# Patient Record
Sex: MALE | Race: WHITE | NOT HISPANIC OR LATINO | Employment: PART TIME | URBAN - METROPOLITAN AREA
[De-identification: names, ages, dates, MRNs, and addresses within clinical notes are randomized per-mention and may not be internally consistent; named-entity substitution may affect disease eponyms.]

---

## 2017-01-11 ENCOUNTER — ALLSCRIPTS OFFICE VISIT (OUTPATIENT)
Dept: OTHER | Facility: OTHER | Age: 67
End: 2017-01-11

## 2017-01-11 DIAGNOSIS — E11.9 TYPE 2 DIABETES MELLITUS WITHOUT COMPLICATIONS (HCC): ICD-10-CM

## 2017-01-11 DIAGNOSIS — R07.89 OTHER CHEST PAIN: ICD-10-CM

## 2017-01-16 ENCOUNTER — TRANSCRIBE ORDERS (OUTPATIENT)
Dept: ADMINISTRATIVE | Facility: HOSPITAL | Age: 67
End: 2017-01-16

## 2017-01-16 ENCOUNTER — HOSPITAL ENCOUNTER (OUTPATIENT)
Dept: NON INVASIVE DIAGNOSTICS | Facility: HOSPITAL | Age: 67
Discharge: HOME | End: 2017-01-16
Attending: FAMILY MEDICINE
Payer: COMMERCIAL

## 2017-01-16 ENCOUNTER — GENERIC CONVERSION - ENCOUNTER (OUTPATIENT)
Dept: OTHER | Facility: OTHER | Age: 67
End: 2017-01-16

## 2017-01-16 ENCOUNTER — HOSPITAL ENCOUNTER (OUTPATIENT)
Dept: RADIOLOGY | Facility: HOSPITAL | Age: 67
Discharge: HOME | End: 2017-01-16
Attending: FAMILY MEDICINE
Payer: COMMERCIAL

## 2017-01-16 DIAGNOSIS — E11.49 OTHER DIABETIC NEUROLOGICAL COMPLICATION ASSOCIATED WITH TYPE 2 DIABETES MELLITUS (HCC): Primary | ICD-10-CM

## 2017-01-16 DIAGNOSIS — E11.49 OTHER DIABETIC NEUROLOGICAL COMPLICATION ASSOCIATED WITH TYPE 2 DIABETES MELLITUS (HCC): ICD-10-CM

## 2017-01-16 DIAGNOSIS — R07.89 OTHER CHEST PAIN: ICD-10-CM

## 2017-01-16 PROCEDURE — 93005 ELECTROCARDIOGRAM TRACING: CPT

## 2017-01-16 PROCEDURE — 71020 HB CHEST X-RAY 2VW FRONTAL&LATL: CPT

## 2017-01-17 ENCOUNTER — GENERIC CONVERSION - ENCOUNTER (OUTPATIENT)
Dept: OTHER | Facility: OTHER | Age: 67
End: 2017-01-17

## 2017-01-17 LAB
ATRIAL RATE: 81 BPM
P AXIS: 67 DEGREES
PR INTERVAL: 144 MS
QRS AXIS: 22 DEGREES
QRSD INTERVAL: 90 MS
QT INTERVAL: 400 MS
QTC INTERVAL: 464 MS
T WAVE AXIS: 42 DEGREES
VENTRICULAR RATE: 81 BPM

## 2017-01-18 ENCOUNTER — HOSPITAL ENCOUNTER (OUTPATIENT)
Dept: NON INVASIVE DIAGNOSTICS | Facility: HOSPITAL | Age: 67
Discharge: HOME | End: 2017-01-18
Attending: FAMILY MEDICINE
Payer: COMMERCIAL

## 2017-01-18 DIAGNOSIS — R07.89 OTHER CHEST PAIN: ICD-10-CM

## 2017-01-18 PROCEDURE — 93350 STRESS TTE ONLY: CPT

## 2017-01-19 ENCOUNTER — GENERIC CONVERSION - ENCOUNTER (OUTPATIENT)
Dept: OTHER | Facility: OTHER | Age: 67
End: 2017-01-19

## 2017-02-06 ENCOUNTER — TRANSCRIBE ORDERS (OUTPATIENT)
Dept: LAB | Facility: MEDICAL CENTER | Age: 67
End: 2017-02-06

## 2017-02-06 ENCOUNTER — APPOINTMENT (OUTPATIENT)
Dept: LAB | Facility: MEDICAL CENTER | Age: 67
End: 2017-02-06
Payer: COMMERCIAL

## 2017-02-06 DIAGNOSIS — E78.9 UNSPECIFIED DISORDER OF LIPOID METABOLISM: ICD-10-CM

## 2017-02-06 DIAGNOSIS — E11.9 DIABETES MELLITUS WITHOUT COMPLICATION (HCC): Primary | ICD-10-CM

## 2017-02-06 DIAGNOSIS — E11.9 DIABETES MELLITUS WITHOUT COMPLICATION (HCC): ICD-10-CM

## 2017-02-06 LAB
ALBUMIN SERPL BCP-MCNC: 3.8 G/DL (ref 3.5–5)
ALP SERPL-CCNC: 53 U/L (ref 46–116)
ALT SERPL W P-5'-P-CCNC: 17 U/L (ref 12–78)
ANION GAP SERPL CALCULATED.3IONS-SCNC: 8 MMOL/L (ref 4–13)
AST SERPL W P-5'-P-CCNC: 6 U/L (ref 5–45)
BILIRUB SERPL-MCNC: 0.46 MG/DL (ref 0.2–1)
BUN SERPL-MCNC: 11 MG/DL (ref 5–25)
CALCIUM SERPL-MCNC: 8.7 MG/DL (ref 8.3–10.1)
CHLORIDE SERPL-SCNC: 104 MMOL/L (ref 100–108)
CHOLEST SERPL-MCNC: 173 MG/DL (ref 50–200)
CO2 SERPL-SCNC: 29 MMOL/L (ref 21–32)
CREAT SERPL-MCNC: 0.64 MG/DL (ref 0.6–1.3)
CREAT UR-MCNC: 101 MG/DL
EST. AVERAGE GLUCOSE BLD GHB EST-MCNC: 146 MG/DL
GFR SERPL CREATININE-BSD FRML MDRD: >60 ML/MIN/1.73SQ M
GLUCOSE SERPL-MCNC: 130 MG/DL (ref 65–140)
HBA1C MFR BLD: 6.7 % (ref 4.2–6.3)
HDLC SERPL-MCNC: 48 MG/DL (ref 40–60)
LDLC SERPL CALC-MCNC: 108 MG/DL (ref 0–100)
MICROALBUMIN UR-MCNC: 7.8 MG/L (ref 0–20)
MICROALBUMIN/CREAT 24H UR: 8 MG/G CREATININE (ref 0–30)
POTASSIUM SERPL-SCNC: 4 MMOL/L (ref 3.5–5.3)
PROT SERPL-MCNC: 7.3 G/DL (ref 6.4–8.2)
SODIUM SERPL-SCNC: 141 MMOL/L (ref 136–145)
TRIGL SERPL-MCNC: 87 MG/DL

## 2017-02-06 PROCEDURE — 80061 LIPID PANEL: CPT

## 2017-02-06 PROCEDURE — 36415 COLL VENOUS BLD VENIPUNCTURE: CPT

## 2017-02-06 PROCEDURE — 80053 COMPREHEN METABOLIC PANEL: CPT

## 2017-02-06 PROCEDURE — 82043 UR ALBUMIN QUANTITATIVE: CPT

## 2017-02-06 PROCEDURE — 83036 HEMOGLOBIN GLYCOSYLATED A1C: CPT

## 2017-02-06 PROCEDURE — 82570 ASSAY OF URINE CREATININE: CPT

## 2017-02-07 ENCOUNTER — GENERIC CONVERSION - ENCOUNTER (OUTPATIENT)
Dept: OTHER | Facility: OTHER | Age: 67
End: 2017-02-07

## 2017-02-13 ENCOUNTER — ALLSCRIPTS OFFICE VISIT (OUTPATIENT)
Dept: OTHER | Facility: OTHER | Age: 67
End: 2017-02-13

## 2017-02-13 DIAGNOSIS — S76.312A STRAIN OF MUSCLE, FASCIA AND TENDON OF THE POSTERIOR MUSCLE GROUP AT THIGH LEVEL, LEFT THIGH, INITIAL ENCOUNTER: ICD-10-CM

## 2017-02-15 ENCOUNTER — APPOINTMENT (OUTPATIENT)
Dept: PHYSICAL THERAPY | Facility: CLINIC | Age: 67
End: 2017-02-15
Payer: COMMERCIAL

## 2017-02-15 ENCOUNTER — GENERIC CONVERSION - ENCOUNTER (OUTPATIENT)
Dept: OTHER | Facility: OTHER | Age: 67
End: 2017-02-15

## 2017-02-15 DIAGNOSIS — S76.312A STRAIN OF MUSCLE, FASCIA AND TENDON OF THE POSTERIOR MUSCLE GROUP AT THIGH LEVEL, LEFT THIGH, INITIAL ENCOUNTER: ICD-10-CM

## 2017-02-15 PROCEDURE — 97010 HOT OR COLD PACKS THERAPY: CPT

## 2017-02-15 PROCEDURE — 97110 THERAPEUTIC EXERCISES: CPT

## 2017-02-15 PROCEDURE — 97140 MANUAL THERAPY 1/> REGIONS: CPT

## 2017-02-15 PROCEDURE — 97162 PT EVAL MOD COMPLEX 30 MIN: CPT

## 2017-02-15 PROCEDURE — 97535 SELF CARE MNGMENT TRAINING: CPT

## 2017-02-21 ENCOUNTER — APPOINTMENT (OUTPATIENT)
Dept: PHYSICAL THERAPY | Facility: CLINIC | Age: 67
End: 2017-02-21
Payer: COMMERCIAL

## 2017-02-23 ENCOUNTER — APPOINTMENT (OUTPATIENT)
Dept: PHYSICAL THERAPY | Facility: CLINIC | Age: 67
End: 2017-02-23
Payer: COMMERCIAL

## 2017-02-23 PROCEDURE — 97140 MANUAL THERAPY 1/> REGIONS: CPT

## 2017-02-23 PROCEDURE — 97110 THERAPEUTIC EXERCISES: CPT

## 2017-02-23 PROCEDURE — 97010 HOT OR COLD PACKS THERAPY: CPT

## 2017-02-23 PROCEDURE — 97112 NEUROMUSCULAR REEDUCATION: CPT

## 2017-02-28 ENCOUNTER — APPOINTMENT (OUTPATIENT)
Dept: PHYSICAL THERAPY | Facility: CLINIC | Age: 67
End: 2017-02-28
Payer: COMMERCIAL

## 2017-02-28 PROCEDURE — 97010 HOT OR COLD PACKS THERAPY: CPT

## 2017-02-28 PROCEDURE — 97110 THERAPEUTIC EXERCISES: CPT

## 2017-02-28 PROCEDURE — 97140 MANUAL THERAPY 1/> REGIONS: CPT

## 2017-02-28 PROCEDURE — 97112 NEUROMUSCULAR REEDUCATION: CPT

## 2017-03-03 ENCOUNTER — APPOINTMENT (OUTPATIENT)
Dept: PHYSICAL THERAPY | Facility: CLINIC | Age: 67
End: 2017-03-03
Payer: COMMERCIAL

## 2017-03-03 PROCEDURE — 97112 NEUROMUSCULAR REEDUCATION: CPT

## 2017-03-03 PROCEDURE — 97140 MANUAL THERAPY 1/> REGIONS: CPT

## 2017-03-03 PROCEDURE — 97010 HOT OR COLD PACKS THERAPY: CPT

## 2017-03-03 PROCEDURE — 97110 THERAPEUTIC EXERCISES: CPT

## 2017-03-08 ENCOUNTER — APPOINTMENT (OUTPATIENT)
Dept: PHYSICAL THERAPY | Facility: CLINIC | Age: 67
End: 2017-03-08
Payer: COMMERCIAL

## 2017-03-08 PROCEDURE — 97140 MANUAL THERAPY 1/> REGIONS: CPT

## 2017-03-08 PROCEDURE — 97110 THERAPEUTIC EXERCISES: CPT

## 2017-03-10 ENCOUNTER — APPOINTMENT (OUTPATIENT)
Dept: PHYSICAL THERAPY | Facility: CLINIC | Age: 67
End: 2017-03-10
Payer: COMMERCIAL

## 2017-03-16 ENCOUNTER — APPOINTMENT (OUTPATIENT)
Dept: PHYSICAL THERAPY | Facility: CLINIC | Age: 67
End: 2017-03-16
Payer: COMMERCIAL

## 2017-03-21 ENCOUNTER — GENERIC CONVERSION - ENCOUNTER (OUTPATIENT)
Dept: OTHER | Facility: OTHER | Age: 67
End: 2017-03-21

## 2017-03-21 ENCOUNTER — APPOINTMENT (OUTPATIENT)
Dept: PHYSICAL THERAPY | Facility: CLINIC | Age: 67
End: 2017-03-21
Payer: COMMERCIAL

## 2017-03-21 PROCEDURE — 97112 NEUROMUSCULAR REEDUCATION: CPT

## 2017-03-21 PROCEDURE — 97140 MANUAL THERAPY 1/> REGIONS: CPT

## 2017-03-21 PROCEDURE — 97110 THERAPEUTIC EXERCISES: CPT

## 2017-03-21 PROCEDURE — 97010 HOT OR COLD PACKS THERAPY: CPT

## 2017-03-24 ENCOUNTER — APPOINTMENT (OUTPATIENT)
Dept: PHYSICAL THERAPY | Facility: CLINIC | Age: 67
End: 2017-03-24
Payer: COMMERCIAL

## 2017-03-24 PROCEDURE — 97140 MANUAL THERAPY 1/> REGIONS: CPT

## 2017-03-24 PROCEDURE — 97110 THERAPEUTIC EXERCISES: CPT

## 2017-03-28 ENCOUNTER — APPOINTMENT (OUTPATIENT)
Dept: PHYSICAL THERAPY | Facility: CLINIC | Age: 67
End: 2017-03-28
Payer: COMMERCIAL

## 2017-03-28 ENCOUNTER — GENERIC CONVERSION - ENCOUNTER (OUTPATIENT)
Dept: OTHER | Facility: OTHER | Age: 67
End: 2017-03-28

## 2017-03-28 DIAGNOSIS — R10.9 ABDOMINAL PAIN: ICD-10-CM

## 2017-03-28 PROCEDURE — 97010 HOT OR COLD PACKS THERAPY: CPT

## 2017-03-28 PROCEDURE — 97110 THERAPEUTIC EXERCISES: CPT

## 2017-03-28 PROCEDURE — 97112 NEUROMUSCULAR REEDUCATION: CPT

## 2017-03-28 PROCEDURE — 97140 MANUAL THERAPY 1/> REGIONS: CPT

## 2017-03-31 ENCOUNTER — APPOINTMENT (OUTPATIENT)
Dept: PHYSICAL THERAPY | Facility: CLINIC | Age: 67
End: 2017-03-31
Payer: COMMERCIAL

## 2017-03-31 PROCEDURE — 97110 THERAPEUTIC EXERCISES: CPT

## 2017-03-31 PROCEDURE — 97112 NEUROMUSCULAR REEDUCATION: CPT

## 2017-03-31 PROCEDURE — 97010 HOT OR COLD PACKS THERAPY: CPT

## 2017-03-31 PROCEDURE — 97140 MANUAL THERAPY 1/> REGIONS: CPT

## 2017-04-04 ENCOUNTER — APPOINTMENT (OUTPATIENT)
Dept: PHYSICAL THERAPY | Facility: CLINIC | Age: 67
End: 2017-04-04
Payer: COMMERCIAL

## 2017-04-04 PROCEDURE — 97110 THERAPEUTIC EXERCISES: CPT

## 2017-04-04 PROCEDURE — 97112 NEUROMUSCULAR REEDUCATION: CPT

## 2017-04-04 PROCEDURE — 97010 HOT OR COLD PACKS THERAPY: CPT

## 2017-04-04 PROCEDURE — 97140 MANUAL THERAPY 1/> REGIONS: CPT

## 2017-05-23 ENCOUNTER — GENERIC CONVERSION - ENCOUNTER (OUTPATIENT)
Dept: OTHER | Facility: OTHER | Age: 67
End: 2017-05-23

## 2017-05-30 ENCOUNTER — GENERIC CONVERSION - ENCOUNTER (OUTPATIENT)
Dept: OTHER | Facility: OTHER | Age: 67
End: 2017-05-30

## 2017-08-25 ENCOUNTER — TRANSCRIBE ORDERS (OUTPATIENT)
Dept: LAB | Facility: MEDICAL CENTER | Age: 67
End: 2017-08-25

## 2017-08-25 ENCOUNTER — APPOINTMENT (OUTPATIENT)
Dept: RADIOLOGY | Facility: MEDICAL CENTER | Age: 67
End: 2017-08-25
Payer: COMMERCIAL

## 2017-08-25 ENCOUNTER — ALLSCRIPTS OFFICE VISIT (OUTPATIENT)
Dept: OTHER | Facility: OTHER | Age: 67
End: 2017-08-25

## 2017-08-25 DIAGNOSIS — M25.551 PAIN IN RIGHT HIP: ICD-10-CM

## 2017-08-25 DIAGNOSIS — M25.552 PAIN IN LEFT HIP: ICD-10-CM

## 2017-08-25 PROCEDURE — 73522 X-RAY EXAM HIPS BI 3-4 VIEWS: CPT

## 2017-08-28 ENCOUNTER — GENERIC CONVERSION - ENCOUNTER (OUTPATIENT)
Dept: OTHER | Facility: OTHER | Age: 67
End: 2017-08-28

## 2017-08-28 ENCOUNTER — APPOINTMENT (OUTPATIENT)
Dept: LAB | Facility: MEDICAL CENTER | Age: 67
End: 2017-08-28
Payer: COMMERCIAL

## 2017-08-28 ENCOUNTER — TRANSCRIBE ORDERS (OUTPATIENT)
Dept: LAB | Facility: MEDICAL CENTER | Age: 67
End: 2017-08-28

## 2017-08-28 DIAGNOSIS — E11.9 DIABETES MELLITUS WITHOUT COMPLICATION (HCC): Primary | ICD-10-CM

## 2017-08-28 DIAGNOSIS — E11.9 DIABETES MELLITUS WITHOUT COMPLICATION (HCC): ICD-10-CM

## 2017-08-28 LAB
ANION GAP SERPL CALCULATED.3IONS-SCNC: 6 MMOL/L (ref 4–13)
BUN SERPL-MCNC: 12 MG/DL (ref 5–25)
CALCIUM SERPL-MCNC: 9.3 MG/DL (ref 8.3–10.1)
CHLORIDE SERPL-SCNC: 104 MMOL/L (ref 100–108)
CO2 SERPL-SCNC: 27 MMOL/L (ref 21–32)
CREAT SERPL-MCNC: 0.73 MG/DL (ref 0.6–1.3)
CREAT UR-MCNC: 132 MG/DL
EST. AVERAGE GLUCOSE BLD GHB EST-MCNC: 154 MG/DL
GFR SERPL CREATININE-BSD FRML MDRD: 97 ML/MIN/1.73SQ M
GLUCOSE P FAST SERPL-MCNC: 189 MG/DL (ref 65–99)
HBA1C MFR BLD: 7 % (ref 4.2–6.3)
MICROALBUMIN UR-MCNC: 25 MG/L (ref 0–20)
MICROALBUMIN/CREAT 24H UR: 19 MG/G CREATININE (ref 0–30)
POTASSIUM SERPL-SCNC: 4.5 MMOL/L (ref 3.5–5.3)
SODIUM SERPL-SCNC: 137 MMOL/L (ref 136–145)

## 2017-08-28 PROCEDURE — 80048 BASIC METABOLIC PNL TOTAL CA: CPT

## 2017-08-28 PROCEDURE — 36415 COLL VENOUS BLD VENIPUNCTURE: CPT

## 2017-08-28 PROCEDURE — 82570 ASSAY OF URINE CREATININE: CPT | Performed by: FAMILY MEDICINE

## 2017-08-28 PROCEDURE — 82043 UR ALBUMIN QUANTITATIVE: CPT | Performed by: FAMILY MEDICINE

## 2017-08-28 PROCEDURE — 83036 HEMOGLOBIN GLYCOSYLATED A1C: CPT

## 2017-08-29 ENCOUNTER — GENERIC CONVERSION - ENCOUNTER (OUTPATIENT)
Dept: OTHER | Facility: OTHER | Age: 67
End: 2017-08-29

## 2017-09-26 ENCOUNTER — APPOINTMENT (OUTPATIENT)
Dept: RADIOLOGY | Facility: MEDICAL CENTER | Age: 67
End: 2017-09-26
Payer: COMMERCIAL

## 2017-09-26 ENCOUNTER — HOSPITAL ENCOUNTER (OUTPATIENT)
Dept: RADIOLOGY | Facility: HOSPITAL | Age: 67
Discharge: HOME | End: 2017-09-26
Payer: COMMERCIAL

## 2017-09-26 ENCOUNTER — ALLSCRIPTS OFFICE VISIT (OUTPATIENT)
Dept: OTHER | Facility: OTHER | Age: 67
End: 2017-09-26

## 2017-09-26 ENCOUNTER — HOSPITAL ENCOUNTER (OUTPATIENT)
Dept: MRI IMAGING | Facility: HOSPITAL | Age: 67
Discharge: HOME | End: 2017-09-26
Attending: ORTHOPAEDIC SURGERY
Payer: COMMERCIAL

## 2017-09-26 ENCOUNTER — TRANSCRIBE ORDERS (OUTPATIENT)
Dept: ADMINISTRATIVE | Facility: HOSPITAL | Age: 67
End: 2017-09-26

## 2017-09-26 DIAGNOSIS — M51.26 OTHER INTERVERTEBRAL DISC DISPLACEMENT, LUMBAR REGION: ICD-10-CM

## 2017-09-26 DIAGNOSIS — M54.17 RADICULOPATHY OF LUMBOSACRAL REGION: ICD-10-CM

## 2017-09-26 DIAGNOSIS — M51.26 RUPTURED LUMBAR DISC: ICD-10-CM

## 2017-09-26 DIAGNOSIS — M51.26 RUPTURED LUMBAR DISC: Primary | ICD-10-CM

## 2017-09-26 DIAGNOSIS — M54.16 RADICULOPATHY OF LUMBAR REGION: ICD-10-CM

## 2017-09-26 PROCEDURE — 72110 X-RAY EXAM L-2 SPINE 4/>VWS: CPT

## 2017-09-26 PROCEDURE — 72148 MRI LUMBAR SPINE W/O DYE: CPT

## 2017-10-06 ENCOUNTER — GENERIC CONVERSION - ENCOUNTER (OUTPATIENT)
Dept: OTHER | Facility: OTHER | Age: 67
End: 2017-10-06

## 2017-10-06 ENCOUNTER — ALLSCRIPTS OFFICE VISIT (OUTPATIENT)
Dept: OTHER | Facility: OTHER | Age: 67
End: 2017-10-06

## 2017-10-11 ENCOUNTER — GENERIC CONVERSION - ENCOUNTER (OUTPATIENT)
Dept: OTHER | Facility: OTHER | Age: 67
End: 2017-10-11

## 2017-10-16 RX ORDER — ASPIRIN AND DIPYRIDAMOLE 25; 200 MG/1; MG/1
1 CAPSULE, EXTENDED RELEASE ORAL EVERY 12 HOURS SCHEDULED
COMMUNITY
End: 2018-05-17 | Stop reason: SDUPTHER

## 2017-10-19 ENCOUNTER — HOSPITAL ENCOUNTER (OUTPATIENT)
Facility: HOSPITAL | Age: 67
Setting detail: OUTPATIENT SURGERY
Discharge: HOME | End: 2017-10-19
Attending: ANESTHESIOLOGY | Admitting: ANESTHESIOLOGY
Payer: COMMERCIAL

## 2017-10-19 ENCOUNTER — APPOINTMENT (OUTPATIENT)
Dept: RADIOLOGY | Facility: HOSPITAL | Age: 67
End: 2017-10-19
Payer: COMMERCIAL

## 2017-10-19 VITALS
TEMPERATURE: 98 F | SYSTOLIC BLOOD PRESSURE: 125 MMHG | HEART RATE: 83 BPM | OXYGEN SATURATION: 99 % | WEIGHT: 155 LBS | BODY MASS INDEX: 20.99 KG/M2 | RESPIRATION RATE: 18 BRPM | HEIGHT: 72 IN | DIASTOLIC BLOOD PRESSURE: 76 MMHG

## 2017-10-19 PROBLEM — M54.16 LUMBAR RADICULOPATHY: Status: ACTIVE | Noted: 2017-10-19

## 2017-10-19 PROCEDURE — 72020 X-RAY EXAM OF SPINE 1 VIEW: CPT

## 2017-10-19 RX ORDER — LIDOCAINE HYDROCHLORIDE 20 MG/ML
INJECTION, SOLUTION EPIDURAL; INFILTRATION; INTRACAUDAL; PERINEURAL AS NEEDED
Status: DISCONTINUED | OUTPATIENT
Start: 2017-10-19 | End: 2017-10-19 | Stop reason: HOSPADM

## 2017-10-19 RX ORDER — DEXAMETHASONE SODIUM PHOSPHATE 10 MG/ML
INJECTION, SOLUTION INTRAMUSCULAR; INTRAVENOUS AS NEEDED
Status: DISCONTINUED | OUTPATIENT
Start: 2017-10-19 | End: 2017-10-19 | Stop reason: HOSPADM

## 2017-10-19 RX ORDER — LIDOCAINE HYDROCHLORIDE 10 MG/ML
INJECTION, SOLUTION EPIDURAL; INFILTRATION; INTRACAUDAL; PERINEURAL AS NEEDED
Status: DISCONTINUED | OUTPATIENT
Start: 2017-10-19 | End: 2017-10-19 | Stop reason: HOSPADM

## 2017-10-19 RX ADMIN — IOHEXOL 48 ML: 300 INJECTION, SOLUTION INTRAVENOUS at 13:28

## 2017-10-19 NOTE — DISCHARGE INSTRUCTIONS
ACTIVITY  · Do not drive or operate machinery today  · No strenuous activity today - bending, lifting, etc   · You may resume normal activites starting tomorrow - start slowly and as tolerated  · You may shower today, but no tub baths or hot tubs  · You may have numbness for several hours from the local anesthetic  Please use caution and common sense, especially with weight-bearing activities  CARE OF THE INJECTION SITE  · If you have soreness or pain, apply ice to the area today (20 minutes on/20 minutes off)  · Starting tomorrow, you may use warm, moist heat or ice if needed  · You may have an increase or change in your discomfort for 36-48 hours after your treatment  · Apply ice and continue with any pain medication you have been prescribed  · Notify [facility] if you have any of the following: redness, drainage, swelling, headache, stiff neck or fever above 100°F     SPECIAL INSTRUCTIONS  · Our office will contact you in approximately 7 days for a progress report  MEDICATIONS  · Continue to take all routine medications  · Our office may have instructed you to hold some medications  If you have a problem specifically related to your procedure, please call our office at [phone]  Problems not related to your procedure should be directed to your primary care physician

## 2017-10-19 NOTE — OP NOTE
OPERATIVE REPORT  PATIENT NAME: Andre Lee    :  1950  MRN: [id number]  Pt Location: [facility] OR ROOM 02    SURGERY DATE: 10/19/2017    Surgeon(s) and Role:     * DO Dar Huerta Primary    Preop Diagnosis:  Radiculopathy of lumbar region [M54 16]    Post-Op Diagnosis Codes:     * Radiculopathy of lumbar region [M54 16]    Procedure(s) (LRB):  L5 TRANSFORAMINAL EPIDURAL STEROID INJECTION (Bilateral)    Specimen(s):  * No specimens in log *    Estimated Blood Loss:   Minimal    Drains:       Anesthesia Type:   Local    Operative Indications:  Radiculopathy of lumbar region [M54 16]      Operative Findings:  none    Complications:   None    Procedure and Technique:    Bilateral L5 Transforaminal Epidural Steroid Injection    Indication:  Low back and leg pain  Preoperative diagnosis:  Lumbar radiculitis  Postoperative diagnosis:  Lumbar radiculitis    Procedure: Fluoroscopically-guided bilateral L5 transforaminal epidural steroid injection under fluoroscopy      After discussing the risks, benefits, and alternatives to the procedure, the patient expressed understanding and wished to proceed  The patient was brought to the fluoroscopy suite and placed in the prone position  A procedural pause was conducted to verify:  correct patient identity, procedure to be performed and as applicable, correct side and site, correct patient position, and availability of implants, special equipment and special requirements  After identifying the bilateral L5 pedicles fluoroscopically with an oblique view, the skin was sterilely prepped and draped in the usual fashion using Chloraprep skin prep  The skin and subcutaneous tissue were anesthetized with 1% lidocaine  A 3-1/2 inch 22 gauge spinal needle was then advanced under fluoroscopic guidance to the posterior aspect of the bilateral L5 neural foramens    Appropriate foraminal depth was determined with a lateral fluoroscopic view, and AP visualization confirmed needle positioning at approximately the 6 oclock position relative to the pedicles  After negative aspiration, 2 mL of Omnipaque 300 contrast was injected using live fluoroscopy/digital subtraction angiography, confirming appropriate transforaminal spread without evidence of intravascular or intrathecal uptake  Next, a local anesthetic test dose consisting of 1 mL of 2% lidocaine was injected through the needle at each level  After an appropriate period of observation, a directed neurological exam was performed which revealed no new neurologic deficits  Next, a 1 5 ml solution consisting of 7 5 mg of dexamethasone in sterile saline was injected slowly and incrementally into the epidural space at each level  Following the injection the needles were withdrawn slightly and flushed with lidocaine as they were fully extracted  The patient tolerated the procedure well and there were no apparent complications  The patient did not develop any new neurologic deficits  After appropriate observation, the patient was dismissed from the clinic in good condition under their own power  COMMENTS   The patient received a total steroid dose of 15 mg of dexamethasone                      I was present for the entire procedure    Patient Disposition:  PACU     SIGNATURE: Baldomero Riggs DO  DATE: October 19, 2017  TIME: 3:16 PM

## 2017-10-24 ENCOUNTER — ALLSCRIPTS OFFICE VISIT (OUTPATIENT)
Dept: OTHER | Facility: OTHER | Age: 67
End: 2017-10-24

## 2017-10-25 NOTE — PROGRESS NOTES
Assessment  1  Ambulatory dysfunction (719 7) (R26 2)   2  Bilateral leg weakness (509 89) (R29 898)    Plan  Ambulatory dysfunction, Bilateral leg weakness    · *1 - [facility] Co-[facility]  *  Status: Hold For - Scheduling  Requested  for: 59YHT1063  Care Summary provided  : Yes  Exercise counseling    · Benefits of Exercise/Physical Activity; Status:Active; Requested IAR:[id number];     Discussion/Summary    Sridhar Rader would benefit from VNA coming in and assisting him with ADLs  Will place a referral  Sridhar Rader tells me he is scheduled to see a spinal surgeon on 11/1/17 for probably spinal surgery at [facility]  The patient was counseled regarding instructions for management,-- risk factor reductions,-- prognosis,-- patient and family education,-- risks and benefits of treatment options,-- importance of compliance with treatment  Possible side effects of new medications were reviewed with the patient/guardian today  The treatment plan was reviewed with the patient/guardian  The patient/guardian understands and agrees with the treatment plan      Chief Complaint  Sridhar Rader is here today for a face to face visit for a VNA referral  He needs help with ADLs  He has extreme weakness and muscle atrophy due to lumbar DDD and foraminal stenosis  If Sridhar Rader needs to walk a distance greater than 10-15 feet, he says his legs give out on him and he falls  He is unable to get in and out of his shower without assistance  He has been using a walker to help stabilize him but he admits to falling almost daily  He lives alone with his 2 dogs  Patient is here today for follow up of chronic conditions described in HPI  History of Present Illness  See chief complaint  Review of Systems    Constitutional: no fever-- and-- no chills  Cardiovascular: no chest pain-- and-- no palpitations  Respiratory: no shortness of breath  Gastrointestinal: no abdominal pain,-- no constipation-- and-- no diarrhea     Musculoskeletal: lumbar back pain  Endocrine: muscle weakness, but-- as noted in HPI  ROS reviewed  Active Problems  1  Diabetes mellitus (250 00) (E11 9)   2  Diabetic neuropathy (250 60,357 2) (E11 40)   3  Dyslipidemia (272 4) (E78 5)   4  Exercise counseling (V65 41) (Z71 82)   5  Herniated lumbar intervertebral disc (722 10) (M51 26)   6  Lumbar back pain with radiculopathy affecting right lower extremity (724 4) (M54 17)   7  Lumbar foraminal stenosis (724 02) (M99 83)   8  Lumbar radiculopathy (724 4) (M54 16)   9  Lumbar spondylosis (721 3) (M47 816)    Past Medical History  1  History of Abnormal CT of the abdomen (793 6) (R93 5)   2  History of Atypical chest pain (786 59) (R07 89)   3  History of Bilateral hip pain (719 45) (M25 551,M25 552)   4  History of Depression screening (V79 0) (Z13 89)   5  History of Hamstring muscle strain, left, initial encounter (843 8) (Q00 274R)   6  History of abdominal pain (V13 89) (Z87 898)   7  History of iron deficiency anemia (V12 3) (Z86 2)   8  History of pneumococcal vaccination (V49 89) (Z92 29)   9  History of transient cerebral ischemia (V12 54) (Z86 73)   10  History of Medicare annual wellness visit, subsequent (V70 0) (Z00 00)   11  History of Need for hepatitis C screening test (V73 89) (Z11 59)   12  History of Renal colic on right side (542 3) (N23)   13  History of Screening for diabetic peripheral neuropathy (V80 09) (Z13 89)   14  History of Screening for genitourinary condition (V81 6) (Z13 89)   15  History of Screening for neurological condition (V80 09) (Z13 89)   16  History of Special screening examination for neoplasm of prostate (V76 44) (Z12 5)   17  History of Trochanteric bursitis (726 5) (M70 60)   18  History of Visit for preventive health examination (V70 0) (Z00 00)    The active problems and past medical history were reviewed and updated today  Surgical History  1  History Of Prior Surgery    The surgical history was reviewed and updated today  Family History  Family History    1  No pertinent family history    The family history was reviewed and updated today  Social History   · Former smoker (V15 82) (Y43 135)   · No advance directives (V49 89) (Z78 9)   · History of No advance directives (V49 89) (Z78 9)   · No living will   · Social alcohol use (Z78 9)  The social history was reviewed and updated today  The social history was reviewed and is unchanged  Current Meds   1  Accu-Chek Karen Plus In Vitro Strip; test bid; Therapy: 26PFK1209 to (Last Rx:19May2015) Ordered   2  Accu-Chek Softclix Lancets Miscellaneous; test twice daily; Therapy: 10HWR6246 to (Last Rx:19May2015) Ordered   3  Aspirin-Dipyridamole ER  MG Oral Capsule Extended Release 12 Hour; Take 1   capsule twice daily; Therapy: 35Mtp0278 to (Fantasma Goetz)  Requested for: 78Xqc9051; Last   Rx:38Ewr8610 Ordered   4  Gabapentin 100 MG Oral Capsule; TAKE 1 CAPSULE BEDTIME MAY INCREASE TO 3   CAPSULES AT BEDTIME AS TOLERATED; Therapy: 64CQE9419 to (Griffin Burrows)  Requested for: 06RRM6735; Last   Rx:06Oct2017 Ordered   5  Glyxambi 25-5 MG Oral Tablet; one po qd; Therapy: 56MCK5271 to (Evaluate:10Tuq8989)  Requested for: 31MMX8106; Last   Rx:11Jan2017 Ordered   6  MetFORMIN HCl - 1000 MG Oral Tablet; Take 1 tablet twice daily; Therapy: 73ULQ5535 to (Evaluate:06Jan2018)  Requested for: 20GHD4130; Last   Rx:55Yhe5571 Ordered    The medication list was reviewed and updated today  Allergies  1  No Known Drug Allergies    Vitals  Vital Signs    Recorded: 73FCM9811 23:99LF   Systolic 440, LUE, Sitting   Diastolic 72, LUE, Sitting   Height 6 ft    Weight 154 lb 8 0 oz   BMI Calculated 20 95   BSA Calculated 1 91     Physical Exam    Constitutional   General appearance: No acute distress, well appearing and well nourished  Eyes   Conjunctiva and lids: No swelling, erythema, or discharge  Pupils and irises: Equal, round and reactive to light      Ears, Nose, Mouth, and Throat   External inspection of ears and nose: Normal     Otoscopic examination: Tympanic membrance translucent with normal light reflex  Canals patent without erythema  Oropharynx: Normal with no erythema, edema, exudate or lesions  Pulmonary   Respiratory effort: No increased work of breathing or signs of respiratory distress  Auscultation of lungs: Clear to auscultation, equal breath sounds bilaterally, no wheezes, no rales, no rhonci  Cardiovascular   Auscultation of heart: Normal rate and rhythm, normal S1 and S2, without murmurs  Lymphatic   Palpation of lymph nodes in neck: No lymphadenopathy  Musculoskeletal   Gait and station: Abnormal  -- unsteady gait, walks with walker  Skin   Skin and subcutaneous tissue: Normal without rashes or lesions  Psychiatric   Orientation to person, place and time: Normal     Mood and affect: Normal          Health Management  Health Maintenance   Medicare Annual Wellness Visit; every 1 year; Next Due: 07ORA1871; Overdue    Future Appointments    Date/Time Provider Specialty Site   11/01/2017 10:40 AM LEVI Oconnor   Orthopedic Surgery [facility]     Signatures   Electronically signed by : Francie Gosselin, [address]; Oct 24 2017  2:48PM EST                       (Author)    Electronically signed by : Monique Wilson DO; Oct 24 2017  4:09PM EST                       (Author)

## 2017-10-28 NOTE — CONSULTS
Assessment  Assessed    1  Lumbar radiculopathy (724 4) (M54 16)   2  Lumbar foraminal stenosis (724 02) (M99 83)   3  Lumbar spondylosis (721 3) (M47 816)    Plan   Lumbar back pain with radiculopathy affecting right lower extremity    · Procedure Flowsheet; Status:Complete - Retrospective Authorization;   Done: [id number]   Performed: In Office; [phone]; Last Updated By:Kathe Castro; 10/6/2017 8:13:00 AM;Ordered;back pain with radiculopathy affecting right lower extremity; Ordered By:Trev Fajardo;  Lumbar radiculopathy    · Gabapentin 100 MG Oral Capsule; TAKE 1 CAPSULE BEDTIME MAY INCREASE TO3 CAPSULES AT BEDTIME AS TOLERATED   Rx By: Marilyn Pearl; Dispense: 30 Days ; #:90 Capsule; Refill: 1;Lumbar radiculopathy; CARO = N; Verified Transmission to [facility]Y #[id number] Last Updated By: System, [employer]; 10/6/2017 8:53:50 AM   · *1 - [facility]  Consult: Evaluate and treatPlease evaluate if pt is candidate for Taco based therapy for lumbar radiculopathy Status: Active  Requested for: 49WRH0572   Ordered;Lumbar radiculopathy; Ordered By: Marilyn Pearl Performed:  Due: 85SWO2608  Care Summary provided  : Yes   · Nerve Block Transforaminal Epidural Steroid Injection Lumbar; Status:Active; Requestedfor:12Jcf0122;    Perform:[facility]; Due:23Zkx6994;Ordered;radiculopathy; Ordered By:Jovany Fajardo;  28-year-old male presented for initial consultation regarding an eight-month history of lumbosacral back pain with radiculopathy in the L5 distribution of bilateral lower extremities secondary to foraminal stenosis at L5-S1 bilaterally  The patient has not responded to physical therapy or Aleve  #1 I will schedule the patient for bilateral L5 TFESI's to reduce the inflammatory component of his pain  The procedure was discussed in detail using diagrams and models   Risks associated with the procedure were discussed with the patient including, but not limited to bleeding, infection, bruising, and nerve damage  #2 I will prescribe Taco based physical therapy for the patient's radicular symptoms #3 I will initiate gabapentin 100 mg daily at bedtime and titrate up to 300 mg daily at bedtime  The patient was given a titration schedule #4 the patient Should avoid NSAIDs secondary to Aggrenox #5 I will follow-up with the patient in 4-6 weeks after injection     Lumbar back pain with radiculopathy affecting right lower extremity (724 4) (M54 17)     Lumbar radiculopathy (724 4) (M54 16)   Chief Complaint  Chief Complaints    1  Back Pain  Low back and leg pain      History of Present Illness  35-year-old male presented for initial consultation regarding lumbosacral back pain that radiates into the posterolateral aspect of his bilateral lower extremities to his feet with associated weakness and numbness in his toes  The patient denies any paresthesias  He denies any bladder or bowel incontinence or saddle anesthesia  He has been dealing with this pain for the past 8 months and denies any trauma or inciting event  The patient does have a MRI of his lumbar spine revealing degenerative disc disease and spondylosis with severe left and moderate right foraminal stenosis at L5-S1  He has not gotten any relief from physical therapy and moderate relief from chiropractic manipulation  He is currently taking Aleve when necessary with minimal to mild relief  patient rates his pain a 6 out of 10 and the pain is intermittent  The pain is worse in the evening  The pain is described as numbness and sharp  The pain is increased with lying down and walking  The pain ishave personally reviewed and/or updated the patient's past medical history, past surgical history, family history, social history, allergies, and vital signs today  Other than as stated above, the patient denies any interval changes in medications, medical condition, mental condition, symptoms, or allergies since the last office visit  Referring physician is  [facility] physician is  Daisy Velazquez presents with complaints of intermittent episodes of moderate bilateral lower back pain, described as sharp, radiating to the bilateral buttock, bilateral thigh, bilateral lower leg and bilateral foot  On a scale of 1 to 10, the patient rates the pain as 7  Review of Systems   Constitutional: recent weight loss, but-- no fever-- and-- no recent weight gain  Eyes: no double vision-- and-- no blurry vision  Cardiovascular: chest pain, but-- no palpitations-- and-- no lower extremity edema  Respiratory: no complaints of shortness of breath-- and-- no wheezing  Musculoskeletal: muscle weakness-- and-- joint stiffness, but-- no difficulty walking,-- no joint swelling,-- no limb swelling,-- no pain in extremity-- and-- no decreased range of motion  Neurological: dizziness, but-- no difficulty swallowing,-- no memory loss,-- no loss of consciousness-- and-- no seizures  Gastrointestinal: no nausea,-- no vomiting,-- no constipation-- and-- no diarrhea  Genitourinary: no difficulty initiating urine stream,-- no genital pain-- and-- no frequent urination  Integumentary: no complaints of skin rash  Psychiatric: depression  Endocrine: no excessive thirst,-- no adrenal disease,-- no hypothyroidism-- and-- no hyperthyroidism  Hematologic/Lymphatic: no tendency for easy bruising-- and-- no tendency for easy bleeding  ROS reviewed  Active Problems   Problems    1  Atypical chest pain (786 59) (R07 89)   2  Bilateral hip pain (719 45) (M25 551,M25 552)   3  Diabetes mellitus (250 00) (E11 9)   4  Diabetic neuropathy (250 60,357 2) (E11 40)   5  Dyslipidemia (272 4) (E78 5)   6  Hamstring muscle strain, left, initial encounter (843 8) (S76 312A)   7  Herniated lumbar intervertebral disc (722 10) (M51 26)   8  Iron deficiency anemia (280 9) (D50 9)   9  Need for influenza vaccination (V04 81) (Z23)   10   Renal colic on right side (788 0) (N23)   11  TIA (transient ischemic attack) (435 9) (G45 9)   12  Trochanteric bursitis (726 5) (M70 60)  Lumbar back pain with radiculopathy affecting right lower extremity (724 4) (M54 17)       Past Medical History  Problems    1  History of Abnormal CT of the abdomen (793 6) (R93 5)   2  History of Depression screening (V79 0) (Z13 89)   3  History of Exercise counseling (V65 41) (Z71 82)   4  History of abdominal pain (V13 89) (Z87 898)   5  History of pneumococcal vaccination (V49 89) (Z92 29)   6  History of Medicare annual wellness visit, subsequent (V70 0) (Z00 00)   7  History of Need for hepatitis C screening test (V73 89) (Z11 59)   8  History of Screening for diabetic peripheral neuropathy (V80 09) (Z13 89)   9  History of Screening for genitourinary condition (V81 6) (Z13 89)   10  History of Screening for neurological condition (V80 09) (Z13 89)   11  History of Special screening examination for neoplasm of prostate (V76 44) (Z12 5)   12  History of Visit for preventive health examination (V70 0) (Z00 00)    Surgical History  Problems    1  History Of Prior Surgery    Family History  Family History    1  No pertinent family history    Social History  Problems    · Former smoker (V15 82) (N41 385)   · No advance directives (V49 89) (Z78 9)   · History of No advance directives (V49 89) (Z78 9)   · No living will   · Social alcohol use (Z78 9)    Current Meds   1  Accu-Chek Karen Plus In Vitro Strip; test bid; Therapy: 93ZDW5175 to (Last Rx:04Fxt0854) Ordered   2  Accu-Chek Softclix Lancets Miscellaneous; test twice daily; Therapy: 10RSC7066 to (Last Rx:90Kuv6739) Ordered   3  Aspirin-Dipyridamole ER  MG Oral Capsule Extended Release 12 Hour; Take 1 capsule twice daily; Therapy: 38Oxk8569 to (73 363 135)  Requested for: 97Pkk0737; Last Rx:66Cuh9820 Ordered   4  Glyxambi 25-5 MG Oral Tablet; one po qd;  Therapy: 07MMR1808 to (Evaluate:27Cev5440)  Requested for: 88VHW4740; Last Rx:11Jan2017 Ordered   5  MetFORMIN HCl - 1000 MG Oral Tablet; Take 1 tablet twice daily; Therapy: 58OKA7928 to (Evaluate:06Jan2018)  Requested for: 17DBZ0651; Last Rx:84Lzj2737 Ordered    Allergies  Medication    1  No Known Drug Allergies    Vitals  Vital Signs    Recorded: 55ENH9668 08:08AM   Temperature 25 7 F   Systolic 784   Diastolic 72   Height 6 ft    Weight 156 lb 8 oz   BMI Calculated 21 23   BSA Calculated 1 92   Pain Scale 7       Physical Exam   Constitutional  General appearance: Well developed, well nourished, alert, in no distress, non-toxic and no overt pain behavior  Eyes  Sclera: anicteric  HEENT  Hearing grossly intact  Neck  Neck: Supple, symmetric, trachea midline, no masses  Pulmonary  Respiratory effort: Even and unlabored  Abdomen  Abdomen: Soft, non-tender, non-distended  Skin  Skin and subcutaneous tissue: Normal without rashes or lesions, well hydrated  Psychiatric  Mood and affect: Mood and affect appropriate  Neurologic  the muscle tone was normal  Musculoskeletal  Gait and station: Abnormal  -- Antalgic gait  Lumbar/Sacral Spine examination demonstrates Lumbosacral Spine:  Appearance: Normal   Tenderness: right paraspinal-- and-- left paraspinal  Palpatory Findings include bilateral muscle spasms  Lumbosacral Spine Sensory: intact to light touch and pinprick in the lower extremities  ROM Lumbosacral Spine: Full except as noted: Flexion was restricted  Extension was restricted-- and-- was painful  ROM Hips: Full  Foot and ankle strength was normal on the left side  Right Foot Plantar Flexion: 5/5  Right Foot Dorsiflexion: 4/5  Right Ankle Inversion: 5/5  Right Ankle Eversion: 5/5  Right EHL: 4/5  Right FHL: 5/5  Knee strength was normal bilaterally  Hip strength was normal bilaterally  Evaluation of Muscle Stretch Reflexes on the right side demonstrates muscle stretch reflexes equal and symmetric right lower limbs-- and-- negative right ankle clonus  Evaluation of Muscle Stretch Reflexes on the left side demonstrates negative left ankle clonus, but-- muscle stretch reflexes equal and symmetric right lower limbs  Special Tests: positive Straight Leg Raise on right-- and-- positive Straight Leg Raise on left, but-- negative Iglesia's Maneuver on right-- and-- negative Iglesia's Maneuver on lef  Results/Data  Procedure Flowsheet [id number] 08:12AM Mecca Diaz     Test Name Result Flag Reference   Oswestry Score 44         Results    I personally reviewed the films/images in the office today  * MRI LUMBAR SPINE WO CONTRAST [id number] 02:45PM Dory Lundy     Test Name Result Flag Reference   MRI LUMBAR SPINE WO CONTRAST (Report)       MRI LUMBAR SPINE WITHOUT CONTRAST   INDICATION: Low back pain and bilateral hip pain  Leg weakness  COMPARISON: None  TECHNIQUE: Sagittal T1, sagittal T2, sagittal inversion recovery, axial T1 and axial T2, coronal T2     IMAGE QUALITY: Diagnostic   FINDINGS:   ALIGNMENT: Normal alignment of the lumbar spine  No compression fracture  No spondylolysis or spondylolisthesis  No scoliosis  MARROW SIGNAL: Endplate marrow degenerative change at L5-S1    DISTAL CORD AND CONUS: Normal size and signal within the distal cord and conus  The conus ends at the L1 level  PARASPINAL SOFT TISSUES: Paraspinal soft tissues are unremarkable  SACRUM: Normal signal within the sacrum  No evidence of insufficiency or stress fracture  LOWER THORACIC DISC SPACES: Normal disc height and signal  No disc herniation, canal stenosis or foraminal narrowing  LUMBAR DISC SPACES:      L1-L2: Normal    L2-L3: Normal disc height and signal without disc herniation or canal stenosis  No foraminal narrowing  L3-L4: Normal    L4-L5: Normal disc height  Minimal annular bulging and facet degenerative change with trace facet effusions  No canal stenosis  Minimal foraminal narrowing without nerve impingement  L5-S1: Loss of disc height   Mild annular bulging with endplate and facet degenerative change  Trace effusions  No canal stenosis  Severe left and moderate right foraminal narrowing  IMPRESSION:   Mild lumbar degenerative disc disease with annular bulging and minimal facet degenerative change  At L5-S1 there is severe left and moderate right foraminal narrowing  No cauda equina compression  Workstation performed: [id number]   Signed by:  Maria Teresa Stephens DO  9/26/17     * XR SPINE LUMBAR MINIMUM 4 VIEWS NON INJURY [id number] 02:00PM José Miguel Ramon    Order Number: [id number]     Test Name Result Flag Reference   XR SPINE LUMBAR MINIMUM 4 VIEWS (Report)       LUMBAR SPINE   INDICATION: Lower back pain  COMPARISON: None   VIEWS: AP, lateral, bilateral oblique and coned down projections   IMAGES: 4   FINDINGS:   Mild thoracolumbar levoscoliosis  There is no radiographic evidence of acute fracture or destructive osseous lesion  Vacuum disc phenomenon/degenerative disc disease at L5-S1  Occasional Schmorl's nodes, incidentally noted  Aorta and iliac vessels are partially calcified  IMPRESSION:   Degenerative disc disease, L5-S1  Workstation performed: [id number]   Signed by:  Darnell Ayers MD  9/26/17     Future Appointments    Date/Time Provider Specialty Site   11/01/2017 10:40 AM LEVI Mazariegos   Orthopedic Surgery [facility]       Signatures   Electronically signed by : Scott Melendez DO; Oct  6 2017  3:49PM EST                       (Author)

## 2017-11-01 ENCOUNTER — TRANSCRIBE ORDERS (OUTPATIENT)
Dept: ADMINISTRATIVE | Facility: HOSPITAL | Age: 67
End: 2017-11-01

## 2017-11-01 ENCOUNTER — ALLSCRIPTS OFFICE VISIT (OUTPATIENT)
Dept: OTHER | Facility: OTHER | Age: 67
End: 2017-11-01

## 2017-11-01 DIAGNOSIS — R26.2 CANNOT WALK: Primary | ICD-10-CM

## 2017-11-01 DIAGNOSIS — G62.89: ICD-10-CM

## 2017-11-01 DIAGNOSIS — E11.9 TYPE 2 DIABETES MELLITUS WITHOUT COMPLICATIONS (HCC): ICD-10-CM

## 2017-11-01 DIAGNOSIS — R91.1 SOLITARY PULMONARY NODULE: ICD-10-CM

## 2017-11-01 DIAGNOSIS — R26.2 DIFFICULTY IN WALKING, NOT ELSEWHERE CLASSIFIED: ICD-10-CM

## 2017-11-02 NOTE — PROGRESS NOTES
Chief Complaint  1  Back Pain  Lower back and bilateral leg pains and weakness      Assessment  1  Lumbar radiculopathy (724 4) (M54 16)   2  Lumbar back pain with radiculopathy affecting right lower extremity (724 4) (M54 17)   3  Bilateral leg weakness (729 89) (R29 898)   4  Ambulatory dysfunction (719 7) (R26 2)    Chronic lower back and bilateral leg pain  Bilateral leg weakness proximal to his stenosis at the L5-S1 level confirmed on MRI     Discussion/Summary  Significant profound weakness involving bilateral lower extremities not explained by the current lumbar spine MRI  Advanced imaging of proximal spinal axis is required        History of Present Illness  HPI: Patient presents for evaluation of worsening lower back and bilateral leg pain including weakness  He has had symptoms for 10 months  He reports weakness involving his proximal thighs and hips down into his feet  He has been diagnosed with lumbar DDD and foraminal stenosis bilaterally at L5-S1  Injections have not provided relief  He is undergoing physical therapy  He reports difficulty with walking and relies on a walker  He denies incontinence of bowel or bladder  Denies any recent fevers chills or constitutional symptoms  Review of Systems    Constitutional: No fever or chills, feels well, no tiredness, no recent weight loss or weight gain  Eyes: No complaints of red eyes, no eyesight problems  ENT: no complaints of loss of hearing, no nosebleeds, no sore throat  Cardiovascular: No complaints of chest pain, no palpitations, no leg claudication or lower extremity edema  Respiratory: No complaints of shortness of breath, no wheezing, no cough  Gastrointestinal: No complaints of abdominal pain, no constipation, no nausea or vomiting, no diarrhea or bloody stools  Genitourinary: No complaints of dysuria or incontinence, no hesitancy, no nocturia  Musculoskeletal: arthralgias,-- limb pain-- and-- myalgias, but-- as noted in HPI  Neurological: numbness-- and-- tingling, but-- as noted in HPI  Endocrine: muscle weakness-- and-- feelings of weakness, but-- as noted in HPI  Active Problems  1  Ambulatory dysfunction (719 7) (R26 2)   2  Bilateral leg weakness (729 89) (R29 898)   3  Diabetes mellitus (250 00) (E11 9)   4  Diabetic neuropathy (250 60,357 2) (E11 40)   5  Dyslipidemia (272 4) (E78 5)   6  Exercise counseling (V65 41) (Z71 82)   7  Herniated lumbar intervertebral disc (722 10) (M51 26)   8  Lumbar back pain with radiculopathy affecting right lower extremity (724 4) (M54 17)   9  Lumbar foraminal stenosis (724 02) (M99 83)   10  Lumbar radiculopathy (724 4) (M54 16)   11   Lumbar spondylosis (721 3) (M47 816)    Past Medical History   · History of Abnormal CT of the abdomen (793 6) (R93 5)   · History of Atypical chest pain (786 59) (R07 89)   · History of Bilateral hip pain (719 45) (M25 551,M25 552)   · History of Depression screening (V79 0) (Z13 89)   · History of Hamstring muscle strain, left, initial encounter (843 8) (R83 555R)   · History of abdominal pain (V13 89) (F47 009)   · History of iron deficiency anemia (V12 3) (Z86 2)   · History of pneumococcal vaccination (V49 89) (Z92 29)   · History of transient cerebral ischemia (V12 54) (Z86 73)   · History of Medicare annual wellness visit, subsequent (V70 0) (Z00 00)   · History of Need for hepatitis C screening test (V73 89) (Z11 59)   · History of Renal colic on right side (656 6) (N23)   · History of Screening for diabetic peripheral neuropathy (V80 09) (Z13 89)   · History of Screening for genitourinary condition (V81 6) (Z13 89)   · History of Screening for neurological condition (V80 09) (Z13 89)   · History of Special screening examination for neoplasm of prostate (V76 44) (Z12 5)   · History of Trochanteric bursitis (726 5) (M70 60)   · History of Visit for preventive health examination (V70 0) (Z00 00)    Surgical History   · History Of Prior Surgery    Family History  Family History    · No pertinent family history    Social History   · Former smoker (V15 82) (T76 332)   · No advance directives (V49 89) (Z78 9)   · History of No advance directives (V49 89) (Z78 9)   · No living will   · Social alcohol use (Z78 9)    Allergies  1  No Known Drug Allergies    Vitals   Recorded: 46PVD8731 10:35AM   Heart Rate 99   Systolic 048   Diastolic 78   Height 6 ft    Weight 154 lb 8 0 oz   BMI Calculated 20 95   BSA Calculated 1 91     Physical Exam  Awake alert and oriented x3  Unable to walk without assistance  In the seated position he has 3+ out of 5 strength with bilateral hip flexion  He has 3+ out of 5 strength with bilateral knee extension  He has a footdrop on the right  4/5 strength with foot dorsiflexion on the left  He demonstrates 4+ out of 5 strength with foot plantar flexion bilaterally  Reflexes are absent at L4 and S1 symmetrically  No clonus  Plan  Ambulatory dysfunction    · MethylPREDNISolone 4 MG Oral Tablet Therapy Pack (Medrol); Medrol dose smiley   · Follow-up After MRI Evaluation and Treatment  Follow-up  Status: Hold For - Scheduling   Requested for: 86MXO6515   · * MRI CERVICAL SPINE WO CONTRAST; Status:Need Information - Financial  Authorization; Requested for:01Nov2017;    · * MRI THORACIC SPINE WO CONTRAST; Status:Need Information - Financial  Authorization; Requested for:01Nov2017;    · EMG TWO EXTREMITIES WITH OR W/O RELATED PARASPINAL AREAS; Status:Hold  For - Scheduling; Requested for:01Nov2017;   TWO : RLE  ONE : LLE      He will need an MRI of the cervical and thoracic spine to help rule out any cord lesions or stenosis that could explain his proximal weakness  He will need an EMG NCV of the bilateral lower extremities  Medrol Dosepak  Patient is counseled to monitor his blood sugars while on this medication       Signatures   Electronically signed by : LEVI Beal ; Nov 1 2017 11:12AM EST (Author)

## 2017-11-07 ENCOUNTER — HOSPITAL ENCOUNTER (OUTPATIENT)
Dept: MRI IMAGING | Facility: HOSPITAL | Age: 67
Discharge: HOME | End: 2017-11-07
Payer: COMMERCIAL

## 2017-11-07 DIAGNOSIS — R26.2 CANNOT WALK: ICD-10-CM

## 2017-11-07 PROCEDURE — 72146 MRI CHEST SPINE W/O DYE: CPT

## 2017-11-07 PROCEDURE — 72141 MRI NECK SPINE W/O DYE: CPT

## 2017-11-08 ENCOUNTER — GENERIC CONVERSION - ENCOUNTER (OUTPATIENT)
Dept: OTHER | Facility: OTHER | Age: 67
End: 2017-11-08

## 2017-11-20 ENCOUNTER — HOSPITAL ENCOUNTER (OUTPATIENT)
Dept: NEUROLOGY | Facility: CLINIC | Age: 67
Discharge: HOME | End: 2017-11-20
Payer: COMMERCIAL

## 2017-11-20 DIAGNOSIS — R26.2 CANNOT WALK: ICD-10-CM

## 2017-11-20 DIAGNOSIS — M54.16 LUMBAR RADICULOPATHY: ICD-10-CM

## 2017-11-20 DIAGNOSIS — G62.9 NEUROPATHY: ICD-10-CM

## 2017-11-20 PROCEDURE — 95911 NRV CNDJ TEST 9-10 STUDIES: CPT

## 2017-11-20 PROCEDURE — 95886 MUSC TEST DONE W/N TEST COMP: CPT

## 2017-11-22 ENCOUNTER — GENERIC CONVERSION - ENCOUNTER (OUTPATIENT)
Dept: OTHER | Facility: OTHER | Age: 67
End: 2017-11-22

## 2017-11-28 ENCOUNTER — ALLSCRIPTS OFFICE VISIT (OUTPATIENT)
Dept: OTHER | Facility: OTHER | Age: 67
End: 2017-11-28

## 2017-11-29 ENCOUNTER — APPOINTMENT (OUTPATIENT)
Dept: LAB | Facility: MEDICAL CENTER | Age: 67
End: 2017-11-29
Payer: COMMERCIAL

## 2017-11-29 ENCOUNTER — TRANSCRIBE ORDERS (OUTPATIENT)
Dept: RADIOLOGY | Facility: MEDICAL CENTER | Age: 67
End: 2017-11-29

## 2017-11-29 DIAGNOSIS — E55.9 VITAMIN D DEFICIENCY DISEASE: ICD-10-CM

## 2017-11-29 DIAGNOSIS — E13.49 OTHER DIABETIC NEUROLOGICAL COMPLICATION ASSOCIATED WITH OTHER SPECIFIED DIABETES MELLITUS (HCC): ICD-10-CM

## 2017-11-29 DIAGNOSIS — G62.89 DISEASE RELATED PERIPHERAL NEUROPATHY: Primary | ICD-10-CM

## 2017-11-29 DIAGNOSIS — E53.8 BIOTIN-(PROPIONYL-COA-CARBOXYLASE) LIGASE DEFICIENCY: ICD-10-CM

## 2017-11-29 DIAGNOSIS — E11.9 TYPE 2 DIABETES MELLITUS WITHOUT COMPLICATIONS (HCC): ICD-10-CM

## 2017-11-29 LAB
25(OH)D3 SERPL-MCNC: 13.6 NG/ML (ref 30–100)
ALBUMIN SERPL BCP-MCNC: 3.8 G/DL (ref 3.5–5)
ALP SERPL-CCNC: 46 U/L (ref 46–116)
ALT SERPL W P-5'-P-CCNC: 16 U/L (ref 12–78)
ANION GAP SERPL CALCULATED.3IONS-SCNC: 7 MMOL/L (ref 4–13)
AST SERPL W P-5'-P-CCNC: 8 U/L (ref 5–45)
BASOPHILS # BLD AUTO: 0.01 THOUSANDS/ΜL (ref 0–0.1)
BASOPHILS NFR BLD AUTO: 0 % (ref 0–1)
BILIRUB SERPL-MCNC: 0.58 MG/DL (ref 0.2–1)
BUN SERPL-MCNC: 15 MG/DL (ref 5–25)
CALCIUM SERPL-MCNC: 9.1 MG/DL (ref 8.3–10.1)
CHLORIDE SERPL-SCNC: 104 MMOL/L (ref 100–108)
CO2 SERPL-SCNC: 28 MMOL/L (ref 21–32)
CREAT SERPL-MCNC: 0.71 MG/DL (ref 0.6–1.3)
EOSINOPHIL # BLD AUTO: 0.05 THOUSAND/ΜL (ref 0–0.61)
EOSINOPHIL NFR BLD AUTO: 1 % (ref 0–6)
ERYTHROCYTE [DISTWIDTH] IN BLOOD BY AUTOMATED COUNT: 13.2 % (ref 11.6–15.1)
ERYTHROCYTE [SEDIMENTATION RATE] IN BLOOD: 6 MM/HOUR (ref 0–10)
EST. AVERAGE GLUCOSE BLD GHB EST-MCNC: 192 MG/DL
FOLATE SERPL-MCNC: 15.4 NG/ML (ref 3.1–17.5)
GFR SERPL CREATININE-BSD FRML MDRD: 97 ML/MIN/1.73SQ M
GLUCOSE P FAST SERPL-MCNC: 164 MG/DL (ref 65–99)
HBA1C MFR BLD: 8.3 % (ref 4.2–6.3)
HCT VFR BLD AUTO: 43.2 % (ref 36.5–49.3)
HGB BLD-MCNC: 15 G/DL (ref 12–17)
LYMPHOCYTES # BLD AUTO: 2.3 THOUSANDS/ΜL (ref 0.6–4.47)
LYMPHOCYTES NFR BLD AUTO: 26 % (ref 14–44)
MCH RBC QN AUTO: 31.8 PG (ref 26.8–34.3)
MCHC RBC AUTO-ENTMCNC: 34.7 G/DL (ref 31.4–37.4)
MCV RBC AUTO: 92 FL (ref 82–98)
MONOCYTES # BLD AUTO: 0.68 THOUSAND/ΜL (ref 0.17–1.22)
MONOCYTES NFR BLD AUTO: 8 % (ref 4–12)
NEUTROPHILS # BLD AUTO: 5.91 THOUSANDS/ΜL (ref 1.85–7.62)
NEUTS SEG NFR BLD AUTO: 65 % (ref 43–75)
NRBC BLD AUTO-RTO: 0 /100 WBCS
PLATELET # BLD AUTO: 292 THOUSANDS/UL (ref 149–390)
PMV BLD AUTO: 10.2 FL (ref 8.9–12.7)
POTASSIUM SERPL-SCNC: 3.6 MMOL/L (ref 3.5–5.3)
PROT SERPL-MCNC: 7.1 G/DL (ref 6.4–8.2)
RBC # BLD AUTO: 4.71 MILLION/UL (ref 3.88–5.62)
SODIUM SERPL-SCNC: 139 MMOL/L (ref 136–145)
TSH SERPL DL<=0.05 MIU/L-ACNC: 1.67 UIU/ML (ref 0.36–3.74)
VIT B12 SERPL-MCNC: 221 PG/ML (ref 100–900)
WBC # BLD AUTO: 8.97 THOUSAND/UL (ref 4.31–10.16)

## 2017-11-29 PROCEDURE — 85652 RBC SED RATE AUTOMATED: CPT

## 2017-11-29 PROCEDURE — 82306 VITAMIN D 25 HYDROXY: CPT

## 2017-11-29 PROCEDURE — 80053 COMPREHEN METABOLIC PANEL: CPT

## 2017-11-29 PROCEDURE — 86618 LYME DISEASE ANTIBODY: CPT

## 2017-11-29 PROCEDURE — 84443 ASSAY THYROID STIM HORMONE: CPT

## 2017-11-29 PROCEDURE — 86038 ANTINUCLEAR ANTIBODIES: CPT

## 2017-11-29 PROCEDURE — 83036 HEMOGLOBIN GLYCOSYLATED A1C: CPT

## 2017-11-29 PROCEDURE — 36415 COLL VENOUS BLD VENIPUNCTURE: CPT

## 2017-11-29 PROCEDURE — 84165 PROTEIN E-PHORESIS SERUM: CPT

## 2017-11-29 PROCEDURE — 82607 VITAMIN B-12: CPT

## 2017-11-29 PROCEDURE — 85025 COMPLETE CBC W/AUTO DIFF WBC: CPT

## 2017-11-29 PROCEDURE — 84207 ASSAY OF VITAMIN B-6: CPT

## 2017-11-29 PROCEDURE — 86592 SYPHILIS TEST NON-TREP QUAL: CPT

## 2017-11-29 PROCEDURE — 84630 ASSAY OF ZINC: CPT

## 2017-11-29 PROCEDURE — 82746 ASSAY OF FOLIC ACID SERUM: CPT

## 2017-11-29 NOTE — PROGRESS NOTES
Assessment    1  No advance directives (V49 89) (Z78 9)   2  Former smoker (V15 82) (J28 178)   3  Cigar smoker (305 1) (F17 290)   · 5-6 mini's   4  Bilateral leg weakness (729 89) (R29 898)   5  Ambulatory dysfunction (719 7) (R26 2)   6  Diabetes mellitus (250 00) (E11 9)   7  Disease related peripheral neuropathy (356 8) (G62 89)   8  Pulmonary nodule seen on imaging study (793 11) (R91 1)    Plan  B12 neuropathy, Disease related peripheral neuropathy    · (1) VITAMIN B12; Status:Active; Requested for:28Nov2017;   B12 neuropathy, Neuropathy, peripheral, idiopathic    · (1) VITAMIN B6; Status:Active; Requested for:28Nov2017;   Diabetes mellitus    · OneTouch Ultra Mini w/Device Kit; USE AS DIRECTED  Dx 250 E11 9 NIDDIncluding, Battery, control solution, and lancing device   · (1) BASIC METABOLIC PROFILE; Status:Active - Retrospective By ProtocolAuthorization; Requested for:28Nov2017;    · (1) HEMOGLOBIN A1C; Status:Active - Retrospective By Protocol Authorization; Requested for:28Nov2017;    · (1) MICROALBUMIN CREATININE RATIO, RANDOM URINE; Status:Active -Retrospective By Protocol Authorization; Requested for:28Nov2017;   Diabetic neuropathy, Mild vitamin D deficiency    · (1) TSH WITH FT4 REFLEX; Status:Active; Requested for:28Nov2017;   Disease related peripheral neuropathy    · (1) DONAVON SCREEN W/REFLEX TO TITER/PATTERN; Status:Active; Requestedfor:28Nov2017;    · (1) CBC/PLT/DIFF; Status:Active; Requested for:28Nov2017;    · (1) COMPREHENSIVE METABOLIC PANEL; Status:Active; Requested for:28Nov2017;    · (1) FOLATE; Status:Active; Requested for:28Nov2017;    · (1) LYME ANTIBODY PROFILE W/REFLEX TO WESTERN BLOT; Status:Active; Requested for:28Nov2017;    · (1) PROTEIN ELECTRO, SERUM; Status:Active; Requested for:28Nov2017;    · (1) RPR; Status:Active; Requested for:28Nov2017;    · (1) SED RATE; Status:Active; Requested for:28Nov2017;    · (1) ZINC; Status:Active;  Requested for:28Nov2017;   Disease related peripheral neuropathy, Pulmonary nodule seen on imaging study    · * CT CHEST ABDOMEN PELVIS W CONTRAST; Status:Need Information - FinancialAuthorization; Requested for:28Nov2017;   Mild vitamin D deficiency, Neuropathy, peripheral, idiopathic    · (1) VITAMIN D 25-HYDROXY; Status:Active; Requested for:28Nov2017;     Discussion/Summary    Patliinocencia needs a workup for peripheral neuroopathy  Chief Complaint  Patient is here today with the complaints of a unexplained weight loss on muscular atrophy but muscular dysfunction ambulatory dysfunction neuropathy and then of course he has lumbar and cervical spinal stenosis however Dr Luis klein does not believe that his muscular weakness matches the lesions in his spine patient is set up for Neurology sometime in January and will probably need EMG studies and further diagnostic studies in the meantime he needs to be evaluated for possible Lyme disease or occult neoplasm as an etiology for his neuropathy      History of Present Illness  HPI: see history of chief regarding generalized ambulation patient also has unexplained weight loss      Review of Systems   Constitutional: recent weight loss  ENT: no complaints of earache, no loss of hearing, no nosebleeds or nasal discharge, no sore throat or hoarseness  Cardiovascular: no complaints of slow or fast heart rate, no chest pain, no palpitations, no leg claudication or lower extremity edema  Respiratory: no complaints of shortness of breath, no wheezing or cough, no dyspnea on exertion, no orthopnea or PND  Gastrointestinal: no complaints of abdominal pain, no constipation, no nausea or vomiting, no diarrhea or bloody stools  Genitourinary: no complaints of dysuria or incontinence, no hesitancy, no nocturia, no genital lesion, no inadequacy of penile erection  Musculoskeletal: inability to ambulate  Integumentary: no complaints of skin rash or lesion, no itching or dry skin, no skin wounds    Neurological: sensory motor neuropathy  ROS reviewed  Active Problems  1  Ambulatory dysfunction (719 7) (R26 2)   2  Bilateral leg weakness (729 89) (R29 898)   3  Diabetes mellitus (250 00) (E11 9)   4  Diabetic neuropathy (250 60,357 2) (E11 40)   5  Dyslipidemia (272 4) (E78 5)   6  Exercise counseling (V65 41) (Z71 82)   7  Herniated lumbar intervertebral disc (722 10) (M51 26)   8  Lumbar back pain with radiculopathy affecting right lower extremity (724 4) (M54 17)   9  Lumbar foraminal stenosis (724 02) (M99 83)   10  Lumbar radiculopathy (724 4) (M54 16)   11  Lumbar spondylosis (721 3) (M47 816)    Past Medical History  1  History of Abnormal CT of the abdomen (793 6) (R93 5)   2  History of Atypical chest pain (786 59) (R07 89)   3  History of Bilateral hip pain (719 45) (M25 551,M25 552)   4  History of Depression screening (V79 0) (Z13 89)   5  History of Hamstring muscle strain, left, initial encounter (843 8) (K85 015W)   6  History of abdominal pain (V13 89) (Z87 898)   7  History of iron deficiency anemia (V12 3) (Z86 2)   8  History of pneumococcal vaccination (V49 89) (Z92 29)   9  History of transient cerebral ischemia (V12 54) (Z86 73)   10  History of Medicare annual wellness visit, subsequent (V70 0) (Z00 00)   11  History of Need for hepatitis C screening test (V73 89) (Z11 59)   12  History of Renal colic on right side (594 8) (N23)   13  History of Screening for diabetic peripheral neuropathy (V80 09) (Z13 89)   14  History of Screening for genitourinary condition (V81 6) (Z13 89)   15  History of Screening for neurological condition (V80 09) (Z13 89)   16  History of Special screening examination for neoplasm of prostate (V76 44) (Z12 5)   17  History of Trochanteric bursitis (726 5) (M70 60)   18  History of Visit for preventive health examination (V70 0) (Z00 00)  Active Problems And Past Medical History Reviewed: The active problems and past medical history were reviewed and updated today  Family History  Family History    1  No pertinent family history  Family History Reviewed: The family history was reviewed and updated today  Social History   · Cigar smoker (305 1) (F17 290)   · 5-6 mini's   · Former smoker (V15 82) (M96 642)   · No advance directives (V49 89) (Z78 9)   · History of No advance directives (V49 89) (Z78 9)   · No living will   · Social alcohol use (Z78 9)  The social history was reviewed and updated today  The social history was reviewed and is unchanged  Surgical History    1  History Of Prior Surgery  Surgical History Reviewed: The surgical history was reviewed and updated today  Current Meds   1  Accu-Chek Karen Plus In Vitro Strip; test bid; Therapy: 19NMQ8619 to (Last Rx:19May2015) Ordered   2  Accu-Chek Softclix Lancets Miscellaneous; test twice daily; Therapy: 79ZRO0837 to (Last Rx:19May2015) Ordered   3  Aspirin-Dipyridamole ER  MG Oral Capsule Extended Release 12 Hour; Take 1 capsule twice daily; Therapy: 48Cjb6276 to (Evaluate:12May2018)  Requested for: 76ERF9569; Last Rx:13Nov2017 Ordered   4  Gabapentin 300 MG Oral Capsule; TAKE 1 CAPSULE TWICE DAILY; Therapy: 82IGR3982 to (Evaluate:87Msy1478)  Requested for: 36WFY4296; Last Rx:22Nov2017 Ordered   5  Glyxambi 25-5 MG Oral Tablet; one po qd; Therapy: 89BPA5945 to (Evaluate:10Jul2017)  Requested for: 13JUT5911; Last Rx:11Jan2017 Ordered   6  MetFORMIN HCl - 1000 MG Oral Tablet; Take 1 tablet twice daily; Therapy: 03QNP7936 to (Evaluate:06Jan2018)  Requested for: 50KBK5662; Last Rx:36Qtz8144 Ordered   7  Tylenol 325 MG Oral Capsule; TAKE 2 CAPSULE Twice daily; Therapy: (Recorded:28Nov2017) to Recorded    The medication list was reviewed and updated today  Allergies  1   No Known Drug Allergies    Vitals   Recorded: 88FDP6661 48:45KK   Systolic 473   Diastolic 64   Height 6 ft    Weight 152 lb 9 6 oz   BMI Calculated 20 7   BSA Calculated 1 9       Physical Exam   Constitutional  General appearance: No acute distress, well appearing and well nourished  Eyes  Conjunctiva and lids: No swelling, erythema, or discharge  Pupils and irises: Equal, round and reactive to light  Ears, Nose, Mouth, and Throat  External inspection of ears and nose: Normal    Otoscopic examination: Tympanic membrance translucent with normal light reflex  Canals patent without erythema  Nasal mucosa, septum, and turbinates: Normal without edema or erythema  Oropharynx: Normal with no erythema, edema, exudate or lesions  Pulmonary  Respiratory effort: No increased work of breathing or signs of respiratory distress  Auscultation of lungs: Clear to auscultation, equal breath sounds bilaterally, no wheezes, no rales, no rhonci  Cardiovascular  Palpation of heart: Normal PMI, no thrills  Auscultation of heart: Normal rate and rhythm, normal S1 and S2, without murmurs  Examination of extremities for edema and/or varicosities: Normal    Carotid pulses: Normal    Abdomen  Abdomen: Non-tender, no masses  Liver and spleen: No hepatomegaly or splenomegaly  Lymphatic  Palpation of lymph nodes in neck: No lymphadenopathy  Musculoskeletal  Gait and station: Abnormal  -- ambulates with a wheeled walker wilth difficulty  Digits and nails: Normal without clubbing or cyanosis  Inspection/palpation of joints, bones, and muscles: Abnormal  -- severe muscle atrophy both legs  Skin  Skin and subcutaneous tissue: Normal without rashes or lesions  Neurologic  Cranial nerves: Cranial nerves 2-12 intact  Reflexes: Abnormal  -- absent  Sensation: No sensory loss     Psychiatric  Orientation to person, place and time: Normal    Mood and affect: Normal          Future Appointments    Date/Time Provider Specialty Site   01/11/2018 10:30 AM Janae Hsu MD Neurology [address] [place] [address]       Signatures   Electronically signed by : Christine Rodriguez DO; Nov 28 2017  1:21PM EST                       (Author)

## 2017-11-30 ENCOUNTER — GENERIC CONVERSION - ENCOUNTER (OUTPATIENT)
Dept: OTHER | Facility: OTHER | Age: 67
End: 2017-11-30

## 2017-11-30 LAB
B BURGDOR IGG SER IA-ACNC: 0.21
B BURGDOR IGM SER IA-ACNC: 0.38
RPR SER QL: NORMAL

## 2017-12-01 ENCOUNTER — GENERIC CONVERSION - ENCOUNTER (OUTPATIENT)
Dept: OTHER | Facility: OTHER | Age: 67
End: 2017-12-01

## 2017-12-01 LAB
RYE IGE QN: NEGATIVE
ZINC SERPL-MCNC: 99 UG/DL (ref 56–134)

## 2017-12-02 LAB
ALBUMIN SERPL ELPH-MCNC: 4.48 G/DL (ref 3.5–5)
ALBUMIN SERPL ELPH-MCNC: 62.2 % (ref 52–65)
ALPHA1 GLOB SERPL ELPH-MCNC: 0.32 G/DL (ref 0.1–0.4)
ALPHA1 GLOB SERPL ELPH-MCNC: 4.4 % (ref 2.5–5)
ALPHA2 GLOB SERPL ELPH-MCNC: 0.85 G/DL (ref 0.4–1.2)
ALPHA2 GLOB SERPL ELPH-MCNC: 11.8 % (ref 7–13)
BETA GLOB ABNORMAL SERPL ELPH-MCNC: 0.45 G/DL (ref 0.4–0.8)
BETA1 GLOB SERPL ELPH-MCNC: 6.3 % (ref 5–13)
BETA2 GLOB SERPL ELPH-MCNC: 4.8 % (ref 2–8)
BETA2+GAMMA GLOB SERPL ELPH-MCNC: 0.35 G/DL (ref 0.2–0.5)
GAMMA GLOB ABNORMAL SERPL ELPH-MCNC: 0.76 G/DL (ref 0.5–1.6)
GAMMA GLOB SERPL ELPH-MCNC: 10.5 % (ref 12–22)
IGG/ALB SER: 1.65 {RATIO} (ref 1.1–1.8)
PROT PATTERN SERPL ELPH-IMP: ABNORMAL
PROT SERPL-MCNC: 7.2 G/DL (ref 6.4–8.2)

## 2017-12-04 ENCOUNTER — GENERIC CONVERSION - ENCOUNTER (OUTPATIENT)
Dept: OTHER | Facility: OTHER | Age: 67
End: 2017-12-04

## 2017-12-04 LAB — VIT B6 SERPL-MCNC: 8.2 UG/L (ref 5.3–46.7)

## 2017-12-05 ENCOUNTER — GENERIC CONVERSION - ENCOUNTER (OUTPATIENT)
Dept: OTHER | Facility: OTHER | Age: 67
End: 2017-12-05

## 2017-12-06 ENCOUNTER — HOSPITAL ENCOUNTER (OUTPATIENT)
Dept: CT IMAGING | Facility: HOSPITAL | Age: 67
Discharge: HOME | End: 2017-12-06
Attending: FAMILY MEDICINE
Payer: COMMERCIAL

## 2017-12-06 DIAGNOSIS — R91.1 SOLITARY PULMONARY NODULE: ICD-10-CM

## 2017-12-06 DIAGNOSIS — G62.89: ICD-10-CM

## 2017-12-06 PROCEDURE — 74177 CT ABD & PELVIS W/CONTRAST: CPT

## 2017-12-06 PROCEDURE — 71260 CT THORAX DX C+: CPT

## 2017-12-06 RX ADMIN — IOHEXOL 100 ML: 350 INJECTION, SOLUTION INTRAVENOUS at 11:15

## 2017-12-11 ENCOUNTER — GENERIC CONVERSION - ENCOUNTER (OUTPATIENT)
Dept: OTHER | Facility: OTHER | Age: 67
End: 2017-12-11

## 2017-12-13 ENCOUNTER — GENERIC CONVERSION - ENCOUNTER (OUTPATIENT)
Dept: OTHER | Facility: OTHER | Age: 67
End: 2017-12-13

## 2017-12-15 DIAGNOSIS — M54.17 RADICULOPATHY OF LUMBOSACRAL REGION: ICD-10-CM

## 2017-12-15 DIAGNOSIS — G45.9 TRANSIENT CEREBRAL ISCHEMIC ATTACK: ICD-10-CM

## 2017-12-15 DIAGNOSIS — M54.16 RADICULOPATHY OF LUMBAR REGION: ICD-10-CM

## 2017-12-15 DIAGNOSIS — R29.898 OTHER SYMPTOMS AND SIGNS INVOLVING THE MUSCULOSKELETAL SYSTEM: ICD-10-CM

## 2017-12-15 DIAGNOSIS — M47.816 SPONDYLOSIS OF LUMBAR REGION WITHOUT MYELOPATHY OR RADICULOPATHY: ICD-10-CM

## 2017-12-15 DIAGNOSIS — G60.9 HEREDITARY AND IDIOPATHIC NEUROPATHY: ICD-10-CM

## 2017-12-15 DIAGNOSIS — R26.2 DIFFICULTY IN WALKING, NOT ELSEWHERE CLASSIFIED: ICD-10-CM

## 2017-12-15 DIAGNOSIS — M51.26 OTHER INTERVERTEBRAL DISC DISPLACEMENT, LUMBAR REGION: ICD-10-CM

## 2018-01-09 NOTE — RESULT NOTES
Verified Results  * CT ABDOMEN PELVIS WO CONTRAST 32Rlw8510 09:09AM Naima Ely Order Number: [id number]    - Patient Instructions: To schedule this appointment, please contact Central Scheduling at [phone]   Order Number: [id number]    - Patient Instructions: To schedule this appointment, please contact Central Scheduling at [phone]  Test Name Result Flag Reference   CT ABDOMEN PELVIS WO CONTRAST (Report)     CT ABDOMEN AND PELVIS WITHOUT IV CONTRAST     INDICATION: Right flank pain     COMPARISON: None  TECHNIQUE: CT examination of the abdomen and pelvis was performed without intravenous contrast  This examination, like all CT scans performed in the [facility], was performed utilizing techniques to minimize radiation dose exposure,    including the use of iterative reconstruction and automated exposure control  Axial, sagittal, and coronal reformatted images were submitted for interpretation  Intravenous contrast was not administered as part of this examination  Enteric contrast    was not administered  FINDINGS:     ABDOMEN     LOWER CHEST: There is a 2 mm left lower lobe nodule on series 2, image 2  LIVER/BILIARY TREE: Unremarkable  GALLBLADDER: No calcified gallstones  No pericholecystic inflammatory change  SPLEEN: Unremarkable  PANCREAS: Unremarkable  ADRENAL GLANDS: Unremarkable  KIDNEYS/URETERS: Unremarkable  No hydronephrosis  STOMACH AND BOWEL: Unremarkable  APPENDIX: No findings to suggest appendicitis  ABDOMINOPELVIC CAVITY: No ascites or free intraperitoneal air  There is mild stranding in the small bowel mesentery without pathologic lymphadenopathy  Adjacent to the left adrenal gland is a soft tissue nodule measuring 1 3 x 1 4 cm which may    represent a mildly enlarged retroperitoneal lymph node  There is no other adenopathy within the abdomen or pelvis  VESSELS: Unremarkable for patient's age  PELVIS     REPRODUCTIVE ORGANS: Unremarkable for patient's age  URINARY BLADDER: Unremarkable  ABDOMINAL WALL/INGUINAL REGIONS: Unremarkable  OSSEOUS STRUCTURES: No acute fracture or destructive osseous lesion  IMPRESSION:     1  No urinary tract calculi  No hydronephrosis  2  Mild stranding in the small bowel mesentery without pathologic lymphadenopathy  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 though early lymphoma can have a similar appearance  Six-month follow-up CT is    recommended  3  Mildly enlarged retroperitoneal lymph node adjacent to the left adrenal gland  This can also be reevaluated on follow-up  4  2 mm left lower lobe lung nodule  According to guidelines by the Fleischner society (Radiology 2005; 289:085-286) in patients with low risk for lung cancer and nodules less than 5 mm in diameter no further imaging is required  In patients with a    higher risk, such as those who are smokers, follow-up is recommended in one year  Patients with a known malignancy and are at increased risk of metastasis should receive three month follow-up       ##sigslh##sigslh     ##fuslh6##fuslh6   ##fuslh12##fuslh12          Workstation performed: [id number]     Signed by:   Sasha Jack MD   9/13/16

## 2018-01-10 NOTE — RESULT NOTES
Verified Results  (1) HEMOGLOBIN A1C 22Apr2016 07:03AM Rula Servant   5 7-6 4% impaired fasting glucose  >=6 5% diagnosis of diabetes    Falsely low levels are seen in conditions linked to short RBC life span-  hemolytic anemia, and splenomegaly  Falsely elevated levels are seen in situations where there is an increased production of RBC- receipt of erythropoietin or blood transfusions  Adopted from ADA-Clinical Practice Recommendations     Test Name Result Flag Reference   HEMOGLOBIN A1C 7 9 % H 4 0-5 6   EST  AVG   GLUCOSE 180 mg/dl

## 2018-01-10 NOTE — MISCELLANEOUS
Message   Recorded as Task   Date: 10/11/2017 09:07 AM, Created By: Milan Voss   Task Name: Miscellaneous   Assigned To: John Ulrich Procedure,Team   Regarding Patient: Paolo Mtz, Status: Active   Comment:    Micheline Morales - 11 Oct 2017 9:07 AM     TASK CREATED  spoke with pt Soni Palmer per consent in pt chart , scheduled procedure L5 TFESI for 10/19  went over Aggrenox hold with , pt will start hold on 10/15  went over properative instructions with pt daughter  anay verbalized understanding to all above  Active Problems    1  Atypical chest pain (786 59) (R07 89)   2  Bilateral hip pain (719 45) (M25 551,M25 552)   3  Diabetes mellitus (250 00) (E11 9)   4  Diabetic neuropathy (250 60,357 2) (E11 40)   5  Dyslipidemia (272 4) (E78 5)   6  Hamstring muscle strain, left, initial encounter (843 8) (S76 312A)   7  Herniated lumbar intervertebral disc (722 10) (M51 26)   8  Iron deficiency anemia (280 9) (D50 9)   9  Lumbar back pain with radiculopathy affecting right lower extremity (724 4) (M54 17)   10  Lumbar foraminal stenosis (724 02) (M99 83)   11  Lumbar radiculopathy (724 4) (M54 16)   12  Lumbar spondylosis (721 3) (M47 816)   13  Need for influenza vaccination (V04 81) (Z23)   14  Renal colic on right side (241 3) (N23)   15  TIA (transient ischemic attack) (435 9) (G45 9)   16  Trochanteric bursitis (726 5) (M70 60)    Current Meds   1  Accu-Chek Karen Plus In Vitro Strip; test bid; Therapy: 81IGM6835 to (Last Rx:95Swi0798) Ordered   2  Accu-Chek Softclix Lancets Miscellaneous; test twice daily; Therapy: 09WOZ4645 to (Last Rx:04Wpf2388) Ordered   3  Aspirin-Dipyridamole ER  MG Oral Capsule Extended Release 12 Hour; Take 1   capsule twice daily; Therapy: 11Njt4594 to (03 17 74 30 53)  Requested for: 93Cfx9174; Last   Rx:41Yma9431 Ordered   4  Gabapentin 100 MG Oral Capsule; TAKE 1 CAPSULE BEDTIME MAY INCREASE TO 3   CAPSULES AT BEDTIME AS TOLERATED;    Therapy: 40KTI9870 to (Victor Hugo Guillory)  Requested for: 36SMT8477; Last   Rx:06Oct2017 Ordered   5  Glyxambi 25-5 MG Oral Tablet; one po qd; Therapy: 10UMX9152 to (Evaluate:99Vqi6257)  Requested for: 63VUZ6431; Last   Rx:11Jan2017 Ordered   6  MetFORMIN HCl - 1000 MG Oral Tablet; Take 1 tablet twice daily; Therapy: 36ZVU7174 to (Evaluate:06Jan2018)  Requested for: 65PYW9400; Last   Rx:08Ukj4575 Ordered    Allergies    1   No Known Drug Allergies    Signatures   Electronically signed by : Mee Turner, ; Oct 11 2017  9:07AM EST                       (Author)

## 2018-01-10 NOTE — RESULT NOTES
Verified Results  (1) BASIC METABOLIC PROFILE 74AEX5853 07:34AM Jes Foreman     Test Name Result Flag Reference   SODIUM 137 mmol/L  136-145   POTASSIUM 4 5 mmol/L  3 5-5 3   CHLORIDE 104 mmol/L  100-108   CARBON DIOXIDE 27 mmol/L  21-32   ANION GAP (CALC) 6 mmol/L  4-13   BLOOD UREA NITROGEN 12 mg/dL  5-25   CREATININE 0 73 mg/dL  0 60-1 30   Standardized to IDMS reference method   CALCIUM 9 3 mg/dL  8 3-10 1   eGFR 97 ml/min/1 73sq m     National Kidney Disease Education Program recommendations are as follows:  GFR calculation is accurate only with a steady state creatinine  Chronic Kidney disease less than 60 ml/min/1 73 sq  meters  Kidney failure less than 15 ml/min/1 73 sq  meters  GLUCOSE FASTING 189 mg/dL H 65-99   Specimen collection should occur prior to Sulfasalazine administration due to the potential for falsely depressed results  Specimen collection should occur prior to Sulfapyridine administration due to the potential for falsely elevated results  (1) MICROALBUMIN CREATININE RATIO, RANDOM URINE 23Ngy4528 07:34AM Jes Foreman     Test Name Result Flag Reference   MICROALBUMIN/ CREAT R 19 mg/g creatinine  0-30   MICROALBUMIN,URINE 25 0 mg/L H 0 0-20 0   CREATININE URINE 132 0 mg/dL       (1) HEMOGLOBIN A1C 62Ywo2296 07:34AM Jse Foreman     Test Name Result Flag Reference   HEMOGLOBIN A1C 7 0 % H 4 2-6 3   EST  AVG   GLUCOSE 154 mg/dl

## 2018-01-10 NOTE — MISCELLANEOUS
Message   Recorded as Task   Date: 2017 03:20 PM, Created By: [facility]   Task Name: Care Coordination   Assigned To: Leotha Carrel   Regarding Patient: Maricel Vivas, Status: Active   Comment:    Sandra Marie - 2017 3:20 PM     TASK CREATED  received answering service message:     Reason: Ramon Kenyon   Pt's Dr: Manuel Van       For: Justen Dominguez 47     2nd Call: NO        From: Skylar GUERRERO(DAUGHTER)     Phone: [phone]   Ext:     Pt Name: Emani Hung    Pt : 1950     Message: PT IS IN EXTREME PAIN/CAN DR CALL SOMETHING IN FOR IMMEDIATE            RELIEF OR SHOULD HE COME IN? Nedra Doe - 2017 3:27 PM     TASK REPLIED TO: Previously Assigned To Nedra Doe              please call Dr Naye Clark with this information   Sandra Marie - 2017 3:46 PM     TASK REASSIGNED: Previously Assigned To Sal Jimenez - 2017 4:02 PM     TASK REPLIED TO: Previously Assigned To Trev Fajardo  please schedule pt for followup  I cannot shawn in a steroid pack as it looks like he was just centeno one in earlier this month  If the pain is that severe he should go to ER for eval  NSAIDS are not an option at this time secondary to antiplatelet therapy  What dose of gabapentin is he on? I can increase this for him in the meantime   Sandra Marie - 2017 8:24 AM     TASK REASSIGNED: Previously Assigned To May Chavez - 2017 9:11 AM     TASK REASSIGNED: Previously Assigned To Suki Hurtado - 2017 10:29 AM     TASK EDITED  S/w states that he will hold off making a ov, because he has a scheduled appointment with DR babin  Pt aslo states that he told Dr Juan Pablo Oneil on  at his appt that the gabapentin was not working so he was told to stop it  Made that pt aware that if his pain gets to bad to go to the ER for a eval  Pt verbalized understanding     Dax Rahman - 2017 12:45 PM     TASK REPLIED TO: Previously Assigned To Trev Fajardo  aware        Active Problems    1  Ambulatory dysfunction (719 7) (R26 2)   2  Bilateral leg weakness (729 89) (R29 898)   3  Diabetes mellitus (250 00) (E11 9)   4  Diabetic neuropathy (250 60,357 2) (E11 40)   5  Dyslipidemia (272 4) (E78 5)   6  Exercise counseling (V65 41) (Z71 82)   7  Herniated lumbar intervertebral disc (722 10) (M51 26)   8  Lumbar back pain with radiculopathy affecting right lower extremity (724 4) (M54 17)   9  Lumbar foraminal stenosis (724 02) (M99 83)   10  Lumbar radiculopathy (724 4) (M54 16)   11  Lumbar spondylosis (721 3) (M47 816)    Current Meds   1  Accu-Chek Karen Plus In Vitro Strip; test bid; Therapy: 40AQI9997 to (Last Rx:19May2015) Ordered   2  Accu-Chek Softclix Lancets Miscellaneous; test twice daily; Therapy: 48YYC2617 to (Last Rx:19May2015) Ordered   3  Aspirin-Dipyridamole ER  MG Oral Capsule Extended Release 12 Hour; Take 1   capsule twice daily; Therapy: 02Max5705 to (Adry Pimentel)  Requested for: 85Swq9440; Last   Rx:07Aug2017 Ordered   4  Gabapentin 100 MG Oral Capsule; TAKE 1 CAPSULE BEDTIME MAY INCREASE TO 3   CAPSULES AT BEDTIME AS TOLERATED; Therapy: 76URT2319 to (Berto Middleton)  Requested for: 38LDU3696; Last   Rx:06Oct2017 Ordered   5  Glyxambi 25-5 MG Oral Tablet; one po qd; Therapy: 64FWX2089 to (Evaluate:10Jul2017)  Requested for: 92ICV7472; Last   Rx:11Jan2017 Ordered   6  MetFORMIN HCl - 1000 MG Oral Tablet; Take 1 tablet twice daily; Therapy: 44WHS4679 to (Evaluate:06Jan2018)  Requested for: 13RWY7862; Last   Rx:00Fuu9383 Ordered   7  MethylPREDNISolone 4 MG Oral Tablet Therapy Pack (Medrol); Medrol dose smiley; Therapy: 48WPR2301 to (Last Rx:01Nov2017)  Requested for: 49APS2799 Ordered    Allergies    1   No Known Drug Allergies    Signatures   Electronically signed by : Carroll Craig, ; Nov 8 2017  1:47PM EST                       (Author)

## 2018-01-10 NOTE — RESULT NOTES
Verified Results  (1) RPR 11ZIK0364 07:51AM [facility]     Test Name Result Flag Reference   RPR Non-Reactive  Non-Reactive     (1) LYME ANTIBODY PROFILE W/REFLEX TO WESTERN BLOT 69CPA9023 07:51AM [facility]     Test Name Result Flag Reference   LYME IGG 0 21  0 00-0 79   NEGATIVE(0 00-0 79)-Absence of detectable Borrelia IgG Antibodies  A negative result does not exclude the possibility of Borrelia infection  If early Lyme disease is suspected,a second sample should be collected & tested 4 weeks after initial testing  LYME IGM 0 38  0 00-0 79   NEGATIVE (0 00-0 79)-Absence of detectable Borrelia IgM antibodies  A negative result does not exclude the possibility of Borrelia infection  If early lyme disease is suspected, a second sample should be collected & tested 4 weeks after initial testing

## 2018-01-11 ENCOUNTER — TRANSCRIBE ORDERS (OUTPATIENT)
Dept: ADMINISTRATIVE | Facility: HOSPITAL | Age: 68
End: 2018-01-11

## 2018-01-11 ENCOUNTER — ALLSCRIPTS OFFICE VISIT (OUTPATIENT)
Dept: OTHER | Facility: OTHER | Age: 68
End: 2018-01-11

## 2018-01-11 DIAGNOSIS — G62.89 DISEASE RELATED PERIPHERAL NEUROPATHY: Primary | ICD-10-CM

## 2018-01-11 NOTE — RESULT NOTES
Verified Results  ECHO STRESS TEST W CONTRAST IF INDICATED [id number] 08:15AM Deny Castillo Order Number: [id number]    - Patient Instructions: To schedule this appointment, please contact Central Scheduling at [phone]  Test Name Result Flag Reference   ECHO STRESS TEST W CONTRAST IF INDICATED (Report)     [address] [facility] [address] [facility] [place] [address]   [phone]     Exercise Stress Echocardiography     Study date: 2017     Patient: Isidro Nyhan   MR number: [id number]   Account number: [de-identified]   : 1950   Age: 77 years   Gender: Male   Study date: 2017   Status: Outpatient   Location: Stress lab   Height: 72 in   Weight: 175 lb   BP: 122// 80 mmHg     Indications: Detection of coronary artery disease  Diagnosis: 786 50 - CHEST PAIN NOS     Sonographer: Mainor Sanchez [facility], CCT   Primary Physician: Ally Madrigal DO   Interpreting Physician: Montez Snyder DO   Referring Physician: Ally Madrigal DO   Group: [facility]   RN: Vipul Mims RN     IMPRESSIONS:   Normal study after maximal exercise  SUMMARY     STRESS RESULTS:   Duration of exercise was 8 min  Maximal work rate was 10 1 METs  Maximal heart rate during stress was 150 bpm ( 97 % of maximal predicted heart rate)  Target heart rate was achieved  There was no chest pain during stress  ECG CONCLUSIONS:   The stress ECG was negative for ischemia  BASELINE:   There were no regional wall motion abnormalities  Estimated left ventricular ejection fraction was 60 %   PEAK STRESS:   There were no regional wall motion abnormalities  ECHO CONCLUSIONS:   There was no echocardiographic evidence for stress-induced ischemia  HISTORY: The patient is a 77year old  male  Chest pain status: chest pain  Coronary artery disease risk factors: smoking and family history of coronary artery disease       REST ECG: Normal sinus rhythm  Nonspecific ST and T wave abnormalities were present  PROCEDURE: The study was performed in the stress lab  Treadmill exercise testing was performed, using the Nick protocol  Stress and rest echocardiographic evaluation with 2D imaging was performed from multiple acoustic windows for   evaluation of ventricular function  NICK PROTOCOL:   HR bpm SBP mmHg DBP mmHg Symptoms   Baseline 88 122 80 none   Stage 1 122 160 80 --   Stage 2 137 166 82 --   Recovery 2 105 150 84 --   Recovery 5 102 166 82 --     STRESS RESULTS: Duration of exercise was 8 min  The patient exercised to protocol stage 3  Maximal work rate was 10 1 METs  Functional capacity was normal  Maximal heart rate during stress was 150 bpm ( 97 % of maximal predicted heart   rate)  Target heart rate was achieved  The heart rate response to stress was normal  Maximal systolic blood pressure during stress was 166 mmHg  There was normal resting blood pressure with an appropriate response to stress  The   rate-pressure product for the peak heart rate and blood pressure was 12602  There was no chest pain during stress  The stress test was terminated due to moderate dyspnea  ECG CONCLUSIONS: The stress ECG was negative for ischemia  Arrhythmia during stress: isolated premature ventricular beats and pairs of premature ventricular beats  STRESS 2D ECHO RESULTS:     BASELINE: There were no regional wall motion abnormalities  Left ventricular size was normal  Overall left ventricular systolic function was normal  Estimated left ventricular ejection fraction was 60 %   PEAK STRESS: There were no regional wall motion abnormalities  There was an appropriate reduction in left ventricular size  There was an appropriate augmentation in LV function  ECHO CONCLUSIONS: There was no echocardiographic evidence for stress-induced ischemia       Prepared and electronically signed by     Conchis John DO   Signed 18-Jan-2017 17:37:55

## 2018-01-11 NOTE — RESULT NOTES
Verified Results  ECG 12-LEAD 16Jan2017 09:18AM Alex Distad     Test Name Result Flag Reference   ECG 12-LEAD      Normal sinus rhythm   Low voltage QRS   Borderline ECG   No previous ECGs available   Confirmed by Bud  ([id number]) on 1/17/2017 12:11:49 PM

## 2018-01-11 NOTE — MISCELLANEOUS
October 9, 2017        To whom it may concern,         Please excuse patient from jury duty due to he is unable to sit for extended periods of time due to his orthopedic  condition          Sincerely,         MARCEL Root

## 2018-01-12 VITALS
HEART RATE: 94 BPM | BODY MASS INDEX: 21.29 KG/M2 | DIASTOLIC BLOOD PRESSURE: 73 MMHG | SYSTOLIC BLOOD PRESSURE: 107 MMHG | WEIGHT: 157 LBS

## 2018-01-12 VITALS
WEIGHT: 156.5 LBS | HEIGHT: 72 IN | SYSTOLIC BLOOD PRESSURE: 122 MMHG | DIASTOLIC BLOOD PRESSURE: 72 MMHG | BODY MASS INDEX: 21.2 KG/M2 | TEMPERATURE: 97.7 F

## 2018-01-12 VITALS
DIASTOLIC BLOOD PRESSURE: 84 MMHG | SYSTOLIC BLOOD PRESSURE: 132 MMHG | HEIGHT: 72 IN | BODY MASS INDEX: 23.3 KG/M2 | WEIGHT: 172 LBS

## 2018-01-12 VITALS
SYSTOLIC BLOOD PRESSURE: 120 MMHG | DIASTOLIC BLOOD PRESSURE: 64 MMHG | WEIGHT: 152.6 LBS | HEIGHT: 72 IN | BODY MASS INDEX: 20.67 KG/M2

## 2018-01-12 NOTE — CONSULTS
Assessment   1  Ambulatory dysfunction (719 7) (R26 2)   2  Bilateral leg weakness (729 89) (R29 898)   3  Disease related peripheral neuropathy (356 8) (G62 89)   4  Neuropathy, peripheral, idiopathic (356 9) (G60 9)    Plan   Neuropathy, peripheral, idiopathic    · (1) ACETYLCHOLINE RECEPTOR ANTIBODY; Status:Active - Retrospective Authorization; Requested SILAS:[id number]; Perform:[facility]; VIX:[id number]; Last Updated By:Fady Narayanan; 1/11/2018 11:38:23 AM;Ordered;For:Neuropathy, peripheral, idiopathic; Ordered By:Kiya Bueno;   · (1) AMINO ACID SCREEN QUANTITATIVE, URINE; Status:Active - Retrospective    Authorization; Requested RXI:[id number]; Perform:[facility]; EPK:[id number]; Last Updated By:Fady Narayanan; 1/11/2018 11:38:23 AM;Ordered;For:Neuropathy, peripheral, idiopathic; Ordered By:Kiya Bueno;   · (1) C3 COMPLEMENT; Status:Active - Retrospective Authorization; Requested    DGE:[id number]; Perform:[facility]; PML:[id number]; Last Updated By:Fady Narayanan; 1/11/2018 11:38:23 AM;Ordered;For:Neuropathy, peripheral, idiopathic; Ordered By:Kiya Bueno;   · (1) SODIUM, URINE 24HR; Status:Active - Retrospective Authorization; Requested    QOL:[id number]; Perform:[facility]; TQJ:[id number]; Last Updated By:Fady Narayanan; 1/11/2018 11:38:23 AM;Ordered;For:Neuropathy, peripheral, idiopathic; Ordered By:Kiya Bueno;   · (Q) PROTEIN ELECTROPHORESIS AND KAPPA/LAMBDA LIGHT CHAINS, SERUM;    Status:Active - Retrospective Authorization; Requested GEORGE:[id number]; Perform:[facility]; QAJ:[id number]; Last Updated By:Fady Narayanan; 1/11/2018 11:38:23 AM;Ordered;For:Neuropathy, peripheral, idiopathic; Ordered By:Kiya Bueno;   · (Q) THYROID PEROXIDASE ANTIBODIES; Status:Active - Retrospective Authorization; Requested PGE:[id number];     Perform:[facility]; VMJ:[id number]; Last Updated By:Fady Narayanan; 1/11/2018 11:38:23 AM;Ordered;For:Neuropathy, peripheral, idiopathic; Ordered By:Kiya Bueno;   · Spinal puncture, lumbar, diagnostic; Status:Active; Requested NAN:[id number]; Perform:[facility]; SOBEIDA:[id number]; Last Updated By:Savage Henson; 1/11/2018 11:52:13 AM;Ordered;For:Neuropathy, peripheral, idiopathic; Ordered By:Kiya Bueno;   · *1 - [facility]  *with Dr Piotr Roque at next available visit  Status:    Active  Requested for: 61THC3759   Ordered; For: Neuropathy, peripheral, idiopathic; Ordered By: Roel Rangel Performed:  Due: 00IUN6127; Last Updated By: Deborah Calabrese; 1/11/2018 11:52:13 AM  Care Summary provided  : Yes   · *1 - [facility] [address], [place] [facility]  *  Status: Active     Requested for: 61KLR6357   Ordered; For: Neuropathy, peripheral, idiopathic; Ordered By: Roel Rangel Performed:  Due: 33MIN3610; Last Updated By: Deborah Calabrese; 1/11/2018 11:52:13 AM  Care Summary provided  : Yes    Discussion/Summary   Discussion Summary: This is a 79year old  male with history of TIA for Aggrenox, Diabetes who is here as a new patient for gait dysfunction  He has lost 50 lbs in the past 18 months  He is seeing urology and Pulmonology in the next two months beause he was found to have pulmonary nodule and he also has a nodule in the adrenal gland  He has been having difficulty in lower extremities, and is having falls quite frequently about once or twice every 2-3 weeks  MRI spine shows multi-level spondylosis and no evidence of cord compression  MRI L-spine shows L5-S1 radiculopathy  EMG showed generalized peripheral neuropathy especially axonal, and radiculopathy at L5-S1  Neurologic examination in the office today is no cranial nerve deficits noted, motor strength normal bilateral upper extremities, and weakness noted in bilateral lower extremities but left worse than the right, decreased sensation to soft touch, pinprick in his feet bilaterally and he had some color changes in his feet bilaterally   He does have findings of peripheral neuropathy on examination but he seems to be saying his knees give out, and that he falls out, which does not quite fit  I am concerned about paraneoplastic vs autoimmune process although EMG states that is axonal peripheral neuropathy  of TIA: Continue with Aggrenox last HbA1c 8 1, continue with metformin, and recommend euglycemic control, defer to PCP for management of Diabetes  will get Lumbar puncture - and send for paraneoplastic panel, and autoimmune panel  follow up with Dr Ainsley Saavedra at the next available visit  Chief Complaint   Chief Complaint Free Text Note Form: Patient present for consultation regarding ambulatory dysfunction      History of Present Illness   HPI: This is a 79year old  male who is here for leg weakness, and back pain previously  He says it all started right after labor day in 2017  He fell down the stairs one day and was not sure why  Then he saw a few specialists and has had workup  He had MRI L/S spine which showed no evidence of cord compression but showed L5-S1 radiculopathy and EMG showed axonal polyneuropathy  has right leg weakness since then he states, and right leg gives out at random times and foot crawls up and he could be walking with his walker and he could just fall suddenly  He cannot stand for long periods without falling down  He was fairly independent and was driving prior to all this but now he cannot do much he states  He does not have any issues with his hands he says  He does not have any tingling/numbness in his hands he states  He got steroid injections in his back but did not help with his back pain and does not have any back pain  He saw spine specialist and MRI Spine which shows cervical spondylosis however no cord compression, he wants him to be evaluated by neurology and he is here  He has been getting PT at home for a few months      fell down again in December 28th and he did not get physical therapy that day and has not gone since and wanted to be evaluated by neurologist first  He has blood work which consisted for B12, b6, Zinc, ESR and other blood work, which were all normal  He is on Gabapentin 300mg PO TID and he states it seems to help him with some of his tingling/numbness in his feet  His last A1c is 8 2 and is currently working on controlling his blood sugar  He uses a cane to get around  Review of Systems   Neurological ROS:      Constitutional: recent weight loss,-- fatigue-- and-- appetite changes  HEENT:  no sinus problems, not feeling congested, no blurred vision, no dryness of the eyes, no eye pain, no hearing loss, no tinnitus, no mouth sores, no sore throat, no hoarseness, no dysphagia, no masses, no bleeding  Cardiovascular: chest pain or pressure  Respiratory:  no unusual or persistant cough, no shortness of breath with or without exertion  Gastrointestinal:  no nausea, no vomiting, no diarrhea, no abdominal pain, no changes in bowel habits, no melena, no loss of bowel control  Genitourinary:  no incontinence, no feelings of urinary urgency, no increase in frequency, no urinary hesitancy, no dysuria, no hematuria  Musculoskeletal: arthralgias,-- myalgias,-- immobility or loss of function-- and-- pain while walking  Integumentary  no masses, no rash, no skin lesions, no livedo reticularis  Psychiatric: depression  Endocrine  no unusual weight loss or gain, no excessive urination, no excessive thirst, no hair loss or gain, no hot or cold intolerance, no menstrual period change or irregularity, no loss of sexual ability or drive, no erection difficulty, no nipple discharge  Hematologic/Lymphatic:  no unusual bleeding, no tendency for easy bruising, no clotting skin or lumps        Neurological General:  no headache, no nausea or vomiting, no lightheadedness, no convulsions, no blackouts, no syncope, no trauma, no photopsia, no increased sleepiness, no trouble falling asleep, no snoring, no awakening at night  Neurological Mental Status:  no confusion, no mood swings, no alteration or loss of consciousness, no difficulty expressing/understanding speech, no memory problems  Neurological Cranial Nerves:  no blurry or double vision, no loss of vision, no face drooping, no facial numbness or weakness, no taste or smell loss/changes, no hearing loss or ringing, no vertigo or dizziness, no dysphagia, no slurred speech  Neurological Motor findings include:  no tremor, no twitching, no cramping(pre/post exercise), no atrophy  Neurological Coordination: balance difficulties  Neurological Sensory: numbness  Neurological Gait: difficulty walking-- and-- has had falls  ROS Reviewed:    ROS reviewed  Active Problems   1  Ambulatory dysfunction (719 7) (R26 2)   2  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,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,G63)   3  Bilateral leg weakness (729 89) (R29 898)   4  Diabetes mellitus (250 00) (E11 9)   5  Diabetic neuropathy (250 60,357 2) (E11 40)   6  Disease related peripheral neuropathy (356 8) (G62 89)   7  Dyslipidemia (272 4) (E78 5)   8  Enlarged lymph node (785 6) (R59 9)   9  Exercise counseling (V65 41) (Z71 82)   10  Herniated lumbar intervertebral disc (722 10) (M51 26)   11  Lumbar back pain with radiculopathy affecting right lower extremity (724 4) (M54 17)   12  Lumbar foraminal stenosis (724 02) (M99 83)   13  Lumbar radiculopathy (724 4) (M54 16)   14  Lumbar spondylosis (721 3) (M47 816)   15  Mild vitamin D deficiency (268 9) (E55 9)   16  Neuropathy, peripheral, idiopathic (356 9) (G60 9)   17  Pulmonary nodule seen on imaging study (793 11) (R91 1)   18  Transient cerebral ischemia, unspecified type (435 9) (G45 9)    Past Medical History   1  History of Abnormal CT of the abdomen (793 6) (R93 5)   2  History of Atypical chest pain (786 59) (R07 89)   3  History of Bilateral hip pain (719 45) (M25 551,M25 552)   4   History of Depression screening (V79 0) (Z13 89) 5  History of Hamstring muscle strain, left, initial encounter (843 8) (Z01 135C)   6  History of abdominal pain (V13 89) (Z87 898)   7  History of iron deficiency anemia (V12 3) (Z86 2)   8  History of pneumococcal vaccination (V49 89) (Z92 29)   9  History of Medicare annual wellness visit, subsequent (V70 0) (Z00 00)   10  History of Need for hepatitis C screening test (V73 89) (Z11 59)   11  History of Renal colic on right side (402 2) (N23)   12  History of Screening for diabetic peripheral neuropathy (V80 09) (Z13 89)   13  History of Screening for genitourinary condition (V81 6) (Z13 89)   14  History of Screening for neurological condition (V80 09) (Z13 89)   15  History of Special screening examination for neoplasm of prostate (V76 44) (Z12 5)   16  History of Trochanteric bursitis (726 5) (M70 60)   17  History of Visit for preventive health examination (V70 0) (Z00 00)  Active Problems And Past Medical History Reviewed: The active problems and past medical history were reviewed and updated today  Surgical History   1  History Of Prior Surgery  Surgical History Reviewed: The surgical history was reviewed and updated today  Family History   Family History    1  No pertinent family history  Family History Reviewed: The family history was reviewed and updated today  Social History    · Cigar smoker (305 1) (F17 290)   · Former smoker (V15 82) (Y98 592)   · No advance directives (V49 89) (Z78 9)   · History of No advance directives (V49 89) (Z78 9)   · No living will   · Social alcohol use (Z78 9)  Social History Reviewed: The social history was reviewed and updated today  The social history was reviewed and is unchanged  Current Meds    1  Accu-Chek Karen Plus In Vitro Strip; test bid; Therapy: 98RPW1885 to (Last Rx:49Syk9679) Ordered   2  Accu-Chek Softclix Lancets Miscellaneous; test twice daily; Therapy: 92FJD3352 to (Last Rx:58Bfs4330) Ordered   3  Aspirin-Dipyridamole ER  MG Oral Capsule Extended Release 12 Hour; Take 1     capsule twice daily; Therapy: 51Rhu9169 to (Evaluate:85Eod8297)  Requested for: 19YUU1536; Last     Rx:13Nov2017 Ordered   4  Gabapentin 300 MG Oral Capsule; TAKE 1 CAPSULE 3 TIMES DAILY; Therapy: 33SVH3671 to (Evaluate:75Isr8907)  Requested for: 26Nyz4384; Last     Rx:04Mhs8327 Ordered   5  Glyxambi 25-5 MG Oral Tablet; one po qd; Therapy: 07DIU1110 to (Evaluate:53Cxv0813)  Requested for: 30DFN3047; Last     Rx:11Jan2017 Ordered   6  MetFORMIN HCl - 1000 MG Oral Tablet; Take 1 tablet twice daily; Therapy: 49ASY5635 to (Evaluate:06Jan2018)  Requested for: 36GWW7612; Last     Rx:43Wgd2891 Ordered   7  OneTouch Ultra Mini w/Device Kit; USE AS DIRECTED  Dx 250 E11 9 NIDD     Including, Battery, control solution, and lancing device; Therapy: 64WOS9359 to (Last Rx:28Nov2017)  Requested for: 28Nov2017 Ordered   8  Tylenol 325 MG Oral Capsule; TAKE 2 CAPSULE Twice daily; Therapy: (Recorded:28Nov2017) to Recorded   9  Vitamin D (Ergocalciferol) 84047 UNIT Oral Capsule; TAKE 1 CAPSULE WEEKLY for 12     weeks; Therapy: 09DDT2252 to (Last Rx:30Nov2017)  Requested for: 07CJU6718 Ordered  Medication List Reviewed: The medication list was reviewed and updated today  Allergies   1  No Known Drug Allergies    Vitals   Signs   Recorded: 27XNB2530 10:38AM   Heart Rate: 87  Respiration: 16  Systolic: 936  Diastolic: 76  Height: 6 ft   Weight: 152 lb   BMI Calculated: 20 61  BSA Calculated: 1 9    Physical Exam   General: Patient is not in any acute distress  HEENT: normocephalic, atraumatic  Neck: supple     Heart: regular heart rate and rhythm, no murmurs, rubs and or gallops  Chest: Clear to auscultation bilaterally     Abdomen: soft and non-tender  Skin: no lesions     Neurologic Examination:      Mental status: alert, awake, and following commands        Speech: Speech is fluent without any dysarthria, no aphasia noted     Cranial Nerves: Pupils equal round reactive to light and extraocular movements intact  Visual fields full to confrontation  Fundus exam - normal, no papilledema noted  Facial sensation intact to soft touch in V1, V2 and V3 distributions  Face symmetric  Tongue midline, able to move side to side  Palate elevation symmetric, uvula midline, no deviation noted  Shoulder shrug strong  Motor: Strength -  5/5 Bilateral UE, RLE 4-/5, weak left hip flexor 4-/5 an No drift  Normal rapid alternating movements  Normal tone, color changes noted on the toes  Sensory: sensation decreased to soft touch, pinprick just in his feet bilaterally, but vibration/proprioception and temperature intact throughout  Cerebellar: Finger-to-nose intact, normal heel to shin  Reflexes: 2+ in all 4 extremities, downgoing toes bilaterally     Gait: uses a cane to walk, has an entalgic gait  Future Appointments      Date/Time Provider Specialty Site   02/19/2018 02:40 PM LEVI Mathis  Pulmonary Medicine [facility] [place]   01/22/2018 09:00 AM LEVI Crane   Urology [facility] [place]     Signatures    Electronically signed by : Billy Bentley MD; Jan 11 2018 12:09PM EST                       (Author)

## 2018-01-13 VITALS
BODY MASS INDEX: 23.7 KG/M2 | WEIGHT: 175 LBS | HEIGHT: 72 IN | SYSTOLIC BLOOD PRESSURE: 120 MMHG | DIASTOLIC BLOOD PRESSURE: 70 MMHG

## 2018-01-13 NOTE — RESULT NOTES
Verified Results  * XR CHEST PA & LATERAL 64YTP0001 09:15AM Lex Tapia Order Number: [id number]     Test Name Result Flag Reference   XR CHEST PA & LATERAL (Report)     CHEST     INDICATION: Chest pain     COMPARISON: None     VIEWS: PA and lateral; 2 images     FINDINGS:     The cardiomediastinal silhouette is unremarkable  The lungs are clear  No pleural effusions  Old healed right rib fractures  IMPRESSION:     No active pulmonary disease          Workstation performed: [id number]     Signed by:   Dominique Goetz MD   1/16/17

## 2018-01-14 VITALS
BODY MASS INDEX: 22.08 KG/M2 | DIASTOLIC BLOOD PRESSURE: 65 MMHG | HEIGHT: 72 IN | SYSTOLIC BLOOD PRESSURE: 138 MMHG | WEIGHT: 163 LBS

## 2018-01-15 VITALS
BODY MASS INDEX: 20.93 KG/M2 | DIASTOLIC BLOOD PRESSURE: 78 MMHG | WEIGHT: 154.5 LBS | SYSTOLIC BLOOD PRESSURE: 120 MMHG | HEIGHT: 72 IN | HEART RATE: 99 BPM

## 2018-01-15 VITALS
WEIGHT: 154.5 LBS | DIASTOLIC BLOOD PRESSURE: 72 MMHG | BODY MASS INDEX: 20.93 KG/M2 | HEIGHT: 72 IN | SYSTOLIC BLOOD PRESSURE: 122 MMHG

## 2018-01-15 NOTE — RESULT NOTES
Verified Results  (1) COMPREHENSIVE METABOLIC PANEL 15GPH3388 25:93DO Arlyn Connors     Test Name Result Flag Reference   GLUCOSE,RANDM 130 mg/dL     If the patient is fasting, the ADA then defines impaired fasting glucose as > 100 mg/dL and diabetes as > or equal to 123 mg/dL  SODIUM 141 mmol/L  136-145   POTASSIUM 4 0 mmol/L  3 5-5 3   CHLORIDE 104 mmol/L  100-108   CARBON DIOXIDE 29 mmol/L  21-32   ANION GAP (CALC) 8 mmol/L  4-13   BLOOD UREA NITROGEN 11 mg/dL  5-25   CREATININE 0 64 mg/dL  0 60-1 30   Standardized to IDMS reference method   CALCIUM 8 7 mg/dL  8 3-10 1   BILI, TOTAL 0 46 mg/dL  0 20-1 00   ALK PHOSPHATAS 53 U/L     ALT (SGPT) 17 U/L  12-78   AST(SGOT) 6 U/L  5-45   ALBUMIN 3 8 g/dL  3 5-5 0   TOTAL PROTEIN 7 3 g/dL  6 4-8 2   eGFR Non-African American      >60 0 ml/min/1 73sq [facility] Disease Education Program recommendations are as follows:  GFR calculation is accurate only with a steady state creatinine  Chronic Kidney disease less than 60 ml/min/1 73 sq  meters  Kidney failure less than 15 ml/min/1 73 sq  meters

## 2018-01-15 NOTE — RESULT NOTES
Verified Results  (1) HEMOGLOBIN A1C [id number] 07:22AM [facility] Order Number: [id number]     Test Name Result Flag Reference   HEMOGLOBIN A1C 7 2 % H 4 2-6 3   EST  AVG  GLUCOSE 160 mg/dl       (1) COMPREHENSIVE METABOLIC PANEL 65MPR9180 03:06XZ [facility] Order Number: [id number]     Test Name Result Flag Reference   GLUCOSE,RANDM 139 mg/dL     If the patient is fasting, the ADA then defines impaired fasting glucose as > 100 mg/dL and diabetes as > or equal to 123 mg/dL  SODIUM 139 mmol/L  136-145   POTASSIUM 4 3 mmol/L  3 5-5 3   CHLORIDE 103 mmol/L  100-108   CARBON DIOXIDE 28 mmol/L  21-32   ANION GAP (CALC) 8 mmol/L  4-13   BLOOD UREA NITROGEN 13 mg/dL  5-25   CREATININE 0 84 mg/dL  0 60-1 30   Standardized to IDMS reference method   CALCIUM 8 9 mg/dL  8 3-10 1   BILI, TOTAL 0 79 mg/dL  0 20-1 00   ALK PHOSPHATAS 56 U/L     ALT (SGPT) 22 U/L  12-78   AST(SGOT) 7 U/L  5-45   ALBUMIN 4 1 g/dL  3 5-5 0   TOTAL PROTEIN 7 3 g/dL  6 4-8 2   eGFR Non-African American      >60 0 ml/min/1 73sq [facility] Disease Education Program recommendations are as follows:  GFR calculation is accurate only with a steady state creatinine  Chronic Kidney disease less than 60 ml/min/1 73 sq  meters  Kidney failure less than 15 ml/min/1 73 sq  meters       (1) CBC/PLT/DIFF [id number] 07:22AM [facility] Order Number: [id number]     Test Name Result Flag Reference   WBC COUNT 9 83 Thousand/uL  4 31-10 16   RBC COUNT 5 33 Million/uL  3 88-5 62   HEMOGLOBIN 16 7 g/dL  12 0-17 0   HEMATOCRIT 50 2 % H 36 5-49 3   MCV 94 fL  82-98   MCH 31 3 pg  26 8-34 3   MCHC 33 3 g/dL  31 4-37 4   RDW 13 7 %  11 6-15 1   MPV 10 6 fL  8 9-12 7   PLATELET COUNT 043 Thousands/uL  149-390   nRBC AUTOMATED 0 /100 WBCs     NEUTROPHILS RELATIVE PERCENT 73 %  43-75   LYMPHOCYTES RELATIVE PERCENT 19 %  14-44   MONOCYTES RELATIVE PERCENT 7 %  4-12   EOSINOPHILS RELATIVE PERCENT 1 %  0-6 BASOPHILS RELATIVE PERCENT 0 %  0-1   NEUTROPHILS ABSOLUTE COUNT 7 18 Thousands/?L  1 85-7 62   LYMPHOCYTES ABSOLUTE COUNT 1 84 Thousands/?L  0 60-4 47   MONOCYTES ABSOLUTE COUNT 0 71 Thousand/?L  0 17-1 22   EOSINOPHILS ABSOLUTE COUNT 0 06 Thousand/?L  0 00-0 61   BASOPHILS ABSOLUTE COUNT 0 01 Thousands/?L  0 00-0 10   - Patient Instructions: This bloodwork is non-fasting  Please drink two glasses of water morning of bloodwork  - Patient Instructions: This bloodwork is non-fasting  Please drink two glasses of water morning of bloodwork  (1) MICROALBUMIN CREATININE RATIO, RANDOM URINE [id number] 07:22AM [facility]: [id number]     Test Name Result Flag Reference   MICROALBUMIN/ CREAT R 7 mg/g creatinine  0-30   MICROALBUMIN,URINE 14 7 mg/L  0 0-20 0   CREATININE URINE 222 0 mg/dL       (1) PSA (SCREEN) (Dx V76 44 Screen for Prostate Cancer) 11Eaa1168 07:22AM Chad Calabrese Order Number: [id number]     Test Name Result Flag Reference   PROSTATE SPECIFIC ANTIGEN 0 7 ng/mL  0 0-4 0   - Patient Instructions: This test is non-fasting  Please drink two glasses of water morning of bloodwork  - Patient Instructions: This test is non-fasting  Please drink two glasses of water morning of bloodwork

## 2018-01-16 NOTE — MISCELLANEOUS
Message   Recorded as Task   Date: 03/16/2017 11:41 AM, Created By: Ayanna Arevalo   Task Name: Care Coordination   Assigned To: Jacinto Gaming   Regarding Patient: Kath Prince, Status: Active   Comment:    Natasha Mora - 16 Mar 2017 11:41 AM     TASK CREATED  Pt does not want to re schedule at this time  He will call if needed   Chani Richardson - 16 Mar 2017 12:41 PM     TASK REASSIGNED: Previously Assigned To Chani Richardson  Patient not interested in being seen anymore- does not see the need  Would you like to dictate a letter? Honor Kenneth - 27 Mar 2017 4:07 PM     TASK REPLIED TO: Previously Assigned To Honor [place]  no, it was a soft call anyway        Active Problems    1  Abdominal pain (789 00) (R10 9)   2  Atypical chest pain (786 59) (R07 89)   3  Depression screening (V79 0) (Z13 89)   4  Diabetes mellitus (250 00) (E11 9)   5  Diabetic neuropathy (250 60,357 2) (E11 40)   6  Dyslipidemia (272 4) (E78 5)   7  Exercise counseling (V65 41) (Z71 89)   8  Hamstring muscle strain, left, initial encounter (843 8) (S76 312A)   9  Iron deficiency anemia (280 9) (D50 9)   10  Medicare annual wellness visit, subsequent (V70 0) (Z00 00)   11  Need for hepatitis C screening test (V73 89) (Z11 59)   12  Need for influenza vaccination (V04 81) (Z23)   13  Need for pneumococcal vaccine (V03 82) (Z23)   14  Renal colic on right side (245 2) (N23)   15  Screening for diabetic peripheral neuropathy (V80 09) (Z13 89)   16  Screening for genitourinary condition (V81 6) (Z13 89)   17  Screening for neurological condition (V80 09) (Z13 89)   18  TIA (transient ischemic attack) (435 9) (G45 9)   19  Trochanteric bursitis (726 5) (M70 60)    Current Meds   1  Accu-Chek Karen Plus In Vitro Strip; test bid; Therapy: 19QKR2552 to (Last Rx:73Jnb2568) Ordered   2  Accu-Chek Softclix Lancets Miscellaneous; test twice daily; Therapy: 68ZTV5576 to (Last Rx:90Wgx0139) Ordered   3   Aggrenox  MG Oral Capsule Extended Release 12 Hour (Aspirin-Dipyridamole   ER); Take 1 capsule twice daily  Requested for: 46DED8487; Last Rx:11Jan2017   Ordered   4  Atorvastatin Calcium 10 MG Oral Tablet; TAKE 1 TABLET DAILY; Therapy: 96WYP0708 to (Evaluate:97Ouh1261)  Requested for: 02BRM9551; Last   Rx:54Ejk0420 Ordered   5  Glyxambi 25-5 MG Oral Tablet; one po qd; Therapy: 88NJE2394 to (Evaluate:38Eig1921)  Requested for: 14RJO5376; Last   Rx:11Jan2017 Ordered   6  Lisinopril 2 5 MG Oral Tablet; TAKE 1 TABLET DAILY; Therapy: 36YVQ7776 to (Complete:13Jun2017)  Requested for: 14AZX9245; Last   Rx:13Feb2017 Ordered   7  MetFORMIN HCl - 1000 MG Oral Tablet; Take 1 tablet twice daily  Requested for:   30EXI8994; Last Rx:11Jan2017 Ordered    Allergies    1   No Known Drug Allergies    Signatures   Electronically signed by : Dimitry John, ; Mar 28 2017  8:19AM EST                       (Author)

## 2018-01-17 NOTE — RESULT NOTES
Verified Results  XR HIPS BILATERAL 3-4 VS W PELVIS IF PERFORMED 28Lyx6815 09:49AM [facility] Order Number: [id number]     Test Name Result Flag Reference   XR HIPS BILATERAL WITH AP PELVIS 3-4 VW (Report)     BILATERAL HIPS AND PELVIS     INDICATION: Right hip pain  Difficult walking  COMPARISON: None     VIEWS: AP pelvis and coned down views of each hip     IMAGES: 5      FINDINGS:     No acute pelvic fracture or pathologic bone lesions  Visualized bony pelvis appears intact  LEFT HIP:   Moderate left hip joint space narrowing with osteophyte formation compatible with osteoarthritis  Bony alignment is maintained  Soft tissues are unremarkable  RIGHT HIP:   Mild right hip joint space narrowing with osteophyte formation suggestive of osteoarthritis  Bony alignment is maintained  Soft tissues are unremarkable  IMPRESSION:     Bilateral hip joint osteoarthritis left greater than right         Workstation performed: [id number]     Signed by:   Stephie Briones DO   8/28/17

## 2018-01-18 NOTE — RESULT NOTES
Verified Results  (1) VITAMIN D 25-HYDROXY 39YSD8402 07:51AM Gabino Lo     Test Name Result Flag Reference   VIT D 25-HYDROX 13 6 ng/mL L 30 0-100 0   This assay is a certified procedure of the CDC Vitamin D Standardization Certification Program (VDSCP)     Deficiency <20ng/ml   Insufficiency 20-30ng/ml   Sufficient  ng/ml     *Patients undergoing fluorescein dye angiography may retain small amounts of fluorescein in the body for 48-72 hours post procedure  Samples containing fluorescein can produce falsely elevated Vitamin D values  If the patient had this procedure, a specimen should be resubmitted post fluorescein clearance  (1) CBC/PLT/DIFF [id number] 07:51AM Gabino Lo     Test Name Result Flag Reference   WBC COUNT 8 97 Thousand/uL  4 31-10 16   RBC COUNT 4 71 Million/uL  3 88-5 62   HEMOGLOBIN 15 0 g/dL  12 0-17 0   HEMATOCRIT 43 2 %  36 5-49 3   MCV 92 fL  82-98   MCH 31 8 pg  26 8-34 3   MCHC 34 7 g/dL  31 4-37 4   RDW 13 2 %  11 6-15 1   MPV 10 2 fL  8 9-12 7   PLATELET COUNT 905 Thousands/uL  149-390   nRBC AUTOMATED 0 /100 WBCs     NEUTROPHILS RELATIVE PERCENT 65 %  43-75   LYMPHOCYTES RELATIVE PERCENT 26 %  14-44   MONOCYTES RELATIVE PERCENT 8 %  4-12   EOSINOPHILS RELATIVE PERCENT 1 %  0-6   BASOPHILS RELATIVE PERCENT 0 %  0-1   NEUTROPHILS ABSOLUTE COUNT 5 91 Thousands/? ??L  1 85-7 62   LYMPHOCYTES ABSOLUTE COUNT 2 30 Thousands/? ??L  0 60-4 47   MONOCYTES ABSOLUTE COUNT 0 68 Thousand/? ??L  0 17-1 22   EOSINOPHILS ABSOLUTE COUNT 0 05 Thousand/? ??L  0 00-0 61   BASOPHILS ABSOLUTE COUNT 0 01 Thousands/? ??L  0 00-0 10   This is a patient instruction: This test is non-fasting  Please drink two glasses of water morning of bloodwork       (1) SED RATE 98FRM4061 07:51AM Gabino Lo     Test Name Result Flag Reference   SED RATE 6 mm/hour  0-10     (1) COMPREHENSIVE METABOLIC PANEL 95ACV8613 13:50LJ Gabino Pardede     Test Name Result Flag Reference   SODIUM 139 mmol/L  136-145   POTASSIUM 3 6 mmol/L  3 5-5 3   CHLORIDE 104 mmol/L  100-108   CARBON DIOXIDE 28 mmol/L  21-32   ANION GAP (CALC) 7 mmol/L  4-13   BLOOD UREA NITROGEN 15 mg/dL  5-25   CREATININE 0 71 mg/dL  0 60-1 30   Standardized to IDMS reference method   CALCIUM 9 1 mg/dL  8 3-10 1   BILI, TOTAL 0 58 mg/dL  0 20-1 00   ALK PHOSPHATAS 46 U/L     ALT (SGPT) 16 U/L  12-78   Specimen collection should occur prior to Sulfasalazine and/or Sulfapyridine administration due to the potential for falsely depressed results  AST(SGOT) 8 U/L  5-45   Specimen collection should occur prior to Sulfasalazine administration due to the potential for falsely depressed results  ALBUMIN 3 8 g/dL  3 5-5 0   TOTAL PROTEIN 7 1 g/dL  6 4-8 2   eGFR 97 ml/min/1 73sq m     This is a patient instruction: Patient fasting for 8 hours or longer recommended  National Kidney Disease Education Program recommendations are as follows:  GFR calculation is accurate only with a steady state creatinine  Chronic Kidney disease less than 60 ml/min/1 73 sq  meters  Kidney failure less than 15 ml/min/1 73 sq  meters  GLUCOSE FASTING 164 mg/dL H 65-99   Specimen collection should occur prior to Sulfasalazine administration due to the potential for falsely depressed results  Specimen collection should occur prior to Sulfapyridine administration due to the potential for falsely elevated results  (1) VITAMIN B12 13PJK5811 07:51AM Vida Godoy     Test Name Result Flag Reference   VITAMIN B12 221 pg/mL  100-900     (1) FOLATE 15FTJ1090 07:51AM Vida Godoy     Test Name Result Flag Reference   FOLATE 15 4 ng/mL  3 1-17 5     (1) TSH WITH FT4 REFLEX [id number] 07:51AM Vida Godoy     Test Name Result Flag Reference   TSH 1 670 uIU/mL  0 358-3 740   Patients undergoing fluorescein dye angiography may retain small amounts of fluorescein in the body for 48-72 hours post procedure  Samples containing fluorescein can produce falsely depressed TSH values   If the patient had this procedure,a specimen should be resubmitted post fluorescein clearance

## 2018-01-22 ENCOUNTER — TRANSCRIBE ORDERS (OUTPATIENT)
Dept: LAB | Facility: MEDICAL CENTER | Age: 68
End: 2018-01-22

## 2018-01-22 ENCOUNTER — APPOINTMENT (OUTPATIENT)
Dept: LAB | Facility: MEDICAL CENTER | Age: 68
End: 2018-01-22
Payer: COMMERCIAL

## 2018-01-22 VITALS — SYSTOLIC BLOOD PRESSURE: 99 MMHG | HEART RATE: 97 BPM | DIASTOLIC BLOOD PRESSURE: 70 MMHG

## 2018-01-22 VITALS
WEIGHT: 154.5 LBS | DIASTOLIC BLOOD PRESSURE: 78 MMHG | HEART RATE: 86 BPM | BODY MASS INDEX: 20.95 KG/M2 | SYSTOLIC BLOOD PRESSURE: 128 MMHG

## 2018-01-22 VITALS — HEIGHT: 72 IN | WEIGHT: 157.5 LBS | BODY MASS INDEX: 21.33 KG/M2

## 2018-01-22 DIAGNOSIS — G60.9 IDIOPATHIC PERIPHERAL NEUROPATHY: ICD-10-CM

## 2018-01-22 DIAGNOSIS — G60.9 IDIOPATHIC PERIPHERAL NEUROPATHY: Primary | ICD-10-CM

## 2018-01-22 LAB — C3 SERPL-MCNC: 94.8 MG/DL (ref 90–180)

## 2018-01-22 PROCEDURE — 84238 ASSAY NONENDOCRINE RECEPTOR: CPT

## 2018-01-22 PROCEDURE — 36415 COLL VENOUS BLD VENIPUNCTURE: CPT

## 2018-01-22 PROCEDURE — 86160 COMPLEMENT ANTIGEN: CPT

## 2018-01-22 PROCEDURE — 82128 AMINO ACIDS MULT QUAL: CPT | Performed by: PSYCHIATRY & NEUROLOGY

## 2018-01-22 PROCEDURE — 83883 ASSAY NEPHELOMETRY NOT SPEC: CPT

## 2018-01-22 PROCEDURE — 84165 PROTEIN E-PHORESIS SERUM: CPT

## 2018-01-22 PROCEDURE — 86376 MICROSOMAL ANTIBODY EACH: CPT

## 2018-01-23 VITALS
BODY MASS INDEX: 20.59 KG/M2 | HEIGHT: 72 IN | DIASTOLIC BLOOD PRESSURE: 76 MMHG | HEART RATE: 87 BPM | SYSTOLIC BLOOD PRESSURE: 154 MMHG | RESPIRATION RATE: 16 BRPM | WEIGHT: 152 LBS

## 2018-01-23 LAB
KAPPA LC FREE SER-MCNC: 16.6 MG/L (ref 3.3–19.4)
KAPPA LC FREE/LAMBDA FREE SER: 1 {RATIO} (ref 0.26–1.65)
LAMBDA LC FREE SERPL-MCNC: 16.6 MG/L (ref 5.7–26.3)
THYROPEROXIDASE AB SERPL-ACNC: 14 IU/ML (ref 0–34)

## 2018-01-23 NOTE — RESULT NOTES
Verified Results  * CT CHEST ABDOMEN PELVIS W CONTRAST 27VXI3132 10:59AM Martin Short Order Number: [id number]   Performing Comments: please evalluate pulmonary nodule, retroperitoneal lymphadenopathy, and stranding of bowel seen on previous ct scan   - Patient Instructions: To schedule this appointment, please contact Central Scheduling at [phone]  Test Name Result Flag Reference   CT CHEST ABDOMEN PELVIS W CONTRAST (Report)     CT CHEST, ABDOMEN AND PELVIS WITH IV CONTRAST     INDICATION: 26-year-old male with polyneuropathy is  In addition, asked to evaluate pulmonary nodule, retroperitoneal lymphadenopathy and stranding of bowel seen in prior CT      COMPARISON: Priors dating to September 12, 2016     TECHNIQUE: CT examination of the chest, abdomen and pelvis was performed  In addition to portal venous phase postcontrast scanning through the abdomen and pelvis, delayed phase postcontrast scanning was performed through the upper abdominal viscera  Reformatted images were created in axial, sagittal, and coronal planes  Radiation dose length product (DLP) for this visit: 570 51 mGy-cm   This examination, like all CT scans performed in the [facility], was performed utilizing techniques to minimize radiation dose exposure, including the use of iterative   reconstruction and automated exposure control  IV Contrast: 100 mL of iohexol (OMNIPAQUE)         Enteric Contrast: Enteric contrast was administered  FINDINGS:     CHEST     LUNGS: There is identified 2 mm nodule is less apparent than on prior exam, but can be seen on series 3 image 42  Additional 2 mm nodule is identified along the right major fissure on series 3 image 26 2-mm nodule within the right lung apex identified    on series 3 image 10 posteriorly  PLEURA: Unremarkable  HEART/GREAT VESSELS: Unremarkable for patient's age  MEDIASTINUM AND MORGAN: Unremarkable       CHEST WALL AND LOWER NECK: Normal      ABDOMEN     LIVER/BILIARY TREE: Unchanged 1 cm hyperdense lesion within the left liver lobe on series 2 image 52, likely representing a cyst      GALLBLADDER: No calcified gallstones  No pericholecystic inflammatory change  SPLEEN: Unremarkable  PANCREAS: Unremarkable  ADRENAL GLANDS: Unremarkable  KIDNEYS/URETERS: Unremarkable  No hydronephrosis  STOMACH AND BOWEL: Unremarkable  Oral contrast extends into the descending colon  APPENDIX: A normal appendix was visualized  ABDOMINOPELVIC CAVITY: The previously identified central mesenteric infiltration/stranding is not as apparent and appears to resolved  Similarly, the nodule adjacent to the left adrenal gland has decreased in size, now measuring 11 x 11 mm whereas    previously measured 13 x 14 mm  VESSELS: Unremarkable for patient's age  Calcified atherosclerotic disease is noted  PELVIS     REPRODUCTIVE ORGANS: Unremarkable for patient's age  URINARY BLADDER: Unremarkable  ABDOMINAL WALL/INGUINAL REGIONS: Unremarkable  OSSEOUS STRUCTURES: Degenerative disc disease is present at L5-S1  No acute fracture or destructive lesion  Potentially degenerative related cyst of the left acetabulum and iliac bone is unchanged, best appreciated on coronal image 80  Additional    subcortical cysts are noted within the right femoral head and neck  IMPRESSION:     Multiple solid pulmonary nodules less than 5 mm in diameter identified within the lungs bilaterally  The previously identified left lower lobe nodule is again present but less apparent than on the prior examination  Based on current Fleischner    Society 2017 Guidelines on incidental pulmonary nodule, no routine follow-up is needed if the patient is considered low risk for lung cancer  If the patient is considered high risk for lung cancer, 12 month follow-up non-contrast chest CT is    recommended       The previously identified central mesenteric stranding is not as apparent on this examination and appears to resolved in the interval  This may be associated with a inflammatory or infectious process at the time of the prior examination in September 2016  The lymph node adjacent to the left adrenal gland measures 11 mm x 11 mm, decreased in size compared to the prior exam when it measured 13 x 14 mm                 Workstation performed: [id number]     Signed by:   Eliud Rosen MD   12/9/17

## 2018-01-23 NOTE — RESULT NOTES
Verified Results  (1) HEMOGLOBIN A1C [id number] 07:51AM Oralia Carbajale     Test Name Result Flag Reference   HEMOGLOBIN A1C 8 3 % H 4 2-6 3   EST  AVG  GLUCOSE 192 mg/dl       (1) ZINC [id number] 07:51AM Marlou Nicholtone     Test Name Result Flag Reference   ZINC 99 ug/dL  56 - 134   Detection Limit = 5  Performed at:  [address], [address]  [facility]  [id number]  : Dimitry Roca MD, Phone:  [phone]     (1) DONAVON SCREEN W/REFLEX TO TITER/PATTERN [id number] 07:51AM [facility]     Test Name Result Flag Reference   DONAVON SCREEN  Negative  Negative     (1) PROTEIN ELECTRO, SERUM [id number] 07:51AM [facility]     Test Name Result Flag Reference   A/G RATIO 1 65  1 10-1 80   Albumin 62 2 %  52 0-65 0   Albumin Conc  4 48 g/dl  3 50-5 00   Alpha 1 Conc  0 32 g/dL  0 10-0 40   ALPHA 1 4 4 %  2 5-5 0   Alpha 2 Conc  0 85 g/dL  0 40-1 20   ALPHA 2 11 8 %  7 0-13 0   Beta 1 Conc  0 45 g/dL  0 40-0 80   BETA-1 6 3 %  5 0-13 0   Beta 2 Conc 0 35 g/dL  0 20-0 50   BETA-2 4 8 %  2 0-8 0   Gamma Conc 0 76 g/dL  0 50-1 60   GAMMA GLOBULIN 10 5 % L 12 0-22 0   Interpretation      The serum total protein, albumin and electrophoresis are within normal limits  No monoclonal bands noted  Reviewed by: Zheng Trinidad MD ([id number]) **Electronic Signature**   TOTAL PROTEIN  7 2 g/dL  6 4-8 2

## 2018-01-23 NOTE — PROGRESS NOTES
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,G63)   3  Bilateral leg weakness (729 89) (R29 898)   4  Diabetes mellitus (250 00) (E11 9)   5  Diabetic neuropathy (250 60,357 2) (E11 40)   6  Disease related peripheral neuropathy (356 8) (G62 89)   7  Dyslipidemia (272 4) (E78 5)   8  Enlarged lymph node (785 6) (R59 9)   9  Exercise counseling (V65 41) (Z71 82)   10  Herniated lumbar intervertebral disc (722 10) (M51 26)   11  Lumbar back pain with radiculopathy affecting right lower extremity (724 4) (M54 17)   12  Lumbar foraminal stenosis (724 02) (M99 83)   13  Lumbar radiculopathy (724 4) (M54 16)   14  Lumbar spondylosis (721 3) (M47 816)   15  Mild vitamin D deficiency (268 9) (E55 9)   16  Neuropathy, peripheral, idiopathic (356 9) (G60 9)   17  Pulmonary nodule seen on imaging study (793 11) (R91 1)    Past Medical History    1  History of Abnormal CT of the abdomen (793 6) (R93 5)   2  History of Atypical chest pain (786 59) (R07 89)   3  History of Bilateral hip pain (719 45) (M25 551,M25 552)   4  History of Depression screening (V79 0) (Z13 89)   5  History of Hamstring muscle strain, left, initial encounter (843 8) (K54 902A)   6  History of abdominal pain (V13 89) (Z87 898)   7  History of iron deficiency anemia (V12 3) (Z86 2)   8  History of pneumococcal vaccination (V49 89) (Z92 29)   9  History of transient cerebral ischemia (V12 54) (Z86 73)   10  History of Medicare annual wellness visit, subsequent (V70 0) (Z00 00)   11  History of Need for hepatitis C screening test (V73 89) (Z11 59)   12  History of Renal colic on right side (011 9) (N23)   13  History of Screening for diabetic peripheral neuropathy (V80 09) (Z13 89)   14   History of Screening for genitourinary condition (V81 6) (Z13 89)   15  History of Screening for neurological condition (V80 09) (Z13 89)   16  History of Special screening examination for neoplasm of prostate (V76 44) (Z12 5)   17  History of Trochanteric bursitis (726 5) (M70 60)   18  History of Visit for preventive health examination (V70 0) (Z00 00)    Surgical History    1  History Of Prior Surgery    Family History  Family History    1  No pertinent family history    Social History    · Cigar smoker (305 1) (F17 290)   · Former smoker (V15 82) (T53 311)   · No advance directives (V49 89) (Z78 9)   · History of No advance directives (V49 89) (Z78 9)   · No living will   · Social alcohol use (Z78 9)    Current Meds    1  Gabapentin 300 MG Oral Capsule; TAKE 1 CAPSULE TWICE DAILY; Therapy: 58CHK7251 to (Evaluate:96Efh5107)  Requested for: 21GGT1682; Last   Rx:22Nov2017 Ordered    2  Aspirin-Dipyridamole ER  MG Oral Capsule Extended Release 12 Hour; Take 1   capsule twice daily; Therapy: 37Vqy6247 to (Evaluate:12May2018)  Requested for: 57EQU4347; Last   Rx:13Nov2017 Ordered    3  Accu-Chek Karen Plus In Vitro Strip; test bid; Therapy: 59BQX9769 to (Last Rx:19May2015) Ordered   4  Accu-Chek Softclix Lancets Miscellaneous; test twice daily; Therapy: 18NCJ5701 to (Last Rx:19May2015) Ordered   5  Glyxambi 25-5 MG Oral Tablet; one po qd; Therapy: 90LVF7257 to (Evaluate:64Uvm6486)  Requested for: 64CAF6927; Last   Rx:11Jan2017 Ordered   6  MetFORMIN HCl - 1000 MG Oral Tablet; Take 1 tablet twice daily; Therapy: 54ZPB9039 to (Evaluate:06Jan2018)  Requested for: 85HYP3139; Last   Rx:36Hpa7856 Ordered   7  OneTouch Ultra Mini w/Device Kit; USE AS DIRECTED  Dx 250 E11 9 NIDD   Including, Battery, control solution, and lancing device; Therapy: 85IGL1846 to (Last Rx:28Nov2017)  Requested for: 28Nov2017 Ordered    8  Vitamin D (Ergocalciferol) 96964 UNIT Oral Capsule; TAKE 1 CAPSULE WEEKLY for 12   weeks;    Therapy: 03ZGZ3769 to (Last Rx:30Nov2017)  Requested for: 79WYP9221 Ordered    9  Tylenol 325 MG Oral Capsule; TAKE 2 CAPSULE Twice daily; Therapy: (Recorded:28Nov2017) to Recorded    Allergies    1  No Known Drug Allergies    Health Management Medicare Annual Wellness Visit; every 1 year; Next Due: 88CET6808; Overdue    End of Encounter Meds    1  Gabapentin 300 MG Oral Capsule; TAKE 1 CAPSULE TWICE DAILY; Therapy: 20AZK2897 to (Evaluate:22Dec2017)  Requested for: 21XWT5260; Last   Rx:22Nov2017 Ordered    2  Aspirin-Dipyridamole ER  MG Oral Capsule Extended Release 12 Hour; Take 1   capsule twice daily; Therapy: 65Ejb1560 to (Evaluate:12May2018)  Requested for: 25DIP7749; Last   Rx:13Nov2017 Ordered    3  Accu-Chek Karen Plus In Vitro Strip; test bid; Therapy: 12MDB2504 to (Last Rx:19May2015) Ordered   4  Accu-Chek Softclix Lancets Miscellaneous; test twice daily; Therapy: 26MDH5905 to (Last Rx:19May2015) Ordered   5  Glyxambi 25-5 MG Oral Tablet; one po qd; Therapy: 06DGJ2902 to (Evaluate:10Jul2017)  Requested for: 24UQZ5510; Last   Rx:11Jan2017 Ordered   6  MetFORMIN HCl - 1000 MG Oral Tablet; Take 1 tablet twice daily; Therapy: 50GYP0321 to (Evaluate:06Jan2018)  Requested for: 12QCO5568; Last   Rx:10Jul2017 Ordered   7  OneTouch Ultra Mini w/Device Kit; USE AS DIRECTED  Dx 250 E11 9 NIDD   Including, Battery, control solution, and lancing device; Therapy: 01XMJ1495 to (Last Rx:28Nov2017)  Requested for: 28Nov2017 Ordered    8  Vitamin D (Ergocalciferol) 31088 UNIT Oral Capsule; TAKE 1 CAPSULE WEEKLY for 12   weeks; Therapy: 31RAE8614 to (Last Rx:30Nov2017)  Requested for: 08UAG0007 Ordered    9  Tylenol 325 MG Oral Capsule; TAKE 2 CAPSULE Twice daily; Therapy: (Recorded:28Nov2017) to Recorded    Future Appointments    Date/Time Provider Specialty Site   02/19/2018 02:40 PM LEVI Davenport   Pulmonary Medicine [facility]   01/11/2018 10:30 AM Eliseo Cheek MD Neurology  [facility]   01/22/2018 09:00 AM LEVI Marsh  Urology [facility]     Patient Care Team    Care Team Member Role Specialty Office Number   Mariah Teresa   [phone]   Yeni Mejia MD Specialist Surgical Oncology [phone]   Donna ROD  Orthopedic Surgery [phone]   Rochelle Rondon DO  Pain Management [phone]   Marcelina ROD    Orthopedic Surgery [phone]     Signatures   Electronically signed by : Allyson Walsh, ; Dec 13 2017  3:20PM EST                       (Author)

## 2018-01-23 NOTE — PROGRESS NOTES
History of Present Illness  Care Coordination Encounter Information:    Spoke to  11/28/17 Dr Carolina Chappell asked CM to arrange in home safety fall risk assessment  Cm called [facility] pt in home already and referral given to do risk fall assessment and send Dr Carolina Chappell progress note with findings  The reason for this call is to discuss outreach for follow-up /needed services  Patient called to address any questions or concerns to explain coordination of care services and role of CM  NALM with CM contact info  and to discuss Dr Carolina Chappell risk home assessment attemtp #1  Active Problems    1  Ambulatory dysfunction (719 7) (R26 2)   2  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,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,G63)   3  Bilateral leg weakness (729 89) (R29 898)   4  Diabetes mellitus (250 00) (E11 9)   5  Diabetic neuropathy (250 60,357 2) (E11 40)   6  Disease related peripheral neuropathy (356 8) (G62 89)   7  Dyslipidemia (272 4) (E78 5)   8  Exercise counseling (V65 41) (Z71 82)   9  Herniated lumbar intervertebral disc (722 10) (M51 26)   10  Lumbar back pain with radiculopathy affecting right lower extremity (724 4) (M54 17)   11  Lumbar foraminal stenosis (724 02) (M99 83)   12  Lumbar radiculopathy (724 4) (M54 16)   13  Lumbar spondylosis (721 3) (M47 816)   14  Mild vitamin D deficiency (268 9) (E55 9)   15  Neuropathy, peripheral, idiopathic (356 9) (G60 9)   16  Pulmonary nodule seen on imaging study (793 11) (R91 1)    Past Medical History    1  History of Abnormal CT of the abdomen (793 6) (R93 5)   2  History of Atypical chest pain (786 59) (R07 89)   3  History of Bilateral hip pain (719 45) (M25 551,M25 552)   4  History of Depression screening (V79 0) (Z13 89)   5  History of Hamstring muscle strain, left, initial encounter (843 8) (S81 783G)   6  History of abdominal pain (V13 89) (Z87 898)   7  History of iron deficiency anemia (V12 3) (Z86 2)   8  History of pneumococcal vaccination (V49 89) (Z92 29)   9   History of transient cerebral ischemia (V12 54) (Z86 73)   10  History of Medicare annual wellness visit, subsequent (V70 0) (Z00 00)   11  History of Need for hepatitis C screening test (V73 89) (Z11 59)   12  History of Renal colic on right side (016 0) (N23)   13  History of Screening for diabetic peripheral neuropathy (V80 09) (Z13 89)   14  History of Screening for genitourinary condition (V81 6) (Z13 89)   15  History of Screening for neurological condition (V80 09) (Z13 89)   16  History of Special screening examination for neoplasm of prostate (V76 44) (Z12 5)   17  History of Trochanteric bursitis (726 5) (M70 60)   18  History of Visit for preventive health examination (V70 0) (Z00 00)    Surgical History    1  History Of Prior Surgery    Family History  Family History    1  No pertinent family history    Social History    · Cigar smoker (305 1) (F17 290)   · Former smoker (V15 82) (I37 645)   · No advance directives (V49 89) (Z78 9)   · History of No advance directives (V49 89) (Z78 9)   · No living will   · Social alcohol use (Z78 9)    Current Meds    1  Gabapentin 300 MG Oral Capsule; TAKE 1 CAPSULE TWICE DAILY; Therapy: 99ZGG4602 to (Evaluate:84Rfk9544)  Requested for: 59FGR8084; Last   Rx:22Nov2017 Ordered    2  Aspirin-Dipyridamole ER  MG Oral Capsule Extended Release 12 Hour; Take 1   capsule twice daily; Therapy: 11Wdw9535 to (Evaluate:86Xan1040)  Requested for: 16XQN2927; Last   Rx:13Nov2017 Ordered    3  Accu-Chek Karen Plus In Vitro Strip; test bid; Therapy: 50NAH1109 to (Last Rx:19May2015) Ordered   4  Accu-Chek Softclix Lancets Miscellaneous; test twice daily; Therapy: 16WYT7827 to (Last Rx:19May2015) Ordered   5  Glyxambi 25-5 MG Oral Tablet; one po qd; Therapy: 90TXX9418 to (Evaluate:47Pzo6433)  Requested for: 02PNK5602; Last   Rx:11Jan2017 Ordered   6  MetFORMIN HCl - 1000 MG Oral Tablet; Take 1 tablet twice daily;    Therapy: 98LBR6854 to (Evaluate:06Jan2018)  Requested for: 67OLF8083; Last Rx: 15YZY0893 Ordered   7  OneTouch Ultra Mini w/Device Kit; USE AS DIRECTED  Dx 250 E11 9 NIDD   Including, Battery, control solution, and lancing device; Therapy: 25RPM9451 to (Last Rx:28Nov2017)  Requested for: 28Nov2017 Ordered    8  Vitamin D (Ergocalciferol) 84521 UNIT Oral Capsule; TAKE 1 CAPSULE WEEKLY for 12   weeks; Therapy: 41JNU5914 to (Last Rx:30Nov2017)  Requested for: 29DAY6787 Ordered    9  Tylenol 325 MG Oral Capsule; TAKE 2 CAPSULE Twice daily; Therapy: (Recorded:28Nov2017) to Recorded    Allergies    1  No Known Drug Allergies    Health Management Medicare Annual Wellness Visit; every 1 year; Next Due: 49IKI8465; Overdue    End of Encounter Meds    1  Gabapentin 300 MG Oral Capsule; TAKE 1 CAPSULE TWICE DAILY; Therapy: 19ELJ7192 to (Evaluate:44Qpu4139)  Requested for: 50HGG6647; Last   Rx:22Nov2017 Ordered    2  Aspirin-Dipyridamole ER  MG Oral Capsule Extended Release 12 Hour; Take 1   capsule twice daily; Therapy: 07Clj2719 to (Evaluate:12May2018)  Requested for: 62JJO6434; Last   Rx:13Nov2017 Ordered    3  Accu-Chek Karen Plus In Vitro Strip; test bid; Therapy: 69FCK0219 to (Last Rx:19May2015) Ordered   4  Accu-Chek Softclix Lancets Miscellaneous; test twice daily; Therapy: 63XRZ3102 to (Last Rx:19May2015) Ordered   5  Glyxambi 25-5 MG Oral Tablet; one po qd; Therapy: 66CGV5449 to (Evaluate:10Jul2017)  Requested for: 62ELK4432; Last   Rx:11Jan2017 Ordered   6  MetFORMIN HCl - 1000 MG Oral Tablet; Take 1 tablet twice daily; Therapy: 61XDY4698 to (Evaluate:06Jan2018)  Requested for: 80NSF9778; Last   Rx:69Fub7339 Ordered   7  OneTouch Ultra Mini w/Device Kit; USE AS DIRECTED  Dx 250 E11 9 NIDD   Including, Battery, control solution, and lancing device; Therapy: 09SCZ4487 to (Last Rx:28Nov2017)  Requested for: 28Nov2017 Ordered    8  Vitamin D (Ergocalciferol) 79243 UNIT Oral Capsule; TAKE 1 CAPSULE WEEKLY for 12   weeks;    Therapy: 83ZOK9354 to (Last Rx:30Nov2017) Requested for: 91TYU7440 Ordered    9  Tylenol 325 MG Oral Capsule; TAKE 2 CAPSULE Twice daily; Therapy: (Recorded:48Foh5945) to Recorded    Future Appointments    Date/Time Provider Specialty Site   01/11/2018 10:30 AM Eliseo Cheek MD Neurology [facility]     Patient Care Team    Care Team Member Role Specialty Office Number   Meena Sims   [phone]   Beatriz Pizano MD Specialist Surgical Oncology [phone]   Lorrie ROD  Orthopedic Surgery [phone]   Demetria Badillo DO  Pain Management [phone]   Trenton ROD    Orthopedic Surgery [phone]     Signatures   Electronically signed by : Janice Saavedra, ; Dec  5 2017 11:15AM EST                       (Author)

## 2018-01-24 ENCOUNTER — APPOINTMENT (OUTPATIENT)
Dept: LAB | Facility: MEDICAL CENTER | Age: 68
End: 2018-01-24
Payer: COMMERCIAL

## 2018-01-24 ENCOUNTER — TRANSCRIBE ORDERS (OUTPATIENT)
Dept: LAB | Facility: MEDICAL CENTER | Age: 68
End: 2018-01-24

## 2018-01-24 DIAGNOSIS — G60.9 IDIOPATHIC PERIPHERAL NEUROPATHY: Primary | ICD-10-CM

## 2018-01-24 DIAGNOSIS — G60.9 IDIOPATHIC PERIPHERAL NEUROPATHY: ICD-10-CM

## 2018-01-24 LAB
(HCYS)2/CREAT UR-RTO: 0.4 UMOL/G CR (ref 0–2)
A-AMINOBUTYR/CREAT UR-RTO: 8.2 UMOL/G CR (ref 0–34.6)
AAA/CREAT UR-RTO: 53.5 UMOL/G CR (ref 0–146.7)
ALANINE/CREAT UR-RTO: 596.5 UMOL/G CR (ref 0–1337)
ALLOISOLEUCINE/CREAT UR-RTO: 1 UMOL/G CR (ref 0–13.5)
AMINO ACID PAT SERPL-IMP: NORMAL
AMINO ACID, QN URINE: NORMAL
ARGININE/CREAT UR-RTO: 21.5 UMOL/G CR (ref 0–69.6)
ARGININOSUCCINATE/CREAT UR-RTO: 11.2 UMOL/G CR (ref 0–51.2)
ASPARAGINE/CREAT UR-RTO: 92.2 UMOL/G CR (ref 0–454.2)
ASPARTATE/CREAT UR-RTO: 4.5 UMOL/G CR (ref 0–86.7)
B-AIB/CREAT UR-RTO: 22.7 UMOL/G CR (ref 0–807.9)
B-ALANINE/CREAT UR-RTO: 4.9 UMOL/G CR (ref 0–869.8)
CITRULLINE/CREAT UR-RTO: 3.3 UMOL/G CR (ref 0–27.4)
CREAT 24H UR-MRATE: 0.7 G/24HR (ref 0.8–1.8)
CYSTATHIONIN/CREAT UR-RTO: 7.1 UMOL/G CR (ref 0–80.8)
CYSTINE/CREAT UR-RTO: 77.8 UMOL/G CR (ref 0–223.8)
GABA/CREAT UR-RTO: 1.5 UMOL/G CR (ref 0–13.1)
GLUTAMATE/CREAT UR-RTO: 9.8 UMOL/G CR (ref 0–92.4)
GLUTAMINE/CREAT UR-RTO: 441.5 UMOL/G CR (ref 0–1756.2)
GLYCINE/CREAT UR-RTO: 1021.7 UMOL/G CR (ref 0–7996.9)
HISTIDINE/CREAT UR-RTO: 497.6 UMOL/G CR (ref 0–2534.2)
HOMOCITRULLINE/CREAT UR-RTO: 10.4 UMOL/G CR (ref 0–80)
ISOLEUCINE/CREAT UR-RTO: 12.1 UMOL/G CR (ref 0–48.1)
LAB DIRECTOR NAME PROVIDER: NORMAL
LEUCINE/CREAT UR-RTO: 19.7 UMOL/G CR (ref 0–129.1)
LYSINE/CREAT UR-RTO: 94.5 UMOL/G CR (ref 0–1020.6)
METHIONINE/CREAT UR-RTO: 6.7 UMOL/G CR (ref 0–37.1)
OH-LYSINE/CREAT UR-RTO: 8.6 UMOL/G CR (ref 0–37.3)
OH-PROLINE/CREAT UR-RTO: 22.7 UMOL/G CR (ref 0–87.9)
ORNITHINE/CREAT UR-RTO: 19.1 UMOL/G CR (ref 0–76.3)
PERIOD: 24 HOURS
PHE/CREAT UR-RTO: 33.5 UMOL/G CR (ref 0–239)
PROLINE/CREAT UR-RTO: 27.8 UMOL/G CR (ref 0–168.6)
REF LAB TEST METHOD: NORMAL
SARCOSINE/CREAT UR-RTO: 2.4 UMOL/G CR (ref 0–27.3)
SERINE/CREAT UR-RTO: 382.6 UMOL/G CR (ref 0–1052.8)
SODIUM 24H UR-SRATE: 155 MMOL/24 HRS (ref 40–220)
SPECIMEN VOL UR: 1550 ML
SPECIMEN VOL UR: 1550 ML
TAURINE/CREAT UR-RTO: 283.2 UMOL/G CR (ref 0–5335.7)
THREONINE/CREAT UR-RTO: 162.9 UMOL/G CR (ref 0–714.9)
TRYPTOPHAN/CREAT UR-RTO: 48.2 UMOL/G CR (ref 0–207.5)
TYROSINE/CREAT UR-RTO: 39.2 UMOL/G CR (ref 0–388.9)
VALINE/CREAT UR-RTO: 25.6 UMOL/G CR (ref 0–147.4)

## 2018-01-24 PROCEDURE — 84300 ASSAY OF URINE SODIUM: CPT

## 2018-01-24 PROCEDURE — 82570 ASSAY OF URINE CREATININE: CPT

## 2018-01-25 ENCOUNTER — HOSPITAL ENCOUNTER (OUTPATIENT)
Dept: RADIOLOGY | Facility: HOSPITAL | Age: 68
Discharge: HOME | End: 2018-01-25
Attending: PSYCHIATRY & NEUROLOGY | Admitting: RADIOLOGY
Payer: COMMERCIAL

## 2018-01-25 VITALS
TEMPERATURE: 98.6 F | OXYGEN SATURATION: 98 % | HEIGHT: 72 IN | RESPIRATION RATE: 18 BRPM | SYSTOLIC BLOOD PRESSURE: 135 MMHG | DIASTOLIC BLOOD PRESSURE: 81 MMHG | HEART RATE: 78 BPM | BODY MASS INDEX: 20.32 KG/M2 | WEIGHT: 150 LBS

## 2018-01-25 DIAGNOSIS — G62.89 DISEASE RELATED PERIPHERAL NEUROPATHY: ICD-10-CM

## 2018-01-25 LAB
APPEARANCE CSF: CLEAR
APTT PPP: 26 SECONDS (ref 23–35)
GLUCOSE CSF-MCNC: 50 MG/DL (ref 50–80)
INR PPP: 0.92 (ref 0.86–1.16)
PLATELET # BLD AUTO: 385 THOUSANDS/UL (ref 149–390)
PMV BLD AUTO: 9.7 FL (ref 8.9–12.7)
PROT CSF-MCNC: 53 MG/DL (ref 15–45)
PROTHROMBIN TIME: 12.4 SECONDS (ref 12.1–14.4)
RBC # CSF MANUAL: 3 UL (ref 0–10)
TOTAL CELLS COUNTED BLD: NO
TUBE # CSF: 4
WBC # CSF AUTO: 2 /UL (ref 0–5)

## 2018-01-25 PROCEDURE — 82040 ASSAY OF SERUM ALBUMIN: CPT | Performed by: PSYCHIATRY & NEUROLOGY

## 2018-01-25 PROCEDURE — 84165 PROTEIN E-PHORESIS SERUM: CPT | Performed by: PSYCHIATRY & NEUROLOGY

## 2018-01-25 PROCEDURE — 82042 OTHER SOURCE ALBUMIN QUAN EA: CPT | Performed by: PSYCHIATRY & NEUROLOGY

## 2018-01-25 PROCEDURE — 83916 OLIGOCLONAL BANDS: CPT | Performed by: PSYCHIATRY & NEUROLOGY

## 2018-01-25 PROCEDURE — 82164 ANGIOTENSIN I ENZYME TEST: CPT | Performed by: PSYCHIATRY & NEUROLOGY

## 2018-01-25 PROCEDURE — 83873 ASSAY OF CSF PROTEIN: CPT | Performed by: PSYCHIATRY & NEUROLOGY

## 2018-01-25 PROCEDURE — 89051 BODY FLUID CELL COUNT: CPT | Performed by: PSYCHIATRY & NEUROLOGY

## 2018-01-25 PROCEDURE — 88108 CYTOPATH CONCENTRATE TECH: CPT | Performed by: PATHOLOGY

## 2018-01-25 PROCEDURE — 87476 LYME DIS DNA AMP PROBE: CPT | Performed by: PSYCHIATRY & NEUROLOGY

## 2018-01-25 PROCEDURE — 84165 PROTEIN E-PHORESIS SERUM: CPT | Performed by: PATHOLOGY

## 2018-01-25 PROCEDURE — 88108 CYTOPATH CONCENTRATE TECH: CPT | Performed by: PSYCHIATRY & NEUROLOGY

## 2018-01-25 PROCEDURE — 62270 DX LMBR SPI PNXR: CPT

## 2018-01-25 PROCEDURE — 85730 THROMBOPLASTIN TIME PARTIAL: CPT | Performed by: PHYSICIAN ASSISTANT

## 2018-01-25 PROCEDURE — 82945 GLUCOSE OTHER FLUID: CPT | Performed by: PSYCHIATRY & NEUROLOGY

## 2018-01-25 PROCEDURE — 89050 BODY FLUID CELL COUNT: CPT | Performed by: PSYCHIATRY & NEUROLOGY

## 2018-01-25 PROCEDURE — 87070 CULTURE OTHR SPECIMN AEROBIC: CPT | Performed by: PSYCHIATRY & NEUROLOGY

## 2018-01-25 PROCEDURE — 85610 PROTHROMBIN TIME: CPT | Performed by: PHYSICIAN ASSISTANT

## 2018-01-25 PROCEDURE — 84157 ASSAY OF PROTEIN OTHER: CPT | Performed by: PSYCHIATRY & NEUROLOGY

## 2018-01-25 PROCEDURE — 86592 SYPHILIS TEST NON-TREP QUAL: CPT | Performed by: PSYCHIATRY & NEUROLOGY

## 2018-01-25 PROCEDURE — 82784 ASSAY IGA/IGD/IGG/IGM EACH: CPT | Performed by: PSYCHIATRY & NEUROLOGY

## 2018-01-25 PROCEDURE — 87798 DETECT AGENT NOS DNA AMP: CPT | Performed by: PSYCHIATRY & NEUROLOGY

## 2018-01-25 PROCEDURE — 85049 AUTOMATED PLATELET COUNT: CPT | Performed by: PHYSICIAN ASSISTANT

## 2018-01-25 RX ORDER — LIDOCAINE HYDROCHLORIDE 10 MG/ML
20 INJECTION, SOLUTION INFILTRATION; PERINEURAL
Status: DISCONTINUED | OUTPATIENT
Start: 2018-01-25 | End: 2018-01-25

## 2018-01-25 RX ORDER — LIDOCAINE HYDROCHLORIDE 10 MG/ML
20 INJECTION, SOLUTION INFILTRATION; PERINEURAL
Status: COMPLETED | OUTPATIENT
Start: 2018-01-25 | End: 2018-01-25

## 2018-01-25 RX ADMIN — LIDOCAINE HYDROCHLORIDE 2 ML: 10 INJECTION, SOLUTION INFILTRATION; PERINEURAL at 14:00

## 2018-01-25 NOTE — DISCHARGE INSTRUCTIONS
Lumbar Puncture   WHAT YOU NEED TO KNOW:   Lumbar puncture (LP) is a procedure in which a needle is inserted in your back and into your spinal canal  This is usually done to collect cerebrospinal fluid (CSF) to check for an infection, inflammation, bleeding, or other conditions that affect the brain  CSF is a clear, protective fluid that flows around the brain and inside the spinal canal  LP may also be done to remove CSF to reduce pressure in the brain  DISCHARGE INSTRUCTIONS:   Medicines:   · Acetaminophen: This medicine decreases pain and lowers a fever  It is available without a doctor's order  Ask how much to take and how often to take it  Follow directions  Acetaminophen can cause liver damage  · NSAIDs:  These medicines decrease swelling, pain, and fever  NSAIDs are available without a doctor's order  Ask your healthcare provider which medicine is right for you and how much to take  Take as directed  NSAIDs can cause stomach bleeding or kidney problems if not taken correctly  · Pain medicine: You may be given a prescription medicine to decrease severe pain  Do not wait until the pain is severe before you take more pain medicine  · Take your medicine as directed  Contact your healthcare provider if you think your medicine is not helping or if you have side effects  Tell him or her if you are allergic to any medicine  Keep a list of the medicines, vitamins, and herbs you take  Include the amounts, and when and why you take them  Bring the list or the pill bottles to follow-up visits  Carry your medicine list with you in case of an emergency  Follow up with your healthcare provider as directed:  Write down your questions so you remember to ask them during your visits  Post-lumbar puncture headache: You may develop a headache during the first few hours after your LP that may last for several days  The headache may be mild to severe and may get worse when you sit or stand   The following may help ease a post-lumbar puncture headache:  · Drink plenty of liquids: You should drink more liquid than usual after your LP  Ask how much liquid is right for you  Caffeine may be used to treat a headache  Drinks, such as coffee, tea, or some sodas, have caffeine  Caffeine is also available over the counter in tablet form  Ask about using caffeine to treat your headache  Do not drink alcohol  · Lie down: If you have a headache after your lumbar puncture, it may be helpful to lie down and rest   Contact your healthcare provider if:   · You have questions or concerns about your condition or care  Seek care immediately or call 911 if:   · You have a severe headache that does not get better after you lie down  · You have a fever  · You have a stiff neck or have trouble thinking clearly  · Your legs, feet, or other parts below the waist feel numb, tingly, or weak  · You have bleeding or a discharge coming from the area where the needle was put into your back  · You have severe pain in your back or neck  © 2017 [facility] Information is for End User's use only and may not be sold, redistributed or otherwise used for commercial purposes  All illustrations and images included in CareNotes® are the copyrighted property of [employer]  or [employer]  The above information is an  only  It is not intended as medical advice for individual conditions or treatments  Talk to your doctor, nurse or pharmacist before following any medical regimen to see if it is safe and effective for you

## 2018-01-25 NOTE — PROGRESS NOTES
Assumed care of patient at this time  Patient is S/P LP  In supine position with HOB flat til 1620 hours  In NAD

## 2018-01-26 LAB
ACHR BIND AB SER-SCNC: <0.03 NMOL/L (ref 0–0.24)
ALBUMIN SERPL ELPH-MCNC: 4.25 G/DL (ref 3.5–5)
ALBUMIN SERPL ELPH-MCNC: 61.6 % (ref 52–65)
ALPHA1 GLOB SERPL ELPH-MCNC: 0.3 G/DL (ref 0.1–0.4)
ALPHA1 GLOB SERPL ELPH-MCNC: 4.3 % (ref 2.5–5)
ALPHA2 GLOB SERPL ELPH-MCNC: 0.79 G/DL (ref 0.4–1.2)
ALPHA2 GLOB SERPL ELPH-MCNC: 11.5 % (ref 7–13)
BETA GLOB ABNORMAL SERPL ELPH-MCNC: 0.42 G/DL (ref 0.4–0.8)
BETA1 GLOB SERPL ELPH-MCNC: 6.1 % (ref 5–13)
BETA2 GLOB SERPL ELPH-MCNC: 4.9 % (ref 2–8)
BETA2+GAMMA GLOB SERPL ELPH-MCNC: 0.34 G/DL (ref 0.2–0.5)
GAMMA GLOB ABNORMAL SERPL ELPH-MCNC: 0.8 G/DL (ref 0.5–1.6)
GAMMA GLOB SERPL ELPH-MCNC: 11.6 % (ref 12–22)
IGG/ALB SER: 1.6 {RATIO} (ref 1.1–1.8)
LYMPHOCYTES NFR CSF MANUAL: 80 %
MONOS+MACROS CSF MANUAL: 20 %
PATHOLOGIST INTERPRETATION: NORMAL
PATHOLOGY REVIEW: YES
PROT PATTERN SERPL ELPH-IMP: ABNORMAL
PROT SERPL-MCNC: 6.9 G/DL (ref 6.4–8.2)
TOTAL CELLS COUNTED SPEC: 35

## 2018-01-28 LAB
ALBUMIN SERPL ELPH-MCNC: 4.75 G/DL (ref 3.5–5)
ALBUMIN SERPL ELPH-MCNC: 61.7 % (ref 52–65)
ALPHA1 GLOB SERPL ELPH-MCNC: 0.32 G/DL (ref 0.1–0.4)
ALPHA1 GLOB SERPL ELPH-MCNC: 4.1 % (ref 2.5–5)
ALPHA2 GLOB SERPL ELPH-MCNC: 0.88 G/DL (ref 0.4–1.2)
ALPHA2 GLOB SERPL ELPH-MCNC: 11.4 % (ref 7–13)
BACTERIA CSF CULT: NO GROWTH
BETA GLOB ABNORMAL SERPL ELPH-MCNC: 0.49 G/DL (ref 0.4–0.8)
BETA1 GLOB SERPL ELPH-MCNC: 6.3 % (ref 5–13)
BETA2 GLOB SERPL ELPH-MCNC: 5 % (ref 2–8)
BETA2+GAMMA GLOB SERPL ELPH-MCNC: 0.39 G/DL (ref 0.2–0.5)
GAMMA GLOB ABNORMAL SERPL ELPH-MCNC: 0.89 G/DL (ref 0.5–1.6)
GAMMA GLOB SERPL ELPH-MCNC: 11.5 % (ref 12–22)
IGG/ALB SER: 1.61 {RATIO} (ref 1.1–1.8)
PROT PATTERN SERPL ELPH-IMP: ABNORMAL
PROT SERPL-MCNC: 7.7 G/DL (ref 6.4–8.2)

## 2018-01-29 LAB
IGG CSF-MCNC: 3 MG/DL (ref 0–8.6)
REAGIN AB CSF QL: NON REACTIVE
SCAN RESULT: NORMAL

## 2018-01-30 LAB
ACE CSF-CCNC: 1.3 U/L (ref 0–2.5)
ALB CSF/SERPL: 6 {RATIO} (ref 0–8)
ALBUMIN CSF-MCNC: 26 MG/DL (ref 11–48)
ALBUMIN SERPL-MCNC: 4.5 G/DL (ref 3.6–4.8)
B BURGDOR DNA SPEC QL NAA+PROBE: NEGATIVE
IGG CSF-MCNC: 2.9 MG/DL (ref 0–8.6)
IGG SERPL-MCNC: 804 MG/DL (ref 700–1600)
IGG SYNTH RATE SER+CSF CALC-MRATE: 1.1 MG/DAY
IGG/ALB CLEAR SER+CSF-RTO: 0.6 (ref 0–0.7)
IGG/ALB CSF: 0.11 {RATIO} (ref 0–0.25)
MBP CSF-MCNC: 2.5 NG/ML (ref 0–1.2)
OLIGOCLONAL BANDS.IT SER+CSF QL: ABNORMAL

## 2018-01-31 LAB — VZV DNA SPEC QL NAA+PROBE: NEGATIVE

## 2018-02-01 ENCOUNTER — PATIENT OUTREACH (OUTPATIENT)
Dept: FAMILY MEDICINE CLINIC | Facility: CLINIC | Age: 68
End: 2018-02-01

## 2018-02-01 NOTE — PROGRESS NOTES
Called pt for followup  He states doing the same  had a headache for days after spinal tap  His daughter will go with him to neuro appt to get results  continues with PCA 3x weekly for help with laundry and ADL  Pt feels MOW adequately meeting his needs as his daughter does grocery shopping weekly to supplement  Reviewed exacerbation symptoms to watch for condition worsening and need to notify care team  Agrees to follow-up call and has CM contact info   followup apt neuro 2/13/18

## 2018-02-05 ENCOUNTER — PATIENT OUTREACH (OUTPATIENT)
Dept: FAMILY MEDICINE CLINIC | Facility: CLINIC | Age: 68
End: 2018-02-05

## 2018-02-06 DIAGNOSIS — E11.9 TYPE 2 DIABETES MELLITUS WITHOUT COMPLICATION, WITHOUT LONG-TERM CURRENT USE OF INSULIN (HCC): Primary | ICD-10-CM

## 2018-02-06 DIAGNOSIS — G62.9 NEUROPATHY: ICD-10-CM

## 2018-02-06 RX ORDER — GABAPENTIN 300 MG/1
1 CAPSULE ORAL 3 TIMES DAILY
COMMUNITY
Start: 2017-11-22 | End: 2018-03-06 | Stop reason: SDUPTHER

## 2018-02-06 RX ORDER — ERGOCALCIFEROL 1.25 MG/1
1 CAPSULE ORAL WEEKLY
COMMUNITY
Start: 2017-11-30 | End: 2021-12-06 | Stop reason: ALTCHOICE

## 2018-02-06 RX ORDER — GABAPENTIN 300 MG/1
300 CAPSULE ORAL 3 TIMES DAILY
Qty: 90 CAPSULE | Refills: 1 | OUTPATIENT
Start: 2018-02-06

## 2018-02-06 RX ORDER — LANCETS
EACH MISCELLANEOUS 2 TIMES DAILY
COMMUNITY
Start: 2015-05-18

## 2018-02-06 NOTE — TELEPHONE ENCOUNTER
I did not prescribe this medicine for this patient word at this prescription come from what doctor is prescribing it

## 2018-02-12 NOTE — PROGRESS NOTES
Progress Note - Neurology   Hiren Sy 79 y o  male MRN: [id number]      Assessment/Plan   Assessment:  Peripheral polyneuropathy  Ambulatory dysfunction  Bilateral foot drop  Low vitamin B12  Lumbar radiculopathy  History of TIA  Plan:  Patient is a 70-year-old gentleman complains of ambulatory dysfunction and progressive weakness of the bilateral lower extremities for about a year associated with paresthesias and bilateral lower extremity pain, atrophy of the muscles, footdrop and weight loss  The patient had an abnormal EMG of bilateral lower extremities suggestive of neuropathy axonal and demyelinating in nature, with active denervation  Differential diagnosis include CIDP, diabetic amyotrophy, rule out diffuse denervation process like MND, lumbosacral plexopathy  With foot drops and loss of reflexes, sensory and motor symptoms, 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  However recommend further testing to rule out other differential diagnosis  Recommend obtaining EMG of bilateral upper extremities to evaluate for diffuse denervation process  The recommend MRI for lumbosacral plexus bilaterally  Recommend continuing with physical therapy  Will prescribe AFO  Recommend B12 shot today in the office and to continue with his PCP every feet for at least 4 weeks and then to repeat a B12 level in the 5th week  If l B12 level less than 400, shots may be continued and if greater than 400 oral B12 could be considered  Paraneoplastic panel was ordered by Dr Leyva, and will order it again as the results could not be found  Discussed option of considering IVIG empirically and the patient wants to consider home infusions for IVIG as it is difficult for him to range for transportation    Will order for home IVIG in submit for preauthorize with the clinical team   Patient is not sure if he will get the testing ordered and will consider it as he states he is tired of taking the test done  Fall precautions have been recommended  Patient continuing with Aggrenox for old TIA  Will have the patient follow up in 3-4 months    Reason for Follow up: Ambulatory dysfunction  HPI: Jonas Onofre is a 79 y o  male who presents with ambulatory dysfunction  The patient is a 54-year-old gentleman coming in for neuromuscular consultation in regards to ambulatory dysfunction  Patient was evaluated by Dr Haley Alvarez in our practice on 01/11/2018 and was referred for neuromuscular consultation  Patient states that he was ambulating without assistance until December 2016  In January 2017 he started noticing left lower extremity weakness and was limping  He went to PT and did not help  His PT noticed some muscle wasting  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  In August 2017 he started having weakness of the right lower extremity  He had MRI L-spine and was evaluated by pain management and LG and did not help  He also complaints of burning, numbness and pain in the thighs and groin to the feet bilaterally  He takes Gabapentin 300 mg four times a day  He denies any complaints of symptoms in hands  He denies any diplopia, dysphagia, SOB, urinary or bowel incontinence  He feels that he is improving in last few weeks with PT  Patient had an MRI of the lumbosacral spine that showed no evidence of cord compression however showed L5-S1 radiculopathy  He was evaluated by Dr Talib Alex and he did not feel disc was responsible for his symptoms  He had an EMG performed that showed evidence of axonal peripheral polyneuropathy and feels that his right lower extremity gives out randomly  Patient also complained that he cannot stand for prolonged periods  Additionally patient also reports that he lost about 50 lbs in the last 18 months  He is also following up with the pulmonologist as he is noted to have a pulmonary not do and he has seen Urology as there was a nodule in the adrenal gland    In regards to his old, TIA patient is on Aggrenox and is following with our stroke specialist, Dr Ralph Brewster      The EMG performed in our lab on 11/20/2017 showed a generalized, sensorimotor predominantly axonal with some demyelinating features peripheral polyneuropathy, diffuse denervation was noted and difficult to differentiate if you to neuropathy or radiculopathy and however some denervation was noted in right paraspinal muscles as well  Recommend EMG of bilateral upper extremities for further evaluation and Neurology consultation  An underlying chronic/active bilateral L4, L5, S1 radiculopathies with some denervation in the right lower lumbar paraspinal muscles  There was no evidence of myopathy  CSF showed WBCs 2, RBC 3, total protein 53, VDRL nonreactive, CSF glucose 50, CSF IgG index 0 6, IgG synthetic rate 1 1, MBP 2 5, oligo clonal bands 0  CSF cytology negative for malignancy  VZV PCR negative, Lyme PCR negative  CSF culture no growth  ACH receptor antibodies negative, C3 complement normal  The thyroid microsomal antibody normal   Immunoglobulin free light change in serum normal  Sharon normal, HbA1c 8 3, Lyme negative, B6 8 2, B12 221, vitamin-D 13  SPEP showed no monoclonal bands  MRI thoracic spine on 11/07/2017 was normal   MRI cervical spine on 11/17/2017 showed mild noncompressive cervical spondylitic degenerative change  MRI lumbar spine in September 2017 showed mild lumbar degenerative disc disease with annular bulging and minimal facet degenerative change  At L5-S1 there is severe left and moderate right foraminal narrowing  No cauda equina compression  CT chest abdomen pelvis in September 2016 showed 1  No urinary tract calculi  No hydronephrosis  Mild stranding in the small bowel mesentery without pathologic lymphadenopathy  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 though early lymphoma can have a similar appearance  Six-month follow-up CT is recommended  Mildly enlarged retroperitoneal lymph node adjacent to the left adrenal gland  This can also be reevaluated on follow-up  2 mm left lower lobe lung nodule  According to guidelines by the [employer]ischner society (Radiology 2005; 489:254-579) in patients with low risk for lung cancer and nodules less than 5 mm in diameter no further imaging is required  In patients with a higher risk, such as those who are smokers, follow-up is recommended in one year  Patients with a known malignancy and are at increased risk of metastasis should receive three month follow-up  Review of Systems   Constitutional: Negative  Negative for appetite change and fever  HENT: Negative  Negative for hearing loss, tinnitus, trouble swallowing and voice change  Eyes: Negative  Negative for photophobia and pain  Respiratory: Positive for chest tightness  Negative for shortness of breath  Cardiovascular: Negative  Negative for palpitations  Gastrointestinal: Negative  Negative for nausea and vomiting  Endocrine: Negative  Negative for cold intolerance and heat intolerance  Genitourinary: Negative  Negative for dysuria, frequency and urgency  Musculoskeletal: Positive for gait problem  Negative for myalgias and neck pain  Skin: Negative  Negative for rash  Neurological: Negative for dizziness, tremors, seizures, syncope, facial asymmetry, speech difficulty, weakness, light-headedness, numbness and headaches  Hematological: Negative  Does not bruise/bleed easily  Psychiatric/Behavioral: Negative  Negative for confusion, hallucinations and sleep disturbance           Historical Information   Past Medical History:   Diagnosis Date    Anemia     Diabetes mellitus ([address] )     TIA (transient ischemic attack)      Past Surgical History:   Procedure Laterality Date    EPIDURAL BLOCK INJECTION Bilateral 10/19/2017    Procedure: L5 TRANSFORAMINAL EPIDURAL STEROID INJECTION;  Surgeon: Luigi Fisher DO;  Location: [facility];  Service: Pain Management      Social History   History   Alcohol Use    Yes     Comment: social     History   Drug Use No     History   Smoking Status    Former Smoker   Smokeless Tobacco    Never Used     Family History: No family history on file  Meds/Allergies     Current Outpatient Prescriptions:     ACCU-CHEK SOFTCLIX LANCETS lancets, by Does not apply route 2 (two) times a day, Disp: , Rfl:     Acetaminophen (TYLENOL) 325 MG CAPS, Take 2 capsules by mouth 2 (two) times a day, Disp: , Rfl:     aspirin-dipyridamole (AGGRENOX)  mg per 12 hr capsule, Take 1 capsule by mouth every 12 (twelve) hours, Disp: , Rfl:     Empagliflozin-Linagliptin (GLYXAMBI) 25-5 MG TABS, Take by mouth, Disp: , Rfl:     ergocalciferol (VITAMIN D2) 50,000 units, Take 1 capsule by mouth once a week, Disp: , Rfl:     gabapentin (NEURONTIN) 300 mg capsule, Take 1 capsule by mouth 3 (three) times a day, Disp: , Rfl:     glucose blood (ACCU-CHEK CAMILLA PLUS) test strip, by In Vitro route 2 (two) times a day, Disp: , Rfl:     metFORMIN (GLUCOPHAGE) 1000 MG tablet, Take 1 tablet (1,000 mg total) by mouth 2 (two) times a day with meals, Disp: 180 tablet, Rfl: 1    No Known Allergies    Objective   Vitals:Blood pressure 121/71, pulse 76, resp  rate 14, height 6' (1 829 m), weight 69 4 kg (153 lb)  ,There is no height or weight on file to calculate BMI  General examination:  Patient is awake and alert  Eyes: Conjunctiva and sclera are clear  HEENT: External examination is normal  Neck: Supple  Lungs: Clear to auscultation bilaterally  CVS: S1, S2 heard  Abdomen: Soft, nontender  Extremities: No clubbing, edema, cyanosis  Skin: No rashes  Neurological examination:   Mental status: Patient is awake, alert, oriented to time place and person  Attention, concentration, fund of knowledge is intact  Language: No evidence of aphasia or dysarthria  Memory: Repetition 3/3 and recall 2/3 and with hinting 3/3    Cranial nerves: Pupils equal, reacting to light and accommodation  Extraocular movements intact  Visual fields are full  Fundi are difficult to visualize bilaterally  Facial sensation is intact  No facial weakness is noted  Finger rub test is intact bilaterally  Tongue and uvula are in midline  Gag is intact  Shoulder shrug is 5/5 bilaterally  Motor examination: Tone is normal  Some atrophy noted in the thighs  Strength is 5/5 in b/l Ues  In LEs bilateral HF, KE and KF are 5-/5  Right DF 3/5 and PF 3/5  In LLE  DF 0/5 and PF 2/5  Sensory examination: Light touch is intact  Pinprick, temperature are impaired till slightly above the ankles bilaterally  Vibration is decreased at the toes bilaterally  Proprioception is intact  Deep tendon reflexes: 0 at the ankles and knees bilaterally and 2 at biceps and triceps bilaterally  Plantars are downgoing bilaterally  Coordination: Finger-nose test and finger tapping intact bilaterally  Heel-to-shin test is intact bilaterally  Gait: Patient is in a wheel chair and walking could not be tested  Lab Results:   "  Recent Results (from the past 4368 hour(s))   Microalbumin / creatinine urine ratio    Collection Time: 08/28/17  7:34 AM   Result Value Ref Range    Creatinine, Ur 132 0 mg/dL    Microalbum  ,U,Random 25 0 (H) 0 0 - 20 0 mg/L    Microalb Creat Ratio 19 0 - 30 mg/g creatinine   Basic metabolic panel    Collection Time: 08/28/17  7:34 AM   Result Value Ref Range    Sodium 137 136 - 145 mmol/L    Potassium 4 5 3 5 - 5 3 mmol/L    Chloride 104 100 - 108 mmol/L    CO2 27 21 - 32 mmol/L    Anion Gap 6 4 - 13 mmol/L    BUN 12 5 - 25 mg/dL    Creatinine 0 73 0 60 - 1 30 mg/dL    Glucose, Fasting 189 (H) 65 - 99 mg/dL    Calcium 9 3 8 3 - 10 1 mg/dL    eGFR 97 ml/min/1 73sq m   Hemoglobin A1c    Collection Time: 08/28/17  7:34 AM   Result Value Ref Range    Hemoglobin A1C 7 0 (H) 4 2 - 6 3 %     mg/dl   Hemoglobin A1c    Collection Time: 11/29/17  7:51 AM   Result Value Ref Range    Hemoglobin A1C 8 3 (H) 4 2 - 6 3 %     mg/dl   Lyme Antibody Profile with reflex to WB    Collection Time: 11/29/17  7:51 AM   Result Value Ref Range    LYME AB IGG 0 21 0 00 - 0 79    LYME AB IGM 0 38 0 00 - 0 79   CBC and differential    Collection Time: 11/29/17  7:51 AM   Result Value Ref Range    WBC 8 97 4 31 - 10 16 Thousand/uL    RBC 4 71 3 88 - 5 62 Million/uL    Hemoglobin 15 0 12 0 - 17 0 g/dL    Hematocrit 43 2 36 5 - 49 3 %    MCV 92 82 - 98 fL    MCH 31 8 26 8 - 34 3 pg    MCHC 34 7 31 4 - 37 4 g/dL    RDW 13 2 11 6 - 15 1 %    MPV 10 2 8 9 - 12 7 fL    Platelets 620 830 - 043 Thousands/uL    nRBC 0 /100 WBCs    Neutrophils Relative 65 43 - 75 %    Lymphocytes Relative 26 14 - 44 %    Monocytes Relative 8 4 - 12 %    Eosinophils Relative 1 0 - 6 %    Basophils Relative 0 0 - 1 %    Neutrophils Absolute 5 91 1 85 - 7 62 Thousands/µL    Lymphocytes Absolute 2 30 0 60 - 4 47 Thousands/µL    Monocytes Absolute 0 68 0 17 - 1 22 Thousand/µL    Eosinophils Absolute 0 05 0 00 - 0 61 Thousand/µL    Basophils Absolute 0 01 0 00 - 0 10 Thousands/µL   Comprehensive metabolic panel    Collection Time: 11/29/17  7:51 AM   Result Value Ref Range    Sodium 139 136 - 145 mmol/L    Potassium 3 6 3 5 - 5 3 mmol/L    Chloride 104 100 - 108 mmol/L    CO2 28 21 - 32 mmol/L    Anion Gap 7 4 - 13 mmol/L    BUN 15 5 - 25 mg/dL    Creatinine 0 71 0 60 - 1 30 mg/dL    Glucose, Fasting 164 (H) 65 - 99 mg/dL    Calcium 9 1 8 3 - 10 1 mg/dL    AST 8 5 - 45 U/L    ALT 16 12 - 78 U/L    Alkaline Phosphatase 46 46 - 116 U/L    Total Protein 7 1 6 4 - 8 2 g/dL    Albumin 3 8 3 5 - 5 0 g/dL    Total Bilirubin 0 58 0 20 - 1 00 mg/dL    eGFR 97 ml/min/1 73sq m   Folate    Collection Time: 11/29/17  7:51 AM   Result Value Ref Range    Folate 15 4 3 1 - 17 5 ng/mL   Sedimentation rate, automated    Collection Time: 11/29/17  7:51 AM   Result Value Ref Range    Sed Rate 6 0 - 10 mm/hour   RPR    Collection Time: 11/29/17 7:51 AM   Result Value Ref Range    RPR Non-Reactive Non-Reactive   Protein electrophoresis, serum    Collection Time: 11/29/17  7:51 AM   Result Value Ref Range    A/G Ratio 1 65 1 10 - 1 80    Albumin Electrophoresis 62 2 52 0 - 65 0 %    Albumin CONC 4 48 3 50 - 5 00 g/dl    Alpha 1 4 4 2 5 - 5 0 %    ALPHA 1 CONC 0 32 0 10 - 0 40 g/dL    Alpha 2 11 8 7 0 - 13 0 %    ALPHA 2 CONC 0 85 0 40 - 1 20 g/dL    Beta-1 6 3 5 0 - 13 0 %    BETA 1 CONC 0 45 0 40 - 0 80 g/dL    Beta-2 4 8 2 0 - 8 0 %    BETA 2 CONC 0 35 0 20 - 0 50 g/dL    Gamma Globulin 10 5 (L) 12 0 - 22 0 %    GAMMA CONC 0 76 0 50 - 1 60 g/dL    SPEP Interpretation       The serum total protein, albumin and electrophoresis are within normal limits  No monoclonal bands noted  Reviewed by: Zheng Trinidad MD ([id number]) **Electronic Signature**    Total Protein 7 2 6 4 - 8 2 g/dL   Zinc    Collection Time: 11/29/17  7:51 AM   Result Value Ref Range    Zinc 99 56 - 134 ug/dL   Vitamin B12    Collection Time: 11/29/17  7:51 AM   Result Value Ref Range    Vitamin B-12 221 100 - 900 pg/mL   Vitamin B6    Collection Time: 11/29/17  7:51 AM   Result Value Ref Range    Vitamin B6 8 2 5 3 - 46 7 ug/L   Vitamin D 25 hydroxy    Collection Time: 11/29/17  7:51 AM   Result Value Ref Range    Vit D, 25-Hydroxy 13 6 (L) 30 0 - 100 0 ng/mL   TSH, 3rd generation with T4 reflex    Collection Time: 11/29/17  7:51 AM   Result Value Ref Range    TSH 3RD GENERATON 1 670 0 358 - 3 740 uIU/mL   DONAVON Screen w/ Reflex to Titer/Pattern    Collection Time: 11/29/17  7:51 AM   Result Value Ref Range    DONAVON Negative Negative   Amino acids, urine, quantitative    Collection Time: 01/22/18  7:54 AM   Result Value Ref Range    a-Aminobutyric Acid, Ur 8 2 0 0 - 34 6 umol/g Cr    a-Aminoadipic acid 53 5 0 0 - 146 7 umol/g Cr    Homocystine, Ur 0 4 0 0 - 2 0 umol/g Cr    Hydroxylysine, Ur 8 6 0 0 - 37 3 umol/g Cr    Phenylalanine,Qn,Ur 33 5 0 0 - 239 0 umol/g Cr    Argininosuccinic, Ur 11 2 0 0 - 51 2 umol/g Cr    Tryptophan,Qn,Ur 48 2 0 0 - 207 5 umol/g Cr    Asparagine,Qn,Ur 92 2 0 0 - 454 2 umol/g Cr    Glutamic acid,Qn,Ur 9 8 0 0 - 92 4 umol/g Cr    Glutamine,Qn,Ur 441 5 0 0 - 1756 2 umol/g Cr    Glycine,Qn,Ur 1021 7 0 0 - 7996 9 umol/g Cr    Alanine(a-Alanine),Qn,Ur 596 5 0 0 - 1337 0 umol/g Cr    Beta-Alanine, Ur 4 9 0 0 - 869 8 umol/g Cr    Beta-Aminoisobutyrate, Ur 22 7 0 0 - 807 9 umol/g Cr    Gamma-Aminobutyrate, Ur 1 5 0 0 - 13 1 umol/g Cr    Homocitrulline, Ur 10 4 0 0 - 80 0 umol/g Cr    Alloisoleucine, Ur 1 0 0 0 - 13 5 umol/g Cr    Leucine,Qn,Ur 19 7 0 0 - 129 1 umol/g Cr    Taurine,Qn,Ur 283 2 0 0 - 5335 7 umol/g Cr    Aspartic acid,Qn,Ur 4 5 0 0 - 86 7 umol/g Cr    Hydroxyproline,Qn,Ur 22 7 0 0 - 87 9 umol/g Cr    Threonine,Qn,Ur 162 9 0 0 - 714 9 umol/g Cr    Serine,Qn,Ur 382 6 0 0 - 1052 8 umol/g Cr    Proline,Qn,Ur 27 8 0 0 - 168 6 umol/g Cr    Citrulline,Qn,Ur 3 3 0 0 - 27 4 umol/g Cr    Valine,Qn,Ur 25 6 0 0 - 147 4 umol/g Cr    Cystine,Qn,Ur 77 8 0 0 - 223 8 umol/g Cr    Methionine,Qn,Ur 6 7 0 0 - 37 1 umol/g Cr    Cystathionine,Qn,Ur 7 1 0 0 - 80 8 umol/g Cr    Isoleucine,Qn,Ur 12 1 0 0 - 48 1 umol/g Cr    Tyrosine,Qn,Ur 39 2 0 0 - 388 9 umol/g Cr    Ornithine,Qn,Ur 19 1 0 0 - 76 3 umol/g Cr    Lysine,Qn,Ur 94 5 0 0 - 1020 6 umol/g Cr    Histidine,Qn,Ur 497 6 0 0 - 2534 2 umol/g Cr    Arginine,Qn,Ur 21 5 0 0 - 69 6 umol/g Cr    Sarcosine,Qn,Ur 2 4 0 0 - 27 3 umol/g Cr    Director Review Comment     AMINO ACID METHODOLOGY Comment     AMINO ACID INTERPRETATION Comment     AMINO ACID, QN URINE Comment    Acetylcholine receptor, binding    Collection Time: 01/22/18  7:54 AM   Result Value Ref Range    AChR Binding Ab, Serum <0 03 0 00 - 0 24 nmol/L   C3 complement    Collection Time: 01/22/18  7:54 AM   Result Value Ref Range    C3 Complement 94 8 90 0 - 180 0 mg/dL   Anti-microsomal antibody    Collection Time: 01/22/18  7:54 AM   Result Value Ref Range    THYROID MICROSOMAL ANTIBODY 14 0 - 34 IU/mL   Immunoglobulin free LT chains blood    Collection Time: 01/22/18  7:54 AM   Result Value Ref Range    Ig Kappa Free Light Chain 16 6 3 3 - 19 4 mg/L    Ig Lambda Free Light Chain 16 6 5 7 - 26 3 mg/L    Kappa/Lambda FluidC Ratio 1 00 0 26 - 1 65   Protein electrophoresis, serum    Collection Time: 01/22/18  7:54 AM   Result Value Ref Range    A/G Ratio 1 60 1 10 - 1 80    Albumin Electrophoresis 61 6 52 0 - 65 0 %    Albumin CONC 4 25 3 50 - 5 00 g/dl    Alpha 1 4 3 2 5 - 5 0 %    ALPHA 1 CONC 0 30 0 10 - 0 40 g/dL    Alpha 2 11 5 7 0 - 13 0 %    ALPHA 2 CONC 0 79 0 40 - 1 20 g/dL    Beta-1 6 1 5 0 - 13 0 %    BETA 1 CONC 0 42 0 40 - 0 80 g/dL    Beta-2 4 9 2 0 - 8 0 %    BETA 2 CONC 0 34 0 20 - 0 50 g/dL    Gamma Globulin 11 6 (L) 12 0 - 22 0 %    GAMMA CONC 0 80 0 50 - 1 60 g/dL    SPEP Interpretation       The serum total protein, albumin and electrophoresis are within normal limits  No monoclonal bands noted  Reviewed by: Shantal Rowland MD ([id number]) **Electronic Signature**    Total Protein 6 9 6 4 - 8 2 g/dL   Sodium, urine, 24 hour    Collection Time: 01/24/18  7:54 AM   Result Value Ref Range    Sodium, 24H Ur 155 40 - 220 mmol/24 hrs    24H Urine Volume 1,550 mL    PERIOD 24 Hours   Creatinine, urine, 24 hour    Collection Time: 01/24/18  7:54 AM   Result Value Ref Range    Creatinine, 24H Ur 0 7 (L) 0 8 - 1 8 g/24Hr    TOTAL URINE VOLUME 1,550 ml   APTT    Collection Time: 01/25/18 12:21 PM   Result Value Ref Range    PTT 26 23 - 35 seconds   Platelet count    Collection Time: 01/25/18 12:21 PM   Result Value Ref Range    Platelets 895 574 - 752 Thousands/uL    MPV 9 7 8 9 - 12 7 fL   Protime-INR    Collection Time: 01/25/18 12:21 PM   Result Value Ref Range    Protime 12 4 12 1 - 14 4 seconds    INR 0 92 0 86 - 1 16   Glucose, CSF    Collection Time: 01/25/18  3:28 PM   Result Value Ref Range    Glucose, CSF 50 50 - 80 mg/dL   RBC count,CSF    Collection Time: 01/25/18  3:28 PM   Result Value Ref Range    RBC, CSF 3 0 - 10 uL   CSF white cell count with differential    Collection Time: 01/25/18  3:28 PM   Result Value Ref Range    Appearance, CSF clear     Tube Number, CSF 4     WBC, CSF 2 0 - 5 /uL    Xanthochromia No No   Total Protein, CSF    Collection Time: 01/25/18  3:28 PM   Result Value Ref Range    Protein, CSF 53 (H) 15 - 45 mg/dL   CSF VDRL    Collection Time: 01/25/18  3:28 PM   Result Value Ref Range    VDRL, CSF Non Reactive Non [place]:<1:1   Angiotensin converting enzyme, CSF    Collection Time: 01/25/18  3:28 PM   Result Value Ref Range    Angio Convert Enzyme, CSF 1 3 0 0 - 2 5 U/L   MS Panel, CSF/Serum    Collection Time: 01/25/18  3:28 PM   Result Value Ref Range    IgG, CSF 2 9 0 0 - 8 6 mg/dL    CSF ALBUMIN 26 11 - 48 mg/dL    Serum Albumin 4 5 3 6 - 4 8 g/dL    CSF/SERUM ALBUMIN ALB  INDEX 6 0 - 8    IGG/ALBUMIN RATIO 0 11 0 00 - 0 25    IgG Index, CSF 0 6 0 0 - 0 7    IgG Synthetic Rate 1 1 -9 9 TO +3 3 mg/day    Myelin Basic Prot, CSF 2 5 (H) 0 0 - 1 2 ng/mL    Oligo Bands Comment     IgG 804 700 - 1600 mg/dL   IGG, CSF    Collection Time: 01/25/18  3:28 PM   Result Value Ref Range    IgG, CSF 3 0 0 0 - 8 6 mg/dL   Leukemia/Lymphoma flow cytometry    Collection Time: 01/25/18  3:28 PM   Result Value Ref Range    Scan Result SEE WRITTEN REPORT FROM [facility]    Non-gynecologic cytology    Collection Time: 01/25/18  3:28 PM   Result Value Ref Range    Case Report       Non-gynecologic Cytology                          Case: [id number]                                  Authorizing Provider:  Cira Luong MD       Collected:           01/25/2018 1528              Ordering Location:     [address]      Received:            01/25/2018 [address] Fluoroscopy                                                         Pathologist:           Dolores Braga MD                                                        Specimen:    Lumbar Puncture                                                                            Final Diagnosis       A  Cerebrospinal fluid (CSF):  Negative for malignancy  Few lymphocytes and an occasional monocyte  Few red blood cells  Satisfactory for evaluation  Note       Interpretation performed at [facility], [address] [facility] [address]      Gross Description       A  Lumbar Puncture, : 1cc, colorless, clear  Additional Information       Hologic's FDA approved ,  and ThinPrep Imaging System are utilized with strict adherence to the 's instruction manual to prepare gynecologic and non-gynecologic cytology specimens for the production of ThinPrep slides as well as for gynecologic ThinPrep imaging  These processes have been validated by our laboratory and/or by the   These tests were developed and their performance characteristics determined by [facility] or [facility]  They may not be cleared or approved by the [place]  Food and Drug Administration  The FDA has determined that such clearance or approval is not necessary  These tests are used for clinical purposes  They should not be regarded as investigational or for research  This laboratory has been approved by [facility], designated as a high-complexity laboratory and is qualified to perform these tests         CSF culture and Gram stain    Collection Time: 01/25/18  3:28 PM   Result Value Ref Range    CSF Culture No growth    Varicella Zoster Virus DNA,PCR    Collection Time: 01/25/18  3:28 PM   Result Value Ref Range    VZV Real Time PCR Negative Negative   Lyme disease, PCR    Collection Time: 01/25/18  3:28 PM   Result Value Ref Range    Lyme Disease(B burgdorferi)PCR Negative Negative   CSF Diff    Collection Time: 01/25/18  3:28 PM   Result Value Ref Range    Total Counted 35     Lymphs % CSF 80 %    Monocytes % (CSF) 20 %    Pathology Review Yes (AA) (none)   Path Slide Review    Collection Time: 01/25/18  3:28 PM   Result Value Ref Range    Path Review       Lymphocytes, monocytes and a rare ependymal cell  Dr Robb Veronica, 01/26/18   Protein electrophoresis, serum    Collection Time: 01/25/18  6:11 PM   Result Value Ref Range    A/G Ratio 1 61 1 10 - 1 80    Albumin Electrophoresis 61 7 52 0 - 65 0 %    Albumin CONC 4 75 3 50 - 5 00 g/dl    Alpha 1 4 1 2 5 - 5 0 %    ALPHA 1 CONC 0 32 0 10 - 0 40 g/dL    Alpha 2 11 4 7 0 - 13 0 %    ALPHA 2 CONC 0 88 0 40 - 1 20 g/dL    Beta-1 6 3 5 0 - 13 0 %    BETA 1 CONC 0 49 0 40 - 0 80 g/dL    Beta-2 5 0 2 0 - 8 0 %    BETA 2 CONC 0 39 0 20 - 0 50 g/dL    Gamma Globulin 11 5 (L) 12 0 - 22 0 %    GAMMA CONC 0 89 0 50 - 1 60 g/dL    SPEP Interpretation       The serum total protein, albumin and electrophoresis are within normal limits  No monoclonal bands noted  Reviewed by:  Rob Chang MD ([id number]) **Electronic Signature**    Total Protein 7 7 6 4 - 8 2 g/dL   '  Imaging Studies: I have personally reviewed pertinent reports          Counseling / Coordination of Care  I spent 80 minutes with the patient and greater than 50% of the time was spent in coordination of care and counselling

## 2018-02-13 ENCOUNTER — OFFICE VISIT (OUTPATIENT)
Dept: NEUROLOGY | Facility: CLINIC | Age: 68
End: 2018-02-13
Payer: COMMERCIAL

## 2018-02-13 VITALS
HEART RATE: 76 BPM | RESPIRATION RATE: 14 BRPM | SYSTOLIC BLOOD PRESSURE: 121 MMHG | BODY MASS INDEX: 20.72 KG/M2 | WEIGHT: 153 LBS | DIASTOLIC BLOOD PRESSURE: 71 MMHG | HEIGHT: 72 IN

## 2018-02-13 DIAGNOSIS — R26.2 AMBULATORY DYSFUNCTION: ICD-10-CM

## 2018-02-13 DIAGNOSIS — G62.9 NEUROPATHY: ICD-10-CM

## 2018-02-13 DIAGNOSIS — M21.372 FOOT DROP, BILATERAL: Primary | ICD-10-CM

## 2018-02-13 DIAGNOSIS — M21.371 FOOT DROP, BILATERAL: Primary | ICD-10-CM

## 2018-02-13 DIAGNOSIS — E53.8 VITAMIN B12 DEFICIENCY: ICD-10-CM

## 2018-02-13 DIAGNOSIS — M54.16 LUMBAR RADICULOPATHY: ICD-10-CM

## 2018-02-13 PROBLEM — R79.89 LOW VITAMIN B12 LEVEL: Status: ACTIVE | Noted: 2018-02-13

## 2018-02-13 PROCEDURE — 96372 THER/PROPH/DIAG INJ SC/IM: CPT | Performed by: PSYCHIATRY & NEUROLOGY

## 2018-02-13 PROCEDURE — 99215 OFFICE O/P EST HI 40 MIN: CPT | Performed by: PSYCHIATRY & NEUROLOGY

## 2018-02-13 PROCEDURE — 99354 PR PROLONGED SVC OUTPATIENT SETTING 1ST HOUR: CPT | Performed by: PSYCHIATRY & NEUROLOGY

## 2018-02-13 RX ORDER — CYANOCOBALAMIN 1000 UG/ML
1000 INJECTION INTRAMUSCULAR; SUBCUTANEOUS ONCE
Status: COMPLETED | OUTPATIENT
Start: 2018-02-13 | End: 2018-02-13

## 2018-02-13 RX ORDER — CYANOCOBALAMIN 1000 UG/ML
1000 INJECTION INTRAMUSCULAR; SUBCUTANEOUS
Status: DISCONTINUED | OUTPATIENT
Start: 2018-02-13 | End: 2018-02-13

## 2018-02-13 RX ADMIN — CYANOCOBALAMIN 1000 MCG: 1000 INJECTION INTRAMUSCULAR; SUBCUTANEOUS at 12:46

## 2018-02-14 ENCOUNTER — TELEPHONE (OUTPATIENT)
Dept: NEUROLOGY | Facility: CLINIC | Age: 68
End: 2018-02-14

## 2018-02-14 DIAGNOSIS — E53.8 VITAMIN B 12 DEFICIENCY: Primary | ICD-10-CM

## 2018-02-14 RX ORDER — CYANOCOBALAMIN 1000 UG/ML
1000 INJECTION INTRAMUSCULAR; SUBCUTANEOUS WEEKLY
Qty: 1 ML | Refills: 2 | Status: SHIPPED | OUTPATIENT
Start: 2018-02-14 | End: 2018-12-20 | Stop reason: ALTCHOICE

## 2018-02-14 NOTE — TELEPHONE ENCOUNTER
Pt's wife called stated that PCP will not order the B12, they are happy to do the injections at the office, but requesting that you send a script to the pharmacy for the b12  Pharmacy on file confirmed

## 2018-02-20 ENCOUNTER — CLINICAL SUPPORT (OUTPATIENT)
Dept: FAMILY MEDICINE CLINIC | Facility: CLINIC | Age: 68
End: 2018-02-20
Payer: COMMERCIAL

## 2018-02-20 DIAGNOSIS — E53.8 VITAMIN B12 DEFICIENCY: Primary | ICD-10-CM

## 2018-02-20 PROCEDURE — 96372 THER/PROPH/DIAG INJ SC/IM: CPT | Performed by: FAMILY MEDICINE

## 2018-02-20 RX ORDER — CYANOCOBALAMIN 1000 UG/ML
1000 INJECTION INTRAMUSCULAR; SUBCUTANEOUS
Status: DISCONTINUED | OUTPATIENT
Start: 2018-02-20 | End: 2021-10-11

## 2018-02-20 RX ADMIN — CYANOCOBALAMIN 1000 MCG: 1000 INJECTION INTRAMUSCULAR; SUBCUTANEOUS at 14:00

## 2018-02-22 ENCOUNTER — TELEPHONE (OUTPATIENT)
Dept: NEUROLOGY | Facility: CLINIC | Age: 68
End: 2018-02-22

## 2018-02-22 ENCOUNTER — EVALUATION (OUTPATIENT)
Dept: PHYSICAL THERAPY | Facility: CLINIC | Age: 68
End: 2018-02-22
Payer: COMMERCIAL

## 2018-02-22 DIAGNOSIS — M21.371 FOOT DROP, BILATERAL: ICD-10-CM

## 2018-02-22 DIAGNOSIS — R26.2 AMBULATORY DYSFUNCTION: Primary | ICD-10-CM

## 2018-02-22 DIAGNOSIS — G62.9 NEUROPATHY: ICD-10-CM

## 2018-02-22 DIAGNOSIS — M21.372 FOOT DROP, BILATERAL: ICD-10-CM

## 2018-02-22 PROCEDURE — G8978 MOBILITY CURRENT STATUS: HCPCS | Performed by: PHYSICAL THERAPIST

## 2018-02-22 PROCEDURE — G8979 MOBILITY GOAL STATUS: HCPCS | Performed by: PHYSICAL THERAPIST

## 2018-02-22 PROCEDURE — 97163 PT EVAL HIGH COMPLEX 45 MIN: CPT | Performed by: PHYSICAL THERAPIST

## 2018-02-22 NOTE — TELEPHONE ENCOUNTER
Received call from Betty Hyde with [facility]  Patient would have a 20% coinsurance for home infusion for IVGG  In the note, it looks like he did not want to go an infusion center  Please advise as this would be a substantial bill for the patient

## 2018-02-22 NOTE — PROGRESS NOTES
PT Evaluation     Today's date: 2018  Patient name: Anthony Hartley  :   MRN: [id number]  Referring provider: Avtar Florentino MD  Dx:   Encounter Diagnosis     ICD-10-CM    1  Ambulatory dysfunction R26 2 Ambulatory referral to Physical Therapy   2  Foot drop, bilateral M21 371 Ambulatory referral to Physical Therapy    M21 372    3  Neuropathy G62 9 Ambulatory referral to Physical Therapy                  Assessment    Assessment details:   CURRENT FUNCTIONAL STATUS    Standing/ADL tolerance 10 minutes with a SPC  Walking tolerance 75 feet with a SPC  Ascends stairs step by step/descends stairs step by step with a SPC  Difficulty arising from sitting: Moderate use of arms  Assistance of one for showering  SHORT TERM GOALS (2 WEEKS)    Increase LE AROM  5 degrees in all limited areas  Increase LE strength 3-5 lbs in all weak areas  TUG Score: 15 seconds, moderate fall risk  SLS R/L: 3/3 seconds  Standing/ADL tolerance 20 minutes with a SPC  Walking tolerance 150 feet with a SPC  Ascends stairs step by step/descends stairs step by step with a SPC  Difficulty arising from sitting: Mild use of arms  Assistance of one for showering  LONG TERM GOALS (DISCHARGE)    B Hip AROM: WFL  B Hip Strength: F=24 lbs, ABD=25 lbs  B Knee AROM: WFL  B Knee Strength: E=40 lbs, F=30 lbs  B Ankle AROM: DF=0 deg, PF/IV/EV WFL  B Ankle Strength: DF=15 lbs, PF=30 lbs, IV=20 lbs, EV=15 lbs  TUG Score: 12 seconds, low fall risk  SLS R/L: 5/5 seconds  Standing/ADL tolerance 30 minutes with a SPC  Walking tolerance 300 feet with a SPC  Ascends stairs reciprocally/descends stairs step by step with a SPC  Difficulty arising from sitting: Minimal/no use of arms  Independent showering  Prognosis: fair    Goals  See assessment details above        Plan  Planned therapy interventions: therapeutic exercise, therapeutic activities, neuromuscular re-education, balance and gait training  Frequency: 2x week  Duration in weeks: 4  Plan details: Manuela Jackson is a 79y o  year old male presenting to PT with pain, decreased range of motion, decreased strength, poor balance, and decreased tolerance to activity  This patient would benefit from skilled PT services to address these issues and to maximize function  Thank you for the referral           Subjective Evaluation    History of Present Illness  Mechanism of injury: CC: Pain and weakness in both legs, difficulty walking with multiple falls  HPI: The patient has had chronic bilateral leg pain and weakness secondary to neuropathy  Last year he developed severe weakness in both legs, causing him to fall repeatedly  He had to use a wheelchair for a period of time, and received home therapy  He now uses a walker or SPC, and has not had any falls in the past 2 years  He lives alone in a ranch home  He has an aide help him with showering and housework 3 days a week  His daughter helps with shopping  Pain  Current pain ratin  At best pain ratin  At worst pain ratin  Progression: no change    Patient Goals  Patient goals for therapy: decreased pain, improved balance, increased motion, increased strength and independence with ADLs/IADLs          Objective     Ambulation     Observational Gait     Additional Observational Gait Details  Ambulates with a SPC on the right side  He is able to heel strike on both legs  Comments   CURRENT OBJECTIVE MEASUREMENTS    B Hip AROM: WFL  Hip Strength R/L: F=22/18 lbs, ABD=15/23 lbs  B Knee AROM: WFL  Knee Strength R/L: E=11/34 lbs, F=30/24 lbs  Ankle AROM R/L: DF=-30/-20 deg, PF/IV/EV WFL  Ankle Strength R/L: DF=7/12 lbs, PF=25/28 lbs, IV=8/19 lbs, EV=6/10 lbs  TUG Score: 22 seconds, high fall risk  SLS R/L: 1/1 seconds               Precautions: None    Daily Treatment Diary     Manual                       Exercise Diary          Balance: EOWB         Balance: EONB         Balance: ECWB         Balance: ECNB         SLS         Hurdles Forward         Hurdles Sideways         Cone Reach         Ball Toss         Side Stepping         Carioca         Heel Toe Gait         STS         Steps Forward         Steps Lateral         Hip Patterns         Mini Squats         Ball Squeeze         T-Band ABD         T-Band Hamstrings         T-Band TKE         LAQ         NuStep: S , A                      Modalities                             Flowsheet Rows    Flowsheet Row Most Recent Value   PT/OT G-Codes   Current Score  -- [PSFS 8/30]   Projected Score  -- [PSFS 21/30]   FOTO information reviewed  N/A   Assessment Type  Evaluation   G code set  Mobility: Walking & Moving Around   Mobility: Walking and Moving Around Current Status ()  CL   Mobility: Walking and Moving Around Goal Status ()  CJ

## 2018-02-22 NOTE — TELEPHONE ENCOUNTER
Will it be covered if the patient goes to an infusion center? Please inform the patient about the 20% co-pay with home infusions and his options if he goes to the infusion center and have the patient decided if he wants to proceed or hold off   Tnx

## 2018-02-27 ENCOUNTER — CLINICAL SUPPORT (OUTPATIENT)
Dept: FAMILY MEDICINE CLINIC | Facility: CLINIC | Age: 68
End: 2018-02-27
Payer: COMMERCIAL

## 2018-02-27 ENCOUNTER — OFFICE VISIT (OUTPATIENT)
Dept: PHYSICAL THERAPY | Facility: CLINIC | Age: 68
End: 2018-02-27
Payer: COMMERCIAL

## 2018-02-27 DIAGNOSIS — R26.2 AMBULATORY DYSFUNCTION: Primary | ICD-10-CM

## 2018-02-27 DIAGNOSIS — M21.372 FOOT DROP, BILATERAL: ICD-10-CM

## 2018-02-27 DIAGNOSIS — G62.9 NEUROPATHY: ICD-10-CM

## 2018-02-27 DIAGNOSIS — D64.9 ANEMIA, UNSPECIFIED TYPE: Primary | ICD-10-CM

## 2018-02-27 DIAGNOSIS — M21.371 FOOT DROP, BILATERAL: ICD-10-CM

## 2018-02-27 PROCEDURE — 97110 THERAPEUTIC EXERCISES: CPT | Performed by: PHYSICAL THERAPIST

## 2018-02-27 PROCEDURE — 96372 THER/PROPH/DIAG INJ SC/IM: CPT

## 2018-02-27 RX ORDER — CYANOCOBALAMIN 1000 UG/ML
1000 INJECTION INTRAMUSCULAR; SUBCUTANEOUS
Status: DISCONTINUED | OUTPATIENT
Start: 2018-02-27 | End: 2021-10-11

## 2018-02-27 RX ADMIN — CYANOCOBALAMIN 1000 MCG: 1000 INJECTION INTRAMUSCULAR; SUBCUTANEOUS at 08:06

## 2018-02-27 NOTE — TELEPHONE ENCOUNTER
Called and spoke to patient and advised him of the below  Pt states he is not interested in IVGG at this time

## 2018-02-27 NOTE — PROGRESS NOTES
Daily Note     Today's date: 2018  Patient name: Doron Medley  : [birth year]  MRN: [id number]  Referring provider: Genevieve Shields MD  Dx: No diagnosis found  Subjective: Patient walked some today and his legs are burning  Objective: See treatment diary below      Assessment: Tolerated treatment well  Patient demonstrated fatigue post treatment      Plan: Progress treatment as tolerated        Precautions: None    Daily Treatment Diary     Manual          PROM         Hamstring Stretch         Calf Stretch                      Exercise Diary          QS         SLR         SAQ         Heel slides with Strap         T-Band Press         T-Band B Ankles all planes L 2 2/10        T-Band TKE         LAQ         Mini Squats         Steps Forward         Steps Lateral         PYR HAM: S10  P 6 , C P 5 3/10        PYR QUAD: S4   P 6 , C 1 P 1 3/10        Leg Press: S 11 P 2 3/10        XO TKE         Hack Squat         SLS         NuStep: S12 , A12 L 4 10'        Bike: S         Treadmill                      Modalities          CP/IFC

## 2018-03-01 ENCOUNTER — OFFICE VISIT (OUTPATIENT)
Dept: PHYSICAL THERAPY | Facility: CLINIC | Age: 68
End: 2018-03-01
Payer: COMMERCIAL

## 2018-03-01 ENCOUNTER — PATIENT OUTREACH (OUTPATIENT)
Dept: FAMILY MEDICINE CLINIC | Facility: CLINIC | Age: 68
End: 2018-03-01

## 2018-03-01 DIAGNOSIS — R26.2 AMBULATORY DYSFUNCTION: Primary | ICD-10-CM

## 2018-03-01 PROCEDURE — 97110 THERAPEUTIC EXERCISES: CPT

## 2018-03-01 NOTE — PROGRESS NOTES
The reason for this call is to discuss outreach for follow-up/needed services   Patient says he is having a good day  continues physical therapy  Pt would like pcp to know gabapentin helps and asks that he increase to maximum dose if already prescribed it  Render Gloss takes meds as prescribed  attends all appts as scheduled   discussed with pt  no further needs at this time   will close case  Pt has contact number and knows can call pcp to contact CM needs change

## 2018-03-01 NOTE — PROGRESS NOTES
Daily Note     Today's date: 3/1/2018  Patient name: Marisa Castellanos  :   MRN: [id number]  Referring provider: David Gomez MD  Dx:   Encounter Diagnosis     ICD-10-CM    1  Ambulatory dysfunction R26 2                   Subjective: Patient reports he experienced mild muscle soreness BLE post last session  Objective: See treatment diary below      Assessment: Tolerated treatment well  Patient exhibited good technique with therapeutic exercises      Plan: Progress treatment as tolerated        Precautions: None     Daily Treatment Diary      Manual  2/27  3/1           PROM               Hamstring Stretch               Calf Stretch                                     Exercise Diary                QS               SLR               SAQ               Heel slides with Strap               T-Band Press               T-Band B Ankles all planes L 2 2/10  L 2 2X10           T-Band TKE               LAQ               Mini Squats               Steps Forward               Steps Lateral               PYR HAM: S10  P 6 , C P 5 3/10  P 5 3X10           PYR QUAD: S4   P 6 , C 1 P 1 3/10  P 1 3X10           Leg Press: S 11 P 2 3/10  P 2 3X10           XO TKE    P 3 2X10           Hack Squat               SLS               NuStep: S12 , A12 L 4 10'  L 4 10'           Bike: S               Treadmill               Hip Patterns    2# 2X10                 Modalities                CP/IFC

## 2018-03-06 ENCOUNTER — APPOINTMENT (OUTPATIENT)
Dept: PHYSICAL THERAPY | Facility: CLINIC | Age: 68
End: 2018-03-06
Payer: COMMERCIAL

## 2018-03-06 ENCOUNTER — CLINICAL SUPPORT (OUTPATIENT)
Dept: FAMILY MEDICINE CLINIC | Facility: CLINIC | Age: 68
End: 2018-03-06
Payer: COMMERCIAL

## 2018-03-06 DIAGNOSIS — G60.9 IDIOPATHIC PERIPHERAL NEUROPATHY: Primary | ICD-10-CM

## 2018-03-06 DIAGNOSIS — D64.9 ANEMIA, UNSPECIFIED TYPE: Primary | ICD-10-CM

## 2018-03-06 PROCEDURE — 96372 THER/PROPH/DIAG INJ SC/IM: CPT | Performed by: FAMILY MEDICINE

## 2018-03-06 RX ORDER — CYANOCOBALAMIN 1000 UG/ML
1000 INJECTION INTRAMUSCULAR; SUBCUTANEOUS
Status: DISCONTINUED | OUTPATIENT
Start: 2018-03-06 | End: 2021-10-11

## 2018-03-06 RX ORDER — GABAPENTIN 300 MG/1
300 CAPSULE ORAL 3 TIMES DAILY
Qty: 90 CAPSULE | Refills: 0 | Status: SHIPPED | OUTPATIENT
Start: 2018-03-06 | End: 2018-04-07 | Stop reason: SDUPTHER

## 2018-03-06 RX ADMIN — CYANOCOBALAMIN 1000 MCG: 1000 INJECTION INTRAMUSCULAR; SUBCUTANEOUS at 08:00

## 2018-03-09 ENCOUNTER — APPOINTMENT (OUTPATIENT)
Dept: PHYSICAL THERAPY | Facility: CLINIC | Age: 68
End: 2018-03-09
Payer: COMMERCIAL

## 2018-03-14 ENCOUNTER — OFFICE VISIT (OUTPATIENT)
Dept: PHYSICAL THERAPY | Facility: CLINIC | Age: 68
End: 2018-03-14
Payer: COMMERCIAL

## 2018-03-14 DIAGNOSIS — M21.371 FOOT DROP, BILATERAL: ICD-10-CM

## 2018-03-14 DIAGNOSIS — R26.2 AMBULATORY DYSFUNCTION: Primary | ICD-10-CM

## 2018-03-14 DIAGNOSIS — M21.372 FOOT DROP, BILATERAL: ICD-10-CM

## 2018-03-14 DIAGNOSIS — G62.9 NEUROPATHY: ICD-10-CM

## 2018-03-14 PROCEDURE — 97110 THERAPEUTIC EXERCISES: CPT | Performed by: PHYSICAL THERAPIST

## 2018-03-14 NOTE — PROGRESS NOTES
Daily Note     Today's date: 3/14/2018  Patient name: Td Lamar  :   MRN: [id number]  Referring provider: Rosa Maria Avalos MD  Dx:   Encounter Diagnosis     ICD-10-CM    1  Ambulatory dysfunction R26 2    2  Foot drop, bilateral M21 371     M21 372    3  Neuropathy G62 9                   Subjective: Patient was able to go grocery shopping for the first time this past weekend  He is also cooking his own meals, but he still needs help showering  Objective: See treatment diary below      Assessment: Tolerated treatment well  Patient demonstrated fatigue post treatment      Plan: Progress treatment as tolerated        Precautions: None     Daily Treatment Diary      Manual  2/27  3/1  3/14                               Exercise Diary                QS               SLR               SAQ               T-Band B Ankles all planes L 2 2/10  L 2 2X10  L 2 2/10         T-Band Sideways walk parallel bars      L 2  10x         LAQ               Mini Squats      30x         Steps Forward               Steps Lateral      6" 30x         PYR HAM: S10  P 6 , C P 5 3/10  P 5 3X10  P 5 3/10         PYR QUAD: S4   P 6 , C 1 P 1 3/10  P 1 3X10  P 1 3/10         Leg Press: S 11 P 2 3/10  P 2 3X10  P 2 3/10         XO TKE    P 3 2X10  P 3 3/10         SLS               NuStep: S12 , A12 L 4 10'  L 4 10'  L 4 10'         Hip Patterns    2# 2X10  2# 3/10               Modalities

## 2018-03-16 ENCOUNTER — OFFICE VISIT (OUTPATIENT)
Dept: PHYSICAL THERAPY | Facility: CLINIC | Age: 68
End: 2018-03-16
Payer: COMMERCIAL

## 2018-03-16 DIAGNOSIS — R26.2 AMBULATORY DYSFUNCTION: Primary | ICD-10-CM

## 2018-03-16 DIAGNOSIS — M21.372 FOOT DROP, BILATERAL: ICD-10-CM

## 2018-03-16 DIAGNOSIS — M21.371 FOOT DROP, BILATERAL: ICD-10-CM

## 2018-03-16 DIAGNOSIS — G62.9 NEUROPATHY: ICD-10-CM

## 2018-03-16 PROCEDURE — 97110 THERAPEUTIC EXERCISES: CPT | Performed by: PHYSICAL THERAPIST

## 2018-03-16 NOTE — PROGRESS NOTES
Daily Note     Today's date: 3/16/2018  Patient name: Lamar Lopez  :   MRN: [id number]  Referring provider: Leandro Matias MD  Dx:   Encounter Diagnosis     ICD-10-CM    1  Ambulatory dysfunction R26 2    2  Foot drop, bilateral M21 371     M21 372    3  Neuropathy G62 9                   Subjective: Patient feels more fatigued today  Objective: See treatment diary below      Assessment: Tolerated treatment fair  Patient demonstrated fatigue post treatment      Plan: Progress treatment as tolerated        Precautions: None     Daily Treatment Diary      Manual  2/27  3/1  3/14  3/16                             Exercise Diary                QS               SLR               SAQ               T-Band B Ankles all planes L 2 2/10  L 2 2X10  L 2 2/10         T-Band Sideways walk parallel bars      L 2  10x         LAQ               Mini Squats      30x         Steps Forward               Steps Lateral      6" 30x  6" 30x       PYR HAM: S10  P 6 , C P 5 3/10  P 5 3X10  P 5 3/10  P 6 3/10       PYR QUAD: S4   P 6 , C 1 P 1 3/10  P 1 3X10  P 1 3/10  P 1 3/10       Leg Press: S 11 P 2 3/10  P 2 3X10  P 2 3/10  P 3 3/10       XO TKE    P 3 2X10  P 3 3/10  P 4 3/10       SLS               NuStep: S12 , A12 L 4 10'  L 4 10'  L 4 10'  L 4 10'       Hip Patterns    2# 2X10  2# 3/10               Modalities

## 2018-03-19 ENCOUNTER — APPOINTMENT (OUTPATIENT)
Dept: LAB | Facility: MEDICAL CENTER | Age: 68
End: 2018-03-19
Payer: COMMERCIAL

## 2018-03-19 ENCOUNTER — TRANSCRIBE ORDERS (OUTPATIENT)
Dept: LAB | Facility: MEDICAL CENTER | Age: 68
End: 2018-03-19

## 2018-03-19 DIAGNOSIS — M21.372 BILATERAL FOOT-DROP: ICD-10-CM

## 2018-03-19 DIAGNOSIS — M21.372 FOOT DROP, BILATERAL: ICD-10-CM

## 2018-03-19 DIAGNOSIS — M21.371 FOOT DROP, BILATERAL: ICD-10-CM

## 2018-03-19 DIAGNOSIS — R26.2 AMBULATORY DYSFUNCTION: ICD-10-CM

## 2018-03-19 DIAGNOSIS — M21.371 BILATERAL FOOT-DROP: ICD-10-CM

## 2018-03-19 DIAGNOSIS — E53.8 VITAMIN B12 DEFICIENCY: ICD-10-CM

## 2018-03-19 DIAGNOSIS — M21.379 FOOT-DROP, UNSPECIFIED LATERALITY: Primary | ICD-10-CM

## 2018-03-19 DIAGNOSIS — G62.9 NEUROPATHY: ICD-10-CM

## 2018-03-19 LAB
BUN SERPL-MCNC: 14 MG/DL (ref 5–25)
CREAT SERPL-MCNC: 0.67 MG/DL (ref 0.6–1.3)
GFR SERPL CREATININE-BSD FRML MDRD: 99 ML/MIN/1.73SQ M
IGA SERPL-MCNC: 225 MG/DL (ref 70–400)
VIT B12 SERPL-MCNC: 386 PG/ML (ref 100–900)

## 2018-03-19 PROCEDURE — 82565 ASSAY OF CREATININE: CPT

## 2018-03-19 PROCEDURE — 82784 ASSAY IGA/IGD/IGG/IGM EACH: CPT

## 2018-03-19 PROCEDURE — 82607 VITAMIN B-12: CPT

## 2018-03-19 PROCEDURE — 86255 FLUORESCENT ANTIBODY SCREEN: CPT

## 2018-03-19 PROCEDURE — 84520 ASSAY OF UREA NITROGEN: CPT

## 2018-03-19 PROCEDURE — 36415 COLL VENOUS BLD VENIPUNCTURE: CPT

## 2018-03-20 ENCOUNTER — TELEPHONE (OUTPATIENT)
Dept: NEUROLOGY | Facility: CLINIC | Age: 68
End: 2018-03-20

## 2018-03-20 NOTE — TELEPHONE ENCOUNTER
----- Message from Cheyenne Randle MD sent at 3/20/2018  8:39 AM EDT -----  Please inform the patient about B 12 level being 386  Recommend over the counter B 12 pills 500 mcg daily

## 2018-03-21 ENCOUNTER — APPOINTMENT (OUTPATIENT)
Dept: PHYSICAL THERAPY | Facility: CLINIC | Age: 68
End: 2018-03-21
Payer: COMMERCIAL

## 2018-03-22 LAB
HU1 AB TITR SER: NORMAL TITER
HU2 AB TITR SER IF: NORMAL TITER

## 2018-03-23 ENCOUNTER — OFFICE VISIT (OUTPATIENT)
Dept: PHYSICAL THERAPY | Facility: CLINIC | Age: 68
End: 2018-03-23
Payer: COMMERCIAL

## 2018-03-23 DIAGNOSIS — M21.371 FOOT DROP, BILATERAL: ICD-10-CM

## 2018-03-23 DIAGNOSIS — M21.372 FOOT DROP, BILATERAL: ICD-10-CM

## 2018-03-23 DIAGNOSIS — G62.9 NEUROPATHY: ICD-10-CM

## 2018-03-23 DIAGNOSIS — R26.2 AMBULATORY DYSFUNCTION: Primary | ICD-10-CM

## 2018-03-23 PROCEDURE — 97110 THERAPEUTIC EXERCISES: CPT | Performed by: PHYSICAL THERAPIST

## 2018-03-23 NOTE — PROGRESS NOTES
Daily Note     Today's date: 3/23/2018  Patient name: Sommer Gil  :   MRN: [id number]  Referring provider: Awilda Gan MD  Dx:   Encounter Diagnosis     ICD-10-CM    1  Ambulatory dysfunction R26 2    2  Foot drop, bilateral M21 371     M21 372    3  Neuropathy G62 9                   Subjective: Patient is feeling stronger in his legs  Objective: See treatment diary below      Assessment: Tolerated treatment well  Patient demonstrated fatigue post treatment      Plan: Progress note during next visit       Precautions: None     Daily Treatment Diary      Manual  2/27  3/1  3/14  3/16  3/23                           Exercise Diary                QS               SLR               SAQ               T-Band B Ankles all planes L 2 2/10  L 2 2X10  L 2 2/10    L 2 2/10     T-Band Sideways walk parallel bars      L 2  10x    L 2 10x     LAQ               Mini Squats      30x    30x     Steps Forward               Steps Lateral      6" 30x  6" 30x  6" 30x     PYR HAM: S10  P 6 , C P 5 3/10  P 5 3X10  P 5 3/10  P 6 3/10  P 6 3/10     PYR QUAD: S4   P 6 , C 1 P 1 3/10  P 1 3X10  P 1 3/10  P 1 3/10  P 1 3/10     Leg Press: S 11 P 2 3/10  P 2 3X10  P 2 3/10  P 3 3/10  P 3 3/10     XO TKE    P 3 2X10  P 3 3/10  P 4 3/10  P 4 3/10     SLS               NuStep: S12 , A12 L 4 10'  L 4 10'  L 4 10'  L 4 10'  L 4 10'     Hip Patterns    2# 2X10  2# 3/10    2# 2/10           Modalities

## 2018-03-26 ENCOUNTER — OFFICE VISIT (OUTPATIENT)
Dept: PHYSICAL THERAPY | Facility: CLINIC | Age: 68
End: 2018-03-26
Payer: COMMERCIAL

## 2018-03-26 DIAGNOSIS — M21.372 FOOT DROP, BILATERAL: ICD-10-CM

## 2018-03-26 DIAGNOSIS — G62.9 NEUROPATHY: ICD-10-CM

## 2018-03-26 DIAGNOSIS — M21.371 FOOT DROP, BILATERAL: ICD-10-CM

## 2018-03-26 DIAGNOSIS — R26.2 AMBULATORY DYSFUNCTION: Primary | ICD-10-CM

## 2018-03-26 PROCEDURE — G8979 MOBILITY GOAL STATUS: HCPCS | Performed by: PHYSICAL THERAPIST

## 2018-03-26 PROCEDURE — G8978 MOBILITY CURRENT STATUS: HCPCS | Performed by: PHYSICAL THERAPIST

## 2018-03-26 PROCEDURE — 97110 THERAPEUTIC EXERCISES: CPT | Performed by: PHYSICAL THERAPIST

## 2018-03-26 NOTE — PROGRESS NOTES
PT Re-Evaluation     Today's date: 3/26/2018  Patient name: Pelon Glaser  : [birth year]  MRN: [id number]  Referring provider: Hi Peters MD  Dx:   Encounter Diagnosis     ICD-10-CM    1  Ambulatory dysfunction R26 2 PT plan of care cert/re-cert   2  Foot drop, bilateral M21 371 PT plan of care cert/re-cert    G52 201    3  Neuropathy G62 9 PT plan of care cert/re-cert                  Assessment    Assessment details:   CURRENT FUNCTIONAL STATUS    Standing/ADL tolerance 45 minutes with a SPC  Walking tolerance 300 feet with a SPC  Ascends stairs reciprocally at times/descends stairs step by step with a SPC  Difficulty arising from sitting: Mild use of arms  Assistance of one for showering  SHORT TERM GOALS (2 WEEKS)    Increase Ankle DF AROM  5 degrees in all limited areas  Increase LE strength 3-5 lbs in all weak areas  TUG Score: 10 seconds, low fall risk  SLS R/L: 3/3 seconds  Standing/ADL tolerance 60 minutes with a SPC  Walking tolerance 500 feet with a SPC  Ascends/descends stairs reciprocally with a SPC  Difficulty arising from sitting: Minimal use of arms  Independent showering  LONG TERM GOALS (DISCHARGE)    B Hip AROM: WFL  B Hip Strength: F=24 lbs, ABD=25 lbs  B Knee AROM: WFL  B Knee Strength: E=40 lbs, F=30 lbs  B Ankle AROM: DF=0 deg, PF/IV/EV WFL  B Ankle Strength: DF=15 lbs, PF=30 lbs, IV=20 lbs, EV=15 lbs  TUG Score: 9 seconds, low fall risk  SLS R/L: 5/5 seconds  Standing/ADL tolerance 60-90 minutes with a SPC  Walking tolerance `000 feet with a SPC  Ascends/descends stairs reciprocally with a SPC  Difficulty arising from sitting: Minimal/no use of arms  Independent showering  Prognosis: good    Goals  See assessment details above  Plan  Planned therapy interventions: therapeutic exercise, therapeutic activities, neuromuscular re-education, balance and gait training  Frequency: 2x week  Duration in weeks: 4  Plan details:  The patient has shown improvement in PT demonstrating increased strength and increased tolerance to activity  The patient continues to present with pain, decreased ROM, decreased strength, and decreased tolerance to activity  The patient would benefit from continued skilled PT services to address these issues and to maximize function  Subjective Evaluation    History of Present Illness  Mechanism of injury: Subjective: The patient is making good progress with his strength, and with his tolernace for standing and walking  The pain from his lower extremity neuropathy is unchanged  Pain  Current pain ratin  At best pain ratin  At worst pain ratin    Patient Goals  Patient goals for therapy: decreased pain, improved balance, increased motion, increased strength and independence with ADLs/IADLs          Objective     Ambulation     Observational Gait     Additional Observational Gait Details  Ambulates with a SPC on the right side  He is able to heel strike on both legs  Comments   CURRENT OBJECTIVE MEASUREMENTS    B Hip AROM: WFL  Hip Strength R/L: F=25/20 lbs, ABD=30/32 lbs  B Knee AROM: WFL  Knee Strength R/L: E=15/35 lbs, F=41/30 lbs  Ankle AROM R/L: DF=-30/-20 deg, PF/IV/EV WFL  Ankle Strength R/L: DF=8/13 lbs, Pf=38/40 lbs, IV=9/20 lbs, EV=9/11 lbs  TUG Score: 13 seconds, mild fall risk  SLS R/L: 1/1 seconds           Precautions: None     Daily Treatment Diary      Manual  2/27  3/1  3/14  3/16  3/23  3/26                         Exercise Diary                QS               SLR               SAQ               T-Band B Ankles all planes L 2 2/10  L 2 2X10  L 2 2/10    L 2 2/10  L 2 2/10   T-Band Sideways walk parallel bars      L 2  10x    L 2 10x  L 2 10x   LAQ               Mini Squats      30x    30x  30x   Steps Forward               Steps Lateral      6" 30x  6" 30x  6" 30x  6" 30x   PYR HAM: S10  P 6 , C P 5 3/10  P 5 3X10  P 5 3/10  P 6 3/10  P 6 3/10  P 6 3/10   PYR QUAD: S4   P 6 , C 1 P 1 3/10  P 1 3X10  P 1 3/10  P 1 3/10  P 1 3/10  P 1 3/10   Leg Press: S 11 P 2 3/10  P 2 3X10  P 2 3/10  P 3 3/10  P 3 3/10  P 3 3/10   XO TKE    P 3 2X10  P 3 3/10  P 4 3/10  P 4 3/10  P 4 3/10   SLS               NuStep: S12 , A12 L 4 10'  L 4 10'  L 4 10'  L 4 10'  L 4 10' L 4 10'   Hip Patterns    2# 2X10  2# 3/10    2# 2/10  2# 2/10         Modalities                  Flowsheet Rows    Flowsheet Row Most Recent Value   PT/OT G-Codes   Current Score  -- [PSFS 20/30]   Projected Score  -- [PSFS 21/30]   FOTO information reviewed  N/A   Assessment Type  Re-evaluation   G code set  Mobility: Walking & Moving Around   Mobility: Walking and Moving Around Current Status ()  CJ   Mobility: Walking and Moving Around Goal Status ()  CI [New goal based on grester improvement being achievd than originally anticipated]

## 2018-03-30 ENCOUNTER — OFFICE VISIT (OUTPATIENT)
Dept: PHYSICAL THERAPY | Facility: CLINIC | Age: 68
End: 2018-03-30
Payer: COMMERCIAL

## 2018-03-30 DIAGNOSIS — R26.2 AMBULATORY DYSFUNCTION: Primary | ICD-10-CM

## 2018-03-30 DIAGNOSIS — G62.9 NEUROPATHY: ICD-10-CM

## 2018-03-30 DIAGNOSIS — M21.372 FOOT DROP, BILATERAL: ICD-10-CM

## 2018-03-30 DIAGNOSIS — M21.371 FOOT DROP, BILATERAL: ICD-10-CM

## 2018-03-30 PROCEDURE — 97110 THERAPEUTIC EXERCISES: CPT | Performed by: PHYSICAL THERAPIST

## 2018-03-30 NOTE — PROGRESS NOTES
Daily Note     Today's date: 3/30/2018  Patient name: Glenys Mcgregor  :   MRN: [id number]  Referring provider: Harry Chowdhury MD  Dx:   Encounter Diagnosis     ICD-10-CM    1  Ambulatory dysfunction R26 2    2  Foot drop, bilateral M21 371     M21 372    3  Neuropathy G62 9                   Subjective: Patient states that he can now get out of the shower by himself, and goes up 3 steps normally  Objective: See treatment diary below    Assessment: Tolerated treatment well  Patient demonstrated fatigue post treatment      Plan: Progress treatment as tolerated        Precautions: None     Daily Treatment Diary      Manual  3/30  3/1  3/14  3/16  3/23  3/26                         Exercise Diary                QS               SLR               SAQ               T-Band B Ankles all planes L 2 2/10  L 2 2X10  L 2 2/10    L 2 2/10  L 2 2/10   T-Band Sideways walk parallel bars  L 2 10x    L 2  10x    L 2 10x  L 2 10x   LAQ               Mini Squats      30x    30x  30x   Steps Forward               Steps Lateral  6" 30x    6" 30x  6" 30x  6" 30x  6" 30x   PYR HAM: S10  P 6 , C P 6 3/10  P 5 3X10  P 5 3/10  P 6 3/10  P 6 3/10  P 6 3/10   PYR QUAD: S4   P 6 , C 1 P 1 3/10  P 1 3X10  P 1 3/10  P 1 3/10  P 1 3/10  P 1 3/10   Leg Press: S 11 P 3 5 3/10  P 2 3X10  P 2 3/10  P 3 3/10  P 3 3/10  P 3 3/10   XO TKE  P 4 3/10  P 3 2X10  P 3 3/10  P 4 3/10  P 4 3/10  P 4 3/10   SLS               NuStep: S12 , A12 L 4 10'  L 4 10'  L 4 10'  L 4 10'  L 4 10' L 4 10'   Hip Patterns  2# 2/10  2# 2X10  2# 3/10    2# 2/10  2# 2/10

## 2018-04-02 ENCOUNTER — APPOINTMENT (OUTPATIENT)
Dept: PHYSICAL THERAPY | Facility: CLINIC | Age: 68
End: 2018-04-02
Payer: COMMERCIAL

## 2018-04-04 ENCOUNTER — OFFICE VISIT (OUTPATIENT)
Dept: PHYSICAL THERAPY | Facility: CLINIC | Age: 68
End: 2018-04-04
Payer: COMMERCIAL

## 2018-04-04 DIAGNOSIS — M21.372 FOOT DROP, BILATERAL: ICD-10-CM

## 2018-04-04 DIAGNOSIS — M21.371 FOOT DROP, BILATERAL: ICD-10-CM

## 2018-04-04 DIAGNOSIS — G62.9 NEUROPATHY: ICD-10-CM

## 2018-04-04 DIAGNOSIS — R26.2 AMBULATORY DYSFUNCTION: Primary | ICD-10-CM

## 2018-04-04 PROCEDURE — 97110 THERAPEUTIC EXERCISES: CPT | Performed by: PHYSICAL THERAPIST

## 2018-04-04 NOTE — PROGRESS NOTES
Daily Note     Today's date: 2018  Patient name: Angelo Hunter  :   MRN: [id number]  Referring provider: Nathan Bales MD  Dx:   Encounter Diagnosis     ICD-10-CM    1  Ambulatory dysfunction R26 2    2  Foot drop, bilateral M21 371     M21 372    3  Neuropathy G62 9                   Subjective: No complaints, feeling stronger in his legs  Objective: See treatment diary below    Assessment: Tolerated treatment well  Patient demonstrated fatigue post treatment      Plan: Progress treatment as tolerated          Precautions: None     Daily Treatment Diary      Manual  3/30  4/4  3/14  3/16  3/23  3/26                         Exercise Diary                QS               SLR               SAQ               T-Band B Ankles all planes L 2 2/10  L 2 2X10  L 2 2/10    L 2 2/10  L 2 2/10   T-Band Sideways walk parallel bars  L 2 10x  L 2 10x  L 2  10x    L 2 10x  L 2 10x   LAQ               Mini Squats      30x    30x  30x   Steps Forward               Steps Lateral  6" 30x  6" 30x  6" 30x  6" 30x  6" 30x  6" 30x   PYR HAM: S10  P 6 , C P 6 3/10  P 5 3X10  P 5 3/10  P 6 3/10  P 6 3/10  P 6 3/10   PYR QUAD: S4   P 6 , C 1 P 1 3/10  P 1 3X10  P 1 3/10  P 1 3/10  P 1 3/10  P 1 3/10   Leg Press: S 11 P 3 5 3/10  P 2 3X10  P 2 3/10  P 3 3/10  P 3 3/10  P 3 3/10   XO TKE  P 4 3/10  P 3 2X10  P 3 3/10  P 4 3/10  P 4 3/10  P 4 3/10   SLS               NuStep: S12 , A12 L 4 10'  L 4 10'  L 4 10'  L 4 10'  L 4 10' L 4 10'   Hip Patterns  2# 2/10  2# 2X10  2# 3/10    2# 2/10  2# 2/10

## 2018-04-07 DIAGNOSIS — G60.9 IDIOPATHIC PERIPHERAL NEUROPATHY: ICD-10-CM

## 2018-04-07 RX ORDER — GABAPENTIN 300 MG/1
CAPSULE ORAL
Qty: 90 CAPSULE | Refills: 0 | Status: SHIPPED | OUTPATIENT
Start: 2018-04-07 | End: 2018-06-28 | Stop reason: SDUPTHER

## 2018-04-09 ENCOUNTER — OFFICE VISIT (OUTPATIENT)
Dept: PHYSICAL THERAPY | Facility: CLINIC | Age: 68
End: 2018-04-09
Payer: COMMERCIAL

## 2018-04-09 DIAGNOSIS — M21.371 FOOT DROP, BILATERAL: ICD-10-CM

## 2018-04-09 DIAGNOSIS — R26.2 AMBULATORY DYSFUNCTION: Primary | ICD-10-CM

## 2018-04-09 DIAGNOSIS — M21.372 FOOT DROP, BILATERAL: ICD-10-CM

## 2018-04-09 DIAGNOSIS — G62.9 NEUROPATHY: ICD-10-CM

## 2018-04-09 PROCEDURE — 97110 THERAPEUTIC EXERCISES: CPT | Performed by: PHYSICAL THERAPIST

## 2018-04-09 NOTE — PROGRESS NOTES
Daily Note     Today's date: 2018  Patient name: Nick Haskins  :   MRN: [id number]  Referring provider: Karin Iraheta MD  Dx: No diagnosis found  Subjective: Patient states that his leg pain was not as bad over the weekend  He was able to walk 150 yards several times  Objective: See treatment diary below      Assessment: Tolerated treatment well  Progressed to SLS on floor  Patient needed occasional assistance from parallel bars to maintain balance  Plan: Progress treatment as tolerated        Precautions: None     Daily Treatment Diary      Manual  3/30  4/4  4/9  3/16  3/23  3/26                         Exercise Diary                SLS Bilateral on floor      15" x 3 each         T-Band B Ankles all planes L 2 2/10  L 2 2X10  L 2 2/10    L 2 2/10  L 2 2/10   T-Band Sideways walk parallel bars  L 2 10x  L 2 10x  L 2  10x    L 2 10x  L 2 10x   LAQ               Mini Squats          30x  30x   Steps Forward               Steps Lateral  6" 30x  6" 30x  6" 30x  6" 30x  6" 30x  6" 30x   PYR HAM: S10  P 6 , C P 6 3/10  P 5 3X10  P 5 3/10  P 6 3/10  P 6 3/10  P 6 3/10   PYR QUAD: S4   P 6 , C 1 P 1 3/10  P 1 3X10  P 1 3/10  P 1 3/10  P 1 3/10  P 1 3/10   Leg Press: S 11 P 3 5 3/10  P 2 3X10  P 2 3/10  P 3 3/10  P 3 3/10  P 3 3/10   XO TKE  P 4 3/10  P 3 2X10  P 3 3/10  P 4 3/10  P 4 3/10  P 4 3/10   SLS               NuStep: S12 , A12 L 4 10'  L 4 10'  L 4 10'  L 4 10'  L 4 10' L 4 10'   Hip Patterns  2# 2/10  2# 2X10  2# 3/10    2# 2/10  2# 2/10

## 2018-04-11 ENCOUNTER — OFFICE VISIT (OUTPATIENT)
Dept: PHYSICAL THERAPY | Facility: CLINIC | Age: 68
End: 2018-04-11
Payer: COMMERCIAL

## 2018-04-11 DIAGNOSIS — R26.2 AMBULATORY DYSFUNCTION: Primary | ICD-10-CM

## 2018-04-11 DIAGNOSIS — M21.372 FOOT DROP, BILATERAL: ICD-10-CM

## 2018-04-11 DIAGNOSIS — M21.371 FOOT DROP, BILATERAL: ICD-10-CM

## 2018-04-11 PROCEDURE — 97110 THERAPEUTIC EXERCISES: CPT

## 2018-04-16 ENCOUNTER — OFFICE VISIT (OUTPATIENT)
Dept: PHYSICAL THERAPY | Facility: CLINIC | Age: 68
End: 2018-04-16
Payer: COMMERCIAL

## 2018-04-16 DIAGNOSIS — G62.9 NEUROPATHY: ICD-10-CM

## 2018-04-16 DIAGNOSIS — R26.2 AMBULATORY DYSFUNCTION: Primary | ICD-10-CM

## 2018-04-16 DIAGNOSIS — M21.371 FOOT DROP, BILATERAL: ICD-10-CM

## 2018-04-16 DIAGNOSIS — M21.372 FOOT DROP, BILATERAL: ICD-10-CM

## 2018-04-16 PROCEDURE — 97110 THERAPEUTIC EXERCISES: CPT | Performed by: PHYSICAL THERAPIST

## 2018-04-16 NOTE — PROGRESS NOTES
Daily Note     Today's date: 2018  Patient name: Meena White  :   MRN: [id number]  Referring provider: Jamison Hernandez MD  Dx:   Encounter Diagnosis     ICD-10-CM    1  Ambulatory dysfunction R26 2    2  Foot drop, bilateral M21 371     M21 372    3  Neuropathy G62 9                   Subjective: The patient can now shower without assistance from his aide  Objective: See treatment diary below      Assessment: Tolerated treatment well  Patient exhibited good technique with therapeutic exercises      Plan: Progress treatment as tolerated        Precautions: None     Daily Treatment Diary      Manual  3/30  4/4  4/9  4/11  4/16  3/26                         Exercise Diary                SLS Bilateral on floor      15" x 3 each         T-Band B Ankles all planes L 2 2/10  L 2 2X10  L 2 2/10 L2 2/10  L 2 2/10  L 2 2/10   T monster       L 2 10X  L 2 10x     T-Band Sideways walk parallel bars  L 2 10x  L 2 10x  L 2  10x L 2 10X  L 2 10x  L 2 10x   LAQ               Mini Squats          30x  30x   Steps Forward               Steps Lateral  6" 30x  6" 30x  6" 30x  6" 30x  6" 30x  6" 30x   PYR HAM: S10  P 6 , C P 6 3/10  P 5 3X10  P 5 3/10  P 6 3/10  P 6 3/10  P 6 3/10   PYR QUAD: S4   P 6 , C 1 P 1 3/10  P 1 3X10  P 1 3/10  P 2 3/10  P 2 3/10  P 1 3/10   Leg Press: S 11 P 3 5 3/10  P 2 3X10  P 2 3/10  P 3 5 3/10  P 3 5 3/10  P 3 3/10   XO TKE  P 4 3/10  P 3 2X10  P 3 3/10  P 4 3/10  P 4 3/10  P 4 3/10   SLS        15" X 3ea  15" x 3 ea     NuStep: S 9 , A12 L 4 10'  L 4 10'  L 4 10'  L 4 10'  L 4 10' L 4 10'   Hip Patterns  2# 2/10  2# 2X10  2# 3/10  2# 3/10  2# 3/10  2# 2/10

## 2018-04-18 ENCOUNTER — OFFICE VISIT (OUTPATIENT)
Dept: PHYSICAL THERAPY | Facility: CLINIC | Age: 68
End: 2018-04-18
Payer: COMMERCIAL

## 2018-04-18 DIAGNOSIS — M21.372 FOOT DROP, BILATERAL: ICD-10-CM

## 2018-04-18 DIAGNOSIS — R26.2 AMBULATORY DYSFUNCTION: Primary | ICD-10-CM

## 2018-04-18 DIAGNOSIS — M21.371 FOOT DROP, BILATERAL: ICD-10-CM

## 2018-04-18 DIAGNOSIS — G62.9 NEUROPATHY: ICD-10-CM

## 2018-04-18 PROCEDURE — 97110 THERAPEUTIC EXERCISES: CPT | Performed by: PHYSICAL THERAPIST

## 2018-04-18 NOTE — PROGRESS NOTES
Daily Note     Today's date: 2018  Patient name: Morgan Trujillo  :   MRN: [id number]  Referring provider: Babita Begum MD  Dx:   Encounter Diagnosis     ICD-10-CM    1  Ambulatory dysfunction R26 2    2  Foot drop, bilateral M21 371     M21 372    3  Neuropathy G62 9                   Subjective: Patient feels stronger in his legs, but the right ankle is still weak when trying to bend it up  Objective: See treatment diary below      Assessment: Tolerated treatment well  Patient demonstrated fatigue post treatment      Plan: Progress note during next visit         Precautions: None     Daily Treatment Diary      Manual  3/30  4/4  4/9  4/11  4/16  4/18                         Exercise Diary                SLS Bilateral on floor      15" x 3 each         T-Band B Ankles all planes L 2 2/10  L 2 2X10  L 2 2/10 L2 2/10  L 2 2/10  L 2 2/10   T monster       L 2 10X  L 2 10x     T-Band Sideways walk parallel bars  L 2 10x  L 2 10x  L 2  10x L 2 10X  L 2 10x  L 2 10x   LAQ               Mini Squats          30x  30x   Steps Forward               Steps Lateral  6" 30x  6" 30x  6" 30x  6" 30x  6" 30x  6" 30x   PYR HAM: S10  P 6 , C P 6 3/10  P 5 3X10  P 5 3/10  P 6 3/10  P 6 3/10  P 6 3/10   PYR QUAD: S4   P 6 , C 1 P 1 3/10  P 1 3X10  P 1 3/10  P 2 3/10  P 2 3/10  P 2 3/10   Leg Press: S 11 P 3 5 3/10  P 2 3X10  P 2 3/10  P 3 5 3/10  P 3 5 3/10  P 3 5 3/10   XO TKE  P 4 3/10  P 3 2X10  P 3 3/10  P 4 3/10  P 4 3/10  P 4 3/10   SLS        15" X 3ea  15" x 3 ea  15"x3 each   NuStep: S 9 , A12 L 4 10'  L 4 10'  L 4 10'  L 4 10'  L 4 10' L 4 10'   Hip Patterns  2# 2/10  2# 2X10  2# 3/10  2# 3/10  2# 3/10  2# 3/10

## 2018-04-23 ENCOUNTER — EVALUATION (OUTPATIENT)
Dept: PHYSICAL THERAPY | Facility: CLINIC | Age: 68
End: 2018-04-23
Payer: COMMERCIAL

## 2018-04-23 DIAGNOSIS — R26.2 AMBULATORY DYSFUNCTION: Primary | ICD-10-CM

## 2018-04-23 DIAGNOSIS — M21.371 FOOT DROP, BILATERAL: ICD-10-CM

## 2018-04-23 DIAGNOSIS — G62.9 NEUROPATHY: ICD-10-CM

## 2018-04-23 DIAGNOSIS — M21.372 FOOT DROP, BILATERAL: ICD-10-CM

## 2018-04-23 PROCEDURE — G8979 MOBILITY GOAL STATUS: HCPCS | Performed by: PHYSICAL THERAPIST

## 2018-04-23 PROCEDURE — 97110 THERAPEUTIC EXERCISES: CPT | Performed by: PHYSICAL THERAPIST

## 2018-04-23 PROCEDURE — G8980 MOBILITY D/C STATUS: HCPCS | Performed by: PHYSICAL THERAPIST

## 2018-04-23 NOTE — PROGRESS NOTES
PT Discharge    Today's date: 2018  Patient name: Jorge Ospina  :   MRN: [id number]  Referring provider: Maura Essex, MD  Dx:   Encounter Diagnosis     ICD-10-CM    1  Ambulatory dysfunction R26 2    2  Foot drop, bilateral M21 371     M21 372    3  Neuropathy G62 9                   Assessment    Assessment details:   CURRENT FUNCTIONAL STATUS    Standing/ADL tolerance 60 minutes without a SPC  Walking tolerance 600 feet without a SPC  Ascends/descends stairs reciprocally  Independent showering  LONG TERM GOALS (DISCHARGE)    B Hip AROM: WFL-met  B Hip Strength: F=24 lbs, ABD=25 lbs  -met   B Knee AROM: WFL-met  B Knee Strength: E=40 lbs, F=30 lbs  -partially met   B Ankle AROM: DF=0 deg, PF/IV/EV WFL  -not met  B Ankle Strength: DF=15 lbs, PF=30 lbs, IV=20 lbs, EV=15 lbs -partially met   TUG Score: 10 seconds, low fall risk -met  SLS R/L: 5/5 seconds -partially met  Standing/ADL tolerance 60-90 minutes with a SPC  -met  Walking tolerance 1000 feet with a SPC  -partially met  Ascends/descends stairs reciprocally with a SPC  -met  Difficulty arising from sitting: Minimal/no use of arms-met  Independent showering -met           Prognosis: good    Goals  See assessment details above  Plan  Planned therapy interventions: therapeutic exercise, therapeutic activities, neuromuscular re-education, balance and gait training  Frequency: 2x week  Duration in weeks: 4  Plan details: The patient has shown improvement in PT demonstrating increased strength and increased tolerance to activity  The patient continues to present with pain, decreased ROM, decreased strength, and decreased tolerance to activity  The patient would benefit from continued skilled PT services to address these issues and to maximize function  Subjective Evaluation    History of Present Illness  Mechanism of injury: Subjective: The patient has made excellent progress in rehab   Recently he began walking without his cane, even feeling steady on inclines and declines  He can also ascend and descend stairs reciprocally with good stability  He is now independent with all ADL's and iADL's  He feels that he can be discharged from our care at this time  He will continue with his HEP  Pain  Current pain ratin  At best pain ratin  At worst pain ratin          Objective     Ambulation     Observational Gait     Additional Observational Gait Details  Ambulates without an assistive device  He is able to heel strike on both legs  Comments   CURRENT OBJECTIVE MEASUREMENTS    B Hip AROM: WFL  Hip Strength R/L: F=33/25 lbs, ABD=34/31 lbs  B Knee AROM: WFL  Knee Strength R/L: E=23/49 lbs, F=51/34 lbs  Ankle AROM R/L: DF=-30/-20 deg, PF/IV/EV WFL  Ankle Strength R/L: DF=9/13 lbs, PF=38/52 lbs, IV=10/20 lbs, EV=11/11 lbs  TUG Score: 10 seconds, mild fall risk  SLS R/L: 3/3 seconds           Precautions: None     Daily Treatment Diary      Manual                           Exercise Diary                SLS Bilateral on floor      15" x 3 each         T-Band B Ankles all planes L 2 2/10  L 2 2X10  L 2 2/10 L2 2/10  L 2 2/10  L 2 2/10   T monster       L 2 10X  L 2 10x     T-Band Sideways walk parallel bars  L 2 10x  L 2 10x  L 2  10x L 2 10X  L 2 10x  L 2 10x   LAQ               Mini Squats          30x  30x   Steps Forward               Steps Lateral   6" 30x  6" 30x  6" 30x  6" 30x  6" 30x   PYR HAM: S10  P 6 , C   P 5 3X10  P 5 3/10  P 6 3/10  P 6 3/10  P 6 3/10   PYR QUAD: S4   P 6 , C 1   P 1 3X10  P 1 3/10  P 2 3/10  P 2 3/10  P 2 3/10   Leg Press: S 11   P 2 3X10  P 2 3/10  P 3 5 3/10  P 3 5 3/10  P 3 5 3/10   XO TKE    P 3 2X10  P 3 3/10  P 4 3/10  P 4 3/10  P 4 3/10   SLS        15" X 3ea  15" x 3 ea  15"x3 each   NuStep: S 9 , A12 L 4 10'  L 4 10'  L 4 10'  L 4 10'  L 4 10' L 4 10'   Hip Patterns  2# 2/10  2# 2X10  2# 3/10  2# 3/10  2# 3/10  2# 3/10

## 2018-04-24 DIAGNOSIS — E11.8 TYPE 2 DIABETES MELLITUS WITH COMPLICATION, UNSPECIFIED WHETHER LONG TERM INSULIN USE: Primary | ICD-10-CM

## 2018-04-24 RX ORDER — PRAVASTATIN SODIUM 20 MG
20 TABLET ORAL
Qty: 90 TABLET | Refills: 1 | Status: SHIPPED | OUTPATIENT
Start: 2018-04-24 | End: 2018-10-19 | Stop reason: SDUPTHER

## 2018-04-25 ENCOUNTER — APPOINTMENT (OUTPATIENT)
Dept: PHYSICAL THERAPY | Facility: CLINIC | Age: 68
End: 2018-04-25
Payer: COMMERCIAL

## 2018-05-15 NOTE — PROGRESS NOTES
Patient ID: Meena White is a 79 y o  male  Assessment/Plan:    No problem-specific Assessment & Plan notes found for this encounter  Patient is a 27-year-old gentleman complains of ambulatory dysfunction and progressive weakness of the bilateral lower extremities for about a year associated with paresthesias and bilateral lower extremity pain, atrophy of the muscles, footdrop and weight loss  The patient had an abnormal EMG of bilateral lower extremities suggestive of neuropathy axonal and demyelinating in nature, with active denervation  Differential diagnosis include CIDP, diabetic amyotrophy, rule out diffuse denervation process like MND, lumbosacral plexopathy  With foot drops and loss of reflexes, sensory and motor symptoms, 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  Pt hd significant progress with PT  It was suggested to undergo EMG, MRI lumbar spine  He did have neoplastic panel done which was normal, B12 was normal  We spoke to him regarding IVIG treatment  pt does not want to persue any more testing,nor does he want any treatment      Fall precautions have been recommended  Continue with home exercizes  continue with Gabapentin  Patient continuing with Aggrenox for old TIA  Will have the patient follow up in 6 months     Diagnoses and all orders for this visit:    Other specified transient cerebral ischemias  -     aspirin-dipyridamole (AGGRENOX)  mg per 12 hr capsule; Take 1 capsule by mouth every 12 (twelve) hours    CIDP (chronic inflammatory demyelinating polyneuropathy) ([facility])    Neuropathy    Ambulatory dysfunction    Foot drop, bilateral           Subjective:    HPI   Meena White is a 79 y o  male who presents with ambulatory dysfunction      Patient states that he was ambulating without assistance until December 2016  In January 2017 he started noticing left lower extremity weakness and was limping  He went to PT and did not help  His PT noticed some muscle wasting   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  In August 2017 he started having weakness of the right lower extremity  He had MRI L-spine and was evaluated by pain management and LG and did not help  He also complaints of burning, numbness and pain in the thighs and groin to the feet bilaterally  He does have diabetes and neuropathy  He takes Gabapentin 300 mg four times a day  He denies any complaints of symptoms in hands  He denies any diplopia, dysphagia, SOB, urinary or bowel incontinence  Patient had an MRI of the lumbosacral spine that showed no evidence of cord compression however showed L5-S1 radiculopathy  He was evaluated by Dr Lin Kussmaul and he did not feel disc was responsible for his symptoms  He had an EMG performed that showed evidence of axonal peripheral polyneuropathy and feels that his right lower extremity gives out randomly  Patient also complained that he cannot stand for prolonged periods  Additionally patient also reports that he lost about 50 lbs in the last 18 months  He is also following up with the pulmonologist as he is noted to have a pulmonary not do and he has seen Urology as there was a nodule in the adrenal gland  Pt had CVA/TIA in the past and is on  Aggrenox          Interval history: today 5/17/18:   pt clinically stable  He has completed PT 2 weeks ago with significant improvement in his gait and strength  He only uses a cane outside the house  No bracing, no reported falls  He has ongoing neuropathy for which he uses Gabapentin as needed  He reports his BS runs 100-130  It was suggested that he undergo MRI lumbar and EMG UE but pt has refused any further testing  Attempts were made to start IVIG treatments, his insurance would not cover in home infusions, At this time he does not want topersue any more testing nor does he want to do any treatment     -- labs: paraneoplastic panel normal, V99=590, IgA normal   -- EMG performed in our lab on 11/20/2017 showed a generalized, sensorimotor predominantly axonal with some demyelinating features peripheral polyneuropathy, diffuse denervation was noted and difficult to differentiate if you to neuropathy or radiculopathy and however some denervation was noted in right paraspinal muscles as well  An underlying chronic/active bilateral L4, L5, S1 radiculopathies with some denervation in the right lower lumbar paraspinal muscles  There was no evidence of myopathy  -- CSF showed WBCs 2, RBC 3, total protein 53, VDRL nonreactive, CSF glucose 50, CSF IgG index 0 6, IgG synthetic rate 1 1, MBP 2 5, oligo clonal bands 0  CSF cytology negative for malignancy  VZV PCR negative, Lyme PCR negative  CSF culture no growth  -- ACH receptor antibodies negative, C3 complement normal  The thyroid microsomal antibody normal   Immunoglobulin free light change in serum normal  Sharon normal, HbA1c 8 3, Lyme negative, B6 8 2, B12 221, vitamin-D 13  SPEP showed no monoclonal bands  --MRI thoracic spine on 11/07/2017 was normal   MRI cervical spine on 11/17/2017 showed mild noncompressive cervical spondylitic degenerative change  --MRI lumbar spine in September 2017 showed mild lumbar degenerative disc disease with annular bulging and minimal facet degenerative change  At L5-S1 there is severe left and moderate right foraminal narrowing  No cauda equina compression  ---CT chest abdomen pelvis in September 2016 showed 1  No urinary tract calculi  No hydronephrosis  Mild stranding in the small bowel mesentery without pathologic lymphadenopathy  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 though early lymphoma can have a similar appearance  Mildly enlarged retroperitoneal lymph node adjacent to the left adrenal gland  2 mm left lower lobe lung nodule    According to guidelines by the [employer]isch[employer] society (Radiology 2005; 202:710-975) in patients with low risk for lung cancer and nodules less than 5 mm in diameter no further imaging is required  In patients with a higher risk, such as those who are smokers, follow-up is recommended in one year  Patients with a known malignancy and are at increased risk of metastasis should receive three month follow-up  The following portions of the patient's history were reviewed and updated as appropriate: allergies, current medications, past family history, past medical history, past social history, past surgical history and problem list          Objective:    Blood pressure 128/68, pulse 72, resp  rate 18, height 6' (1 829 m), weight 73 5 kg (162 lb 2 oz)  Physical Exam   Constitutional: He appears well-developed  HENT:   Head: Normocephalic  Eyes: Pupils are equal, round, and reactive to light  Neck: Normal range of motion  Cardiovascular: Normal rate and regular rhythm  Pulmonary/Chest: Effort normal    Musculoskeletal: He exhibits edema  Psychiatric: He has a normal mood and affect  His speech is normal and behavior is normal  Judgment and thought content normal        Neurological Exam    Mental Status  The patient is and oriented to person, place, time, and situation  He recalls 2 of 3 objects immediately  His speech is normal  His language is fluent with no aphasia  He has normal attention span and concentration  He follows multi-step commands  He has a normal fund of knowledge  Cranial Nerves  CN I: The patient has not tested  CN III, IV, VI: The patient's pupils are equally round and reactive to light  CN V: The patient has normal facial sensation  CN VII:  The patient has symmetric facial movement  CN VIII:  The patient's hearing is normal   CN XI: The patient's shoulder shrug strength is normal   CN XII: The patient's tongue is midline  Motor   His overall muscle tone is normal throughout  He has normal movement                                                Right                   Left  Hip flexion                                4 3+  Hip abduction                           4-                         4-  Plantarflexion                            4-                         3-  Dorsiflexion                               3+                         2  Toe flexion                                2                         1  UE intact B/L  Sensory    UE intact  LE absent vibration GT, malleolous B/L,  Decreased patellar, decreased temperature B/L LE to mid calf     Reflexes  UE 1+, absent LE     Gait and Coordination   He has normal right finger to nose and normal left finger to nose coordination  Ambulates with cane  Able to arise without use of arms         ROS:    Review of Systems   HENT: Negative  Eyes: Negative  Respiratory: Positive for chest tightness  Cardiovascular: Negative for chest pain  Occasional swelling right ankle   Gastrointestinal: Negative  Endocrine: Negative  Genitourinary:        Erection difficulty   Musculoskeletal: Positive for gait problem  Skin: Negative  Allergic/Immunologic: Negative  Neurological: Positive for weakness  Balance issues   Hematological: Negative  Psychiatric/Behavioral: The patient is nervous/anxious           Mood swings         Labs:  Paraneoplastic profile negative, IgGA normal, vitamin B12 = 386

## 2018-05-17 ENCOUNTER — OFFICE VISIT (OUTPATIENT)
Dept: NEUROLOGY | Facility: CLINIC | Age: 68
End: 2018-05-17
Payer: COMMERCIAL

## 2018-05-17 VITALS
HEIGHT: 72 IN | WEIGHT: 162.13 LBS | SYSTOLIC BLOOD PRESSURE: 128 MMHG | DIASTOLIC BLOOD PRESSURE: 68 MMHG | HEART RATE: 72 BPM | BODY MASS INDEX: 21.96 KG/M2 | RESPIRATION RATE: 18 BRPM

## 2018-05-17 DIAGNOSIS — R26.2 AMBULATORY DYSFUNCTION: ICD-10-CM

## 2018-05-17 DIAGNOSIS — M21.371 FOOT DROP, BILATERAL: ICD-10-CM

## 2018-05-17 DIAGNOSIS — G62.9 NEUROPATHY: ICD-10-CM

## 2018-05-17 DIAGNOSIS — M21.372 FOOT DROP, BILATERAL: ICD-10-CM

## 2018-05-17 DIAGNOSIS — G45.8 OTHER SPECIFIED TRANSIENT CEREBRAL ISCHEMIAS: Primary | ICD-10-CM

## 2018-05-17 DIAGNOSIS — G61.81 CIDP (CHRONIC INFLAMMATORY DEMYELINATING POLYNEUROPATHY) (HCC): ICD-10-CM

## 2018-05-17 PROCEDURE — 99214 OFFICE O/P EST MOD 30 MIN: CPT | Performed by: NURSE PRACTITIONER

## 2018-05-17 RX ORDER — ASPIRIN AND DIPYRIDAMOLE 25; 200 MG/1; MG/1
1 CAPSULE, EXTENDED RELEASE ORAL EVERY 12 HOURS SCHEDULED
Qty: 180 CAPSULE | Refills: 3 | Status: SHIPPED | OUTPATIENT
Start: 2018-05-17 | End: 2019-06-20 | Stop reason: SDUPTHER

## 2018-05-17 NOTE — PATIENT INSTRUCTIONS
contniue with B12  Gabapentin as needed   contniue with home exercizes, if increased issues can refer back to PT  continue with Aggrenox   now on statin

## 2018-05-29 LAB
LEFT EYE DIABETIC RETINOPATHY: NORMAL
RIGHT EYE DIABETIC RETINOPATHY: NORMAL

## 2018-06-28 DIAGNOSIS — G60.9 IDIOPATHIC PERIPHERAL NEUROPATHY: ICD-10-CM

## 2018-06-28 RX ORDER — GABAPENTIN 300 MG/1
300 CAPSULE ORAL 3 TIMES DAILY
Qty: 90 CAPSULE | Refills: 2 | Status: SHIPPED | OUTPATIENT
Start: 2018-06-28 | End: 2018-11-05 | Stop reason: SDUPTHER

## 2018-10-19 DIAGNOSIS — E11.8 TYPE 2 DIABETES MELLITUS WITH COMPLICATION, UNSPECIFIED WHETHER LONG TERM INSULIN USE: ICD-10-CM

## 2018-10-19 RX ORDER — PRAVASTATIN SODIUM 20 MG
TABLET ORAL
Qty: 90 TABLET | Refills: 1 | Status: SHIPPED | OUTPATIENT
Start: 2018-10-19 | End: 2019-04-06 | Stop reason: SDUPTHER

## 2018-11-05 DIAGNOSIS — G60.9 IDIOPATHIC PERIPHERAL NEUROPATHY: ICD-10-CM

## 2018-11-05 RX ORDER — GABAPENTIN 300 MG/1
CAPSULE ORAL
Qty: 90 CAPSULE | Refills: 2 | Status: SHIPPED | OUTPATIENT
Start: 2018-11-05 | End: 2018-12-28 | Stop reason: SDUPTHER

## 2018-12-20 ENCOUNTER — OFFICE VISIT (OUTPATIENT)
Dept: FAMILY MEDICINE CLINIC | Facility: CLINIC | Age: 68
End: 2018-12-20
Payer: COMMERCIAL

## 2018-12-20 VITALS
BODY MASS INDEX: 23.84 KG/M2 | HEIGHT: 72 IN | DIASTOLIC BLOOD PRESSURE: 80 MMHG | WEIGHT: 176 LBS | SYSTOLIC BLOOD PRESSURE: 140 MMHG

## 2018-12-20 DIAGNOSIS — E11.9 TYPE 2 DIABETES MELLITUS WITHOUT COMPLICATION, WITHOUT LONG-TERM CURRENT USE OF INSULIN (HCC): ICD-10-CM

## 2018-12-20 DIAGNOSIS — M75.101 TEAR OF RIGHT ROTATOR CUFF, UNSPECIFIED TEAR EXTENT: Primary | ICD-10-CM

## 2018-12-20 PROCEDURE — 99214 OFFICE O/P EST MOD 30 MIN: CPT | Performed by: FAMILY MEDICINE

## 2018-12-20 PROCEDURE — 1036F TOBACCO NON-USER: CPT | Performed by: FAMILY MEDICINE

## 2018-12-20 PROCEDURE — 3008F BODY MASS INDEX DOCD: CPT | Performed by: FAMILY MEDICINE

## 2018-12-20 PROCEDURE — 1160F RVW MEDS BY RX/DR IN RCRD: CPT | Performed by: FAMILY MEDICINE

## 2018-12-28 DIAGNOSIS — E11.9 TYPE 2 DIABETES MELLITUS WITHOUT COMPLICATION, WITHOUT LONG-TERM CURRENT USE OF INSULIN (HCC): ICD-10-CM

## 2018-12-28 DIAGNOSIS — G60.9 IDIOPATHIC PERIPHERAL NEUROPATHY: ICD-10-CM

## 2018-12-28 RX ORDER — GABAPENTIN 300 MG/1
300 CAPSULE ORAL 3 TIMES DAILY
Qty: 90 CAPSULE | Refills: 5 | Status: SHIPPED | OUTPATIENT
Start: 2018-12-28 | End: 2019-06-20 | Stop reason: SDUPTHER

## 2019-04-06 DIAGNOSIS — E11.8 TYPE 2 DIABETES MELLITUS WITH COMPLICATION, UNSPECIFIED WHETHER LONG TERM INSULIN USE: ICD-10-CM

## 2019-04-08 RX ORDER — PRAVASTATIN SODIUM 20 MG
TABLET ORAL
Qty: 90 TABLET | Refills: 1 | Status: SHIPPED | OUTPATIENT
Start: 2019-04-08 | End: 2021-10-11 | Stop reason: SDDI

## 2019-06-20 DIAGNOSIS — G45.8 OTHER SPECIFIED TRANSIENT CEREBRAL ISCHEMIAS: ICD-10-CM

## 2019-06-20 DIAGNOSIS — E11.9 TYPE 2 DIABETES MELLITUS WITHOUT COMPLICATION, WITHOUT LONG-TERM CURRENT USE OF INSULIN (HCC): ICD-10-CM

## 2019-06-20 DIAGNOSIS — G60.9 IDIOPATHIC PERIPHERAL NEUROPATHY: ICD-10-CM

## 2019-06-20 RX ORDER — ASPIRIN AND DIPYRIDAMOLE 25; 200 MG/1; MG/1
1 CAPSULE, EXTENDED RELEASE ORAL EVERY 12 HOURS SCHEDULED
Qty: 180 CAPSULE | Refills: 1 | Status: SHIPPED | OUTPATIENT
Start: 2019-06-20 | End: 2019-12-09 | Stop reason: SDUPTHER

## 2019-06-20 RX ORDER — GABAPENTIN 300 MG/1
CAPSULE ORAL
Qty: 270 CAPSULE | Refills: 1 | Status: SHIPPED | OUTPATIENT
Start: 2019-06-20 | End: 2019-12-09 | Stop reason: SDUPTHER

## 2019-08-01 LAB
LEFT EYE DIABETIC RETINOPATHY: NORMAL
RIGHT EYE DIABETIC RETINOPATHY: NORMAL

## 2019-12-09 DIAGNOSIS — E11.9 TYPE 2 DIABETES MELLITUS WITHOUT COMPLICATION, WITHOUT LONG-TERM CURRENT USE OF INSULIN (HCC): ICD-10-CM

## 2019-12-09 DIAGNOSIS — G60.9 IDIOPATHIC PERIPHERAL NEUROPATHY: ICD-10-CM

## 2019-12-09 DIAGNOSIS — G45.8 OTHER SPECIFIED TRANSIENT CEREBRAL ISCHEMIAS: ICD-10-CM

## 2019-12-09 RX ORDER — GABAPENTIN 300 MG/1
CAPSULE ORAL
Qty: 270 CAPSULE | Refills: 1 | Status: SHIPPED | OUTPATIENT
Start: 2019-12-09 | End: 2020-06-03

## 2019-12-09 RX ORDER — ASPIRIN AND DIPYRIDAMOLE 25; 200 MG/1; MG/1
CAPSULE, EXTENDED RELEASE ORAL
Qty: 180 CAPSULE | Refills: 1 | Status: SHIPPED | OUTPATIENT
Start: 2019-12-09 | End: 2020-06-03

## 2020-03-10 ENCOUNTER — OFFICE VISIT (OUTPATIENT)
Dept: FAMILY MEDICINE CLINIC | Facility: CLINIC | Age: 70
End: 2020-03-10
Payer: COMMERCIAL

## 2020-03-10 ENCOUNTER — TELEPHONE (OUTPATIENT)
Dept: ADMINISTRATIVE | Facility: OTHER | Age: 70
End: 2020-03-10

## 2020-03-10 VITALS
SYSTOLIC BLOOD PRESSURE: 132 MMHG | WEIGHT: 184.2 LBS | HEIGHT: 72 IN | TEMPERATURE: 98.3 F | BODY MASS INDEX: 24.95 KG/M2 | OXYGEN SATURATION: 98 % | HEART RATE: 60 BPM | DIASTOLIC BLOOD PRESSURE: 80 MMHG

## 2020-03-10 DIAGNOSIS — E11.9 TYPE 2 DIABETES MELLITUS WITHOUT COMPLICATION, WITHOUT LONG-TERM CURRENT USE OF INSULIN (HCC): Primary | ICD-10-CM

## 2020-03-10 PROBLEM — G60.9 NEUROPATHY, PERIPHERAL, IDIOPATHIC: Status: ACTIVE | Noted: 2017-11-28

## 2020-03-10 PROBLEM — M54.16 LUMBAR RADICULOPATHY: Status: ACTIVE | Noted: 2017-10-06

## 2020-03-10 PROBLEM — R26.2 AMBULATORY DYSFUNCTION: Status: ACTIVE | Noted: 2017-10-24

## 2020-03-10 PROBLEM — R91.1 PULMONARY NODULE SEEN ON IMAGING STUDY: Status: ACTIVE | Noted: 2017-11-28

## 2020-03-10 PROBLEM — G63 B12 NEUROPATHY (HCC): Status: ACTIVE | Noted: 2017-11-28

## 2020-03-10 PROBLEM — E53.8 B12 NEUROPATHY (HCC): Status: ACTIVE | Noted: 2017-11-28

## 2020-03-10 LAB — SL AMB POCT HEMOGLOBIN AIC: 8.8 (ref ?–6.5)

## 2020-03-10 PROCEDURE — 3008F BODY MASS INDEX DOCD: CPT | Performed by: PHYSICIAN ASSISTANT

## 2020-03-10 PROCEDURE — 1125F AMNT PAIN NOTED PAIN PRSNT: CPT | Performed by: PHYSICIAN ASSISTANT

## 2020-03-10 PROCEDURE — 1160F RVW MEDS BY RX/DR IN RCRD: CPT | Performed by: PHYSICIAN ASSISTANT

## 2020-03-10 PROCEDURE — 4004F PT TOBACCO SCREEN RCVD TLK: CPT | Performed by: PHYSICIAN ASSISTANT

## 2020-03-10 PROCEDURE — 83036 HEMOGLOBIN GLYCOSYLATED A1C: CPT | Performed by: PHYSICIAN ASSISTANT

## 2020-03-10 PROCEDURE — 1170F FXNL STATUS ASSESSED: CPT | Performed by: PHYSICIAN ASSISTANT

## 2020-03-10 PROCEDURE — G0439 PPPS, SUBSEQ VISIT: HCPCS | Performed by: PHYSICIAN ASSISTANT

## 2020-03-10 PROCEDURE — 3052F HG A1C>EQUAL 8.0%<EQUAL 9.0%: CPT | Performed by: PHYSICIAN ASSISTANT

## 2020-03-10 NOTE — TELEPHONE ENCOUNTER
----- Message from Angel Luis Preciado sent at 3/10/2020  7:47 AM EDT -----  Regarding: DM Foot [facility]  Contact: [phone]  03/10/20 7:48 AM    Hello, our patient Vincent Pena has had Diabetic Foot Exam completed/performed  Please assist in updating the patient chart by making an External outreach to Dr Cirilo Chou facility located in [facility]  The date of service is 1 month      Thank you,  Angel Luis Preciado  [facility]

## 2020-03-10 NOTE — TELEPHONE ENCOUNTER
----- Message from Jose Manuel Lomax sent at 3/10/2020  7:47 AM EDT -----  Regarding: [facility]bar[facility]   Contact: [phone]  03/10/20 7:47 AM    Hello, our patient Luis Daniel Grace has had Diabetic Eye Exam completed/performed  Please assist in updating the patient chart by making an External outreach to Dr Adam Nails facility located in [facility]  The date of service is 2 months ago      Thank you,  Jose Manuel Lomax  [facility]

## 2020-03-10 NOTE — LETTER
Diabetic Foot Exam Form    Date Requested: 03/10/20  Patient: Maggy Watkins  Patient : 1950   Referring Provider: Juana Orozco DO    Diabetic Foot Exam Performed with shoes and socks removed        Yes         No     Date of Diabetic Foot Exam ______________________________  Risk Score ____________________________________________    Left Foot       Visual Inspection         Monofilament Testing Sensory Exam        Pedal Pulses         Additional Comments         Right Foot      Visual Inspection         Monofilament Testing Sensory Exam       Pedal Pulses         Additional Comments         Comments __________________________________________________________    Practice Providing Exam ______________________________________________    Exam Performed By (print name) _______________________________________      Provider Signature ___________________________________________________      These reports are needed for  compliance    Please fax this completed form and a copy of the Diabetic Foot Exam report to our office located at [place] [address] as soon as possible to [phone] kurtis Flemingr: Phone [phone]    We thank you for your assistance in treating our mutual patient

## 2020-03-10 NOTE — LETTER
Diabetic Eye Exam Form    Date Requested: 03/10/20  Patient: Kitty Castro  Patient : 1950   Referring Provider: Alena Hallman,     Dilated Retinal Exam, Optomap-Iris Exam, or Fundus Photography Done         Yes ([place] one above)         No     Date of Diabetic Eye Exam ______________________________  Left Eye      Exam did show retinopathy    Exam did not show retinopathy         Mild       Moderate       None       Proliferative       Severe     Right Eye     Exam did show retinopathy    Exam did not show retinopathy         Mild       Moderate       None       Proliferative       Severe     Comments __________________________________________________________    Practice Providing Exam ______________________________________________    Exam Performed By (print name) _______________________________________      Provider Signature ___________________________________________________      These reports are needed for  compliance    Please fax this completed form and a copy of the Diabetic Eye Exam report to our office located at [place] [address] as soon as possible to [phone] kurtis Mccormack: Phone [phone]    We thank you for your assistance in treating our mutual patient

## 2020-03-10 NOTE — PROGRESS NOTES
Assessment and Plan:     Problem List Items Addressed This Visit        Endocrine    Diabetes mellitus ([facility] [address] ) - Primary    Relevant Orders    POCT hemoglobin A1c (Completed)          Tobacco Cessation Counseling: Tobacco cessation counseling was provided  The patient is sincerely urged to quit consumption of tobacco  He is not ready to quit tobacco  Medication options discussed  Side effects of medication not discussed  Patient refused medication  Tobacco use screening or tobacco cessation counseling was not performed due to other medical reasons  Preventive health issues were discussed with patient, and age appropriate screening tests were ordered as noted in patient's After Visit Summary  Personalized health advice and appropriate referrals for health education or preventive services given if needed, as noted in patient's After Visit Summary       History of Present Illness:     Patient presents for Medicare Annual Wellness visit    Patient Care Team:  Latoya Mena DO as PCP - General  MD Chloe Herrera DO Jolena Belt, MD Raliegh Speak, MD     Problem List:     Patient Active Problem List   Diagnosis    Lumbar radiculopathy    Ambulatory dysfunction    Foot drop, bilateral    Neuropathy, peripheral, idiopathic    Low vitamin B12 level    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 )    B12 neuropathy ([facility]    Diabetic neuropathy ([facility]    Dyslipidemia    Pulmonary nodule seen on imaging study    Transient cerebral ischemia      Past Medical and Surgical History:     Past Medical History:   Diagnosis Date    Abnormal CT of the abdomen     last assessed 9/30/16    Anemia     Diabetes mellitus ([facility] [address] )     Iron deficiency anemia     last assessed 4/26/16    TIA (transient ischemic attack)      Past Surgical History:   Procedure Laterality Date    EPIDURAL BLOCK INJECTION Bilateral 10/19/2017    Procedure: L5 TRANSFORAMINAL EPIDURAL STEROID INJECTION;  Surgeon: Ranjith Blanco DO;  Location: [facility];  Service: Pain Management       Family History:     No family history on file  Social History:        Social History     Socioeconomic History    Marital status:       Spouse name: None    Number of children: None    Years of education: None    Highest education level: None   Occupational History    None   Social Needs    Financial resource strain: None    Food insecurity:     Worry: None     Inability: None    Transportation needs:     Medical: None     Non-medical: None   Tobacco Use    Smoking status: Light Tobacco Smoker     Types: Cigars    Smokeless tobacco: Never Used    Tobacco comment: 1 a day   Substance and Sexual Activity    Alcohol use: Yes     Comment: social    Drug use: No    Sexual activity: None   Lifestyle    Physical activity:     Days per week: None     Minutes per session: None    Stress: None   Relationships    Social connections:     Talks on phone: None     Gets together: None     Attends [employer] service: None     Active member of club or organization: None     Attends meetings of clubs or organizations: None     Relationship status: None    Intimate partner violence:     Fear of current or ex partner: None     Emotionally abused: None     Physically abused: None     Forced sexual activity: None   Other Topics Concern    None   Social History Narrative    No advance directives    No living will      Medications and Allergies:     Current Outpatient Medications   Medication Sig Dispense Refill    ACCU-CHEK SOFTCLIX LANCETS lancets by Does not apply route 2 (two) times a day      Acetaminophen (TYLENOL) 325 MG CAPS Take 2 capsules by mouth 2 (two) times a day      aspirin-dipyridamole (AGGRENOX)  mg per 12 hr capsule TAKE ONE CAPSULE BY MOUTH EVERY 12 HOURS 180 capsule 1    ergocalciferol (VITAMIN D2) 50,000 units Take 1 capsule by mouth once a week      gabapentin (NEURONTIN) 300 mg capsule TAKE 1 CAPSULE BY MOUTH THREE TIMES A  capsule 1    glucose blood (ACCU-CHEK CAMILLA PLUS) test strip by In Vitro route 2 (two) times a day      metFORMIN (GLUCOPHAGE) 1000 MG tablet TAKE 1 TABLET BY MOUTH TWICE A DAY WITH MEALS 180 tablet 1    pravastatin (PRAVACHOL) 20 mg tablet TAKE 1 TABLET BY MOUTH AT BEDTIME 90 tablet 1     Current Facility-Administered Medications   Medication Dose Route Frequency Provider Last Rate Last Dose    cyanocobalamin injection 1,000 mcg  1,000 mcg Intramuscular Q30 Days Branden Fend, DO   1,000 mcg at 02/20/18 1400    cyanocobalamin injection 1,000 mcg  1,000 mcg Intramuscular Q30 Days Branden Fend, DO   1,000 mcg at 02/27/18 0806    cyanocobalamin injection 1,000 mcg  1,000 mcg Intramuscular Q30 Days Branden Fend, DO   1,000 mcg at 03/06/18 0800     No Known Allergies   Immunizations: There is no immunization history for the selected administration types on file for this patient  Health Maintenance:         Topic Date Due    Hepatitis C Screening  1950    CRC Screening: Colonoscopy  04/10/2020 (Originally 1950)         Topic Date Due    DTaP,Tdap,and Td Vaccines (1 - Tdap) 11/14/1961    Pneumococcal Vaccine: 65+ Years (1 of 2 - PCV13) 11/14/2015      Medicare Health Risk Assessment:     /80   Pulse 60   Temp 98 3 °F (36 8 °C)   Ht 6' (1 829 m)   Wt 83 6 kg (184 lb 3 2 oz)   SpO2 98%   BMI 24 98 kg/m²      Rosy Marte is here for his Subsequent Wellness visit  Health Risk Assessment:   Patient rates overall health as good  Patient feels that their physical health rating is same  Eyesight was rated as same  Hearing was rated as same  Patient feels that their emotional and mental health rating is same  Pain experienced in the last 7 days has been none  Patient states that he has experienced no weight loss or gain in last 6 months  Depression Screening:   PHQ-2 Score: 0      Fall Risk Screening:    In the past year, patient has experienced: no history of falling in past year      Home Safety:  Patient has trouble with stairs inside or outside of their home  Patient has working smoke alarms and has working carbon monoxide detector  Home safety hazards include: none  Nutrition:   Current diet is Regular  Medications:   Patient is currently taking over-the-counter supplements  OTC medications include: see medication list  Patient is able to manage medications  Activities of Daily Living (ADLs)/Instrumental Activities of Daily Living (IADLs):   Walk and transfer into and out of bed and chair?: Yes  Dress and groom yourself?: Yes    Bathe or shower yourself?: Yes    Feed yourself? Yes  Do your laundry/housekeeping?: Yes  Manage your money, pay your bills and track your expenses?: Yes  Make your own meals?: Yes    Do your own shopping?: Yes    Previous Hospitalizations:   Any hospitalizations or ED visits within the last 12 months?: No      Advance Care Planning:   Living will: No    Durable POA for healthcare: Yes    Advanced directive: No      Cognitive Screening:   Provider or family/friend/caregiver concerned regarding cognition?: No    PREVENTIVE SCREENINGS      Cardiovascular Screening:    General: Risks and Benefits Discussed    Due for: Lipid Panel      Diabetes Screening:     General: Screening Not Indicated and History Diabetes      Colorectal Cancer Screening:     General: Screening Current      Prostate Cancer Screening:    General: Patient Declines and Risks and Benefits Discussed      Osteoporosis Screening:    General: Patient Declines and Risks and Benefits Discussed      Abdominal Aortic Aneurysm (AAA) Screening:    Risk factors include: age between 73-69 yo and tobacco use        General: Risks and Benefits Discussed      Lung Cancer Screening:     General: Screening Not Indicated      Hepatitis C Screening:    General: Patient Declines      Vinod Vega PA-C     Pt refuses all forms of colon CA screening   He is happy today with an A1C of 8 8%  He refuses any additional DM medications  He says he has to stop eating potato chips  With spring coming, 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

## 2020-03-10 NOTE — TELEPHONE ENCOUNTER
Foot:  Upon review of the In Basket request and the patient's chart, initial outreach has been made via fax, please see Contacts section for details  A second outreach attempt will be made within 3 business days      Thank you  Marilee Randhawa

## 2020-03-10 NOTE — TELEPHONE ENCOUNTER
Eye:  Upon review of the In Basket request and the patient's chart, initial outreach has been made via fax, please see Contacts section for details  A second outreach attempt will be made within 3 business days      Thank you  Florence Villalobos

## 2020-03-11 NOTE — TELEPHONE ENCOUNTER
Upon review of the In Basket request we were able to locate, review, and update the patient chart as requested for Diabetic Foot Exam     Any additional questions or concerns should be emailed to the Practice Liaisons via [email or web address] email, please do not reply via In Basket      Thank you  Aniceto Simon

## 2020-03-11 NOTE — TELEPHONE ENCOUNTER
Upon review of the In Basket request we were able to locate, review, and update the patient chart as requested for Diabetic Eye Exam     Any additional questions or concerns should be emailed to the Practice Liaisons via [email or web address] email, please do not reply via In Basket      Thank you  Anahy Vera

## 2020-06-03 DIAGNOSIS — G60.9 IDIOPATHIC PERIPHERAL NEUROPATHY: ICD-10-CM

## 2020-06-03 DIAGNOSIS — G45.8 OTHER SPECIFIED TRANSIENT CEREBRAL ISCHEMIAS: ICD-10-CM

## 2020-06-03 DIAGNOSIS — E11.9 TYPE 2 DIABETES MELLITUS WITHOUT COMPLICATION, WITHOUT LONG-TERM CURRENT USE OF INSULIN (HCC): ICD-10-CM

## 2020-06-03 RX ORDER — ASPIRIN AND DIPYRIDAMOLE 25; 200 MG/1; MG/1
CAPSULE, EXTENDED RELEASE ORAL
Qty: 180 CAPSULE | Refills: 1 | Status: SHIPPED | OUTPATIENT
Start: 2020-06-03 | End: 2020-12-22

## 2020-06-03 RX ORDER — GABAPENTIN 300 MG/1
CAPSULE ORAL
Qty: 270 CAPSULE | Refills: 1 | Status: SHIPPED | OUTPATIENT
Start: 2020-06-03

## 2020-10-15 ENCOUNTER — TELEPHONE (OUTPATIENT)
Dept: FAMILY MEDICINE CLINIC | Facility: CLINIC | Age: 70
End: 2020-10-15

## 2020-10-28 ENCOUNTER — OFFICE VISIT (OUTPATIENT)
Dept: FAMILY MEDICINE CLINIC | Facility: CLINIC | Age: 70
End: 2020-10-28
Payer: COMMERCIAL

## 2020-10-28 VITALS
BODY MASS INDEX: 24.92 KG/M2 | DIASTOLIC BLOOD PRESSURE: 82 MMHG | HEIGHT: 72 IN | TEMPERATURE: 96.2 F | WEIGHT: 184 LBS | SYSTOLIC BLOOD PRESSURE: 162 MMHG

## 2020-10-28 DIAGNOSIS — R53.82 CHRONIC FATIGUE: ICD-10-CM

## 2020-10-28 DIAGNOSIS — N18.32 TYPE 2 DIABETES MELLITUS WITH STAGE 3B CHRONIC KIDNEY DISEASE, UNSPECIFIED WHETHER LONG TERM INSULIN USE (HCC): Primary | ICD-10-CM

## 2020-10-28 DIAGNOSIS — Z23 NEED FOR IMMUNIZATION AGAINST INFLUENZA: ICD-10-CM

## 2020-10-28 DIAGNOSIS — E11.22 TYPE 2 DIABETES MELLITUS WITH STAGE 3B CHRONIC KIDNEY DISEASE, UNSPECIFIED WHETHER LONG TERM INSULIN USE (HCC): Primary | ICD-10-CM

## 2020-10-28 PROCEDURE — 90682 RIV4 VACC RECOMBINANT DNA IM: CPT | Performed by: FAMILY MEDICINE

## 2020-10-28 PROCEDURE — G0008 ADMIN INFLUENZA VIRUS VAC: HCPCS | Performed by: FAMILY MEDICINE

## 2020-10-28 PROCEDURE — 1160F RVW MEDS BY RX/DR IN RCRD: CPT | Performed by: FAMILY MEDICINE

## 2020-10-28 PROCEDURE — 3008F BODY MASS INDEX DOCD: CPT | Performed by: FAMILY MEDICINE

## 2020-10-28 PROCEDURE — 3066F NEPHROPATHY DOC TX: CPT | Performed by: FAMILY MEDICINE

## 2020-10-28 PROCEDURE — 99214 OFFICE O/P EST MOD 30 MIN: CPT | Performed by: FAMILY MEDICINE

## 2020-11-17 LAB
LEFT EYE DIABETIC RETINOPATHY: NORMAL
RIGHT EYE DIABETIC RETINOPATHY: NORMAL

## 2020-12-22 DIAGNOSIS — E11.9 TYPE 2 DIABETES MELLITUS WITHOUT COMPLICATION, WITHOUT LONG-TERM CURRENT USE OF INSULIN (HCC): ICD-10-CM

## 2020-12-22 DIAGNOSIS — G45.8 OTHER SPECIFIED TRANSIENT CEREBRAL ISCHEMIAS: ICD-10-CM

## 2020-12-22 RX ORDER — ASPIRIN AND DIPYRIDAMOLE 25; 200 MG/1; MG/1
CAPSULE, EXTENDED RELEASE ORAL
Qty: 180 CAPSULE | Refills: 1 | Status: SHIPPED | OUTPATIENT
Start: 2020-12-22 | End: 2021-11-03 | Stop reason: SDUPTHER

## 2020-12-23 ENCOUNTER — VBI (OUTPATIENT)
Dept: ADMINISTRATIVE | Facility: OTHER | Age: 70
End: 2020-12-23

## 2021-03-09 DIAGNOSIS — Z23 ENCOUNTER FOR IMMUNIZATION: ICD-10-CM

## 2021-03-18 ENCOUNTER — IMMUNIZATIONS (OUTPATIENT)
Dept: FAMILY MEDICINE CLINIC | Facility: HOSPITAL | Age: 71
End: 2021-03-18

## 2021-03-18 DIAGNOSIS — Z23 ENCOUNTER FOR IMMUNIZATION: Primary | ICD-10-CM

## 2021-03-18 PROCEDURE — 91301 SARS-COV-2 / COVID-19 MRNA VACCINE (MODERNA) 100 MCG: CPT

## 2021-03-18 PROCEDURE — 0011A SARS-COV-2 / COVID-19 MRNA VACCINE (MODERNA) 100 MCG: CPT

## 2021-04-13 ENCOUNTER — VBI (OUTPATIENT)
Dept: ADMINISTRATIVE | Facility: OTHER | Age: 71
End: 2021-04-13

## 2021-04-15 ENCOUNTER — IMMUNIZATIONS (OUTPATIENT)
Dept: FAMILY MEDICINE CLINIC | Facility: HOSPITAL | Age: 71
End: 2021-04-15

## 2021-04-15 DIAGNOSIS — Z23 ENCOUNTER FOR IMMUNIZATION: Primary | ICD-10-CM

## 2021-04-15 PROCEDURE — 91301 SARS-COV-2 / COVID-19 MRNA VACCINE (MODERNA) 100 MCG: CPT

## 2021-04-15 PROCEDURE — 0012A SARS-COV-2 / COVID-19 MRNA VACCINE (MODERNA) 100 MCG: CPT

## 2021-06-10 ENCOUNTER — VBI (OUTPATIENT)
Dept: ADMINISTRATIVE | Facility: OTHER | Age: 71
End: 2021-06-10

## 2021-06-11 ENCOUNTER — VBI (OUTPATIENT)
Dept: ADMINISTRATIVE | Facility: OTHER | Age: 71
End: 2021-06-11

## 2021-08-16 ENCOUNTER — VBI (OUTPATIENT)
Dept: ADMINISTRATIVE | Facility: OTHER | Age: 71
End: 2021-08-16

## 2021-08-24 ENCOUNTER — VBI (OUTPATIENT)
Dept: ADMINISTRATIVE | Facility: OTHER | Age: 71
End: 2021-08-24

## 2021-08-24 NOTE — TELEPHONE ENCOUNTER
08/24/21 11:15 AM     See documentation in the VB [employer] SmartForm       [facility] Radha

## 2021-09-08 ENCOUNTER — VBI (OUTPATIENT)
Dept: ADMINISTRATIVE | Facility: OTHER | Age: 71
End: 2021-09-08

## 2021-09-14 ENCOUNTER — VBI (OUTPATIENT)
Dept: ADMINISTRATIVE | Facility: OTHER | Age: 71
End: 2021-09-14

## 2021-09-14 NOTE — TELEPHONE ENCOUNTER
09/14/21 2:27 PM     [facility]       Fidel Greenhouse

## 2021-09-22 ENCOUNTER — VBI (OUTPATIENT)
Dept: ADMINISTRATIVE | Facility: OTHER | Age: 71
End: 2021-09-22

## 2021-10-08 ENCOUNTER — VBI (OUTPATIENT)
Dept: ADMINISTRATIVE | Facility: OTHER | Age: 71
End: 2021-10-08

## 2021-10-11 ENCOUNTER — OFFICE VISIT (OUTPATIENT)
Dept: FAMILY MEDICINE CLINIC | Facility: CLINIC | Age: 71
End: 2021-10-11
Payer: COMMERCIAL

## 2021-10-11 ENCOUNTER — TELEPHONE (OUTPATIENT)
Dept: ADMINISTRATIVE | Facility: OTHER | Age: 71
End: 2021-10-11

## 2021-10-11 VITALS
SYSTOLIC BLOOD PRESSURE: 148 MMHG | WEIGHT: 164 LBS | BODY MASS INDEX: 22.21 KG/M2 | TEMPERATURE: 96.8 F | HEIGHT: 72 IN | DIASTOLIC BLOOD PRESSURE: 66 MMHG

## 2021-10-11 DIAGNOSIS — G61.81 CIDP (CHRONIC INFLAMMATORY DEMYELINATING POLYNEUROPATHY) (HCC): ICD-10-CM

## 2021-10-11 DIAGNOSIS — R63.4 RECENT UNEXPLAINED WEIGHT LOSS: ICD-10-CM

## 2021-10-11 DIAGNOSIS — N18.32 TYPE 2 DIABETES MELLITUS WITH STAGE 3B CHRONIC KIDNEY DISEASE, UNSPECIFIED WHETHER LONG TERM INSULIN USE (HCC): Primary | ICD-10-CM

## 2021-10-11 DIAGNOSIS — B86 SCABIES: ICD-10-CM

## 2021-10-11 DIAGNOSIS — L30.9 ECZEMA, UNSPECIFIED TYPE: ICD-10-CM

## 2021-10-11 DIAGNOSIS — E11.22 TYPE 2 DIABETES MELLITUS WITH STAGE 3B CHRONIC KIDNEY DISEASE, UNSPECIFIED WHETHER LONG TERM INSULIN USE (HCC): Primary | ICD-10-CM

## 2021-10-11 DIAGNOSIS — M10.9 ACUTE GOUT OF RIGHT FOOT, UNSPECIFIED CAUSE: ICD-10-CM

## 2021-10-11 DIAGNOSIS — E11.43 DIABETIC AUTONOMIC NEUROPATHY ASSOCIATED WITH TYPE 2 DIABETES MELLITUS (HCC): ICD-10-CM

## 2021-10-11 PROCEDURE — 1160F RVW MEDS BY RX/DR IN RCRD: CPT | Performed by: FAMILY MEDICINE

## 2021-10-11 PROCEDURE — 99214 OFFICE O/P EST MOD 30 MIN: CPT | Performed by: FAMILY MEDICINE

## 2021-10-11 PROCEDURE — 1036F TOBACCO NON-USER: CPT | Performed by: FAMILY MEDICINE

## 2021-10-11 PROCEDURE — 3008F BODY MASS INDEX DOCD: CPT | Performed by: FAMILY MEDICINE

## 2021-10-11 PROCEDURE — 3066F NEPHROPATHY DOC TX: CPT | Performed by: FAMILY MEDICINE

## 2021-10-11 RX ORDER — PERMETHRIN 50 MG/G
CREAM TOPICAL
Qty: 60 G | Refills: 0 | Status: SHIPPED | OUTPATIENT
Start: 2021-10-11 | End: 2021-11-17

## 2021-10-11 RX ORDER — COLCHICINE 0.6 MG/1
TABLET ORAL
Qty: 14 TABLET | Refills: 0 | Status: SHIPPED | OUTPATIENT
Start: 2021-10-11 | End: 2021-10-26 | Stop reason: ALTCHOICE

## 2021-10-12 ENCOUNTER — LAB (OUTPATIENT)
Dept: LAB | Facility: MEDICAL CENTER | Age: 71
End: 2021-10-12
Payer: COMMERCIAL

## 2021-10-12 ENCOUNTER — APPOINTMENT (OUTPATIENT)
Dept: RADIOLOGY | Facility: MEDICAL CENTER | Age: 71
End: 2021-10-12
Payer: COMMERCIAL

## 2021-10-12 DIAGNOSIS — M10.9 ACUTE GOUT OF RIGHT FOOT, UNSPECIFIED CAUSE: ICD-10-CM

## 2021-10-12 DIAGNOSIS — E11.9 TYPE 2 DIABETES MELLITUS WITHOUT COMPLICATIONS (HCC): ICD-10-CM

## 2021-10-12 DIAGNOSIS — R63.4 RECENT UNEXPLAINED WEIGHT LOSS: ICD-10-CM

## 2021-10-12 DIAGNOSIS — E11.9 TYPE 2 DIABETES MELLITUS WITHOUT COMPLICATION, WITHOUT LONG-TERM CURRENT USE OF INSULIN (HCC): Primary | ICD-10-CM

## 2021-10-12 LAB
ALBUMIN SERPL BCP-MCNC: 3.6 G/DL (ref 3.5–5)
ALP SERPL-CCNC: 86 U/L (ref 46–116)
ALT SERPL W P-5'-P-CCNC: 25 U/L (ref 12–78)
ANION GAP SERPL CALCULATED.3IONS-SCNC: 4 MMOL/L (ref 4–13)
AST SERPL W P-5'-P-CCNC: 12 U/L (ref 5–45)
BILIRUB SERPL-MCNC: 0.64 MG/DL (ref 0.2–1)
BUN SERPL-MCNC: 14 MG/DL (ref 5–25)
CALCIUM SERPL-MCNC: 9.7 MG/DL (ref 8.3–10.1)
CHLORIDE SERPL-SCNC: 100 MMOL/L (ref 100–108)
CHOLEST SERPL-MCNC: 178 MG/DL (ref 50–200)
CO2 SERPL-SCNC: 29 MMOL/L (ref 21–32)
CREAT SERPL-MCNC: 0.96 MG/DL (ref 0.6–1.3)
CREAT UR-MCNC: 86.3 MG/DL
ERYTHROCYTE [DISTWIDTH] IN BLOOD BY AUTOMATED COUNT: 13 % (ref 11.6–15.1)
EST. AVERAGE GLUCOSE BLD GHB EST-MCNC: 266 MG/DL
GFR SERPL CREATININE-BSD FRML MDRD: 80 ML/MIN/1.73SQ M
GLUCOSE P FAST SERPL-MCNC: 291 MG/DL (ref 65–99)
HBA1C MFR BLD: 10.9 %
HCT VFR BLD AUTO: 55.7 % (ref 36.5–49.3)
HDLC SERPL-MCNC: 49 MG/DL
HGB BLD-MCNC: 18.6 G/DL (ref 12–17)
LDLC SERPL CALC-MCNC: 106 MG/DL (ref 0–100)
MCH RBC QN AUTO: 30.9 PG (ref 26.8–34.3)
MCHC RBC AUTO-ENTMCNC: 33.4 G/DL (ref 31.4–37.4)
MCV RBC AUTO: 93 FL (ref 82–98)
MICROALBUMIN UR-MCNC: 65.3 MG/L (ref 0–20)
MICROALBUMIN/CREAT 24H UR: 76 MG/G CREATININE (ref 0–30)
NONHDLC SERPL-MCNC: 129 MG/DL
PLATELET # BLD AUTO: 252 THOUSANDS/UL (ref 149–390)
PMV BLD AUTO: 10.7 FL (ref 8.9–12.7)
POTASSIUM SERPL-SCNC: 4.8 MMOL/L (ref 3.5–5.3)
PROT SERPL-MCNC: 7.4 G/DL (ref 6.4–8.2)
PSA SERPL-MCNC: 0.7 NG/ML (ref 0–4)
RBC # BLD AUTO: 6.01 MILLION/UL (ref 3.88–5.62)
SODIUM SERPL-SCNC: 133 MMOL/L (ref 136–145)
TRIGL SERPL-MCNC: 113 MG/DL
TSH SERPL DL<=0.05 MIU/L-ACNC: 1.45 UIU/ML (ref 0.36–3.74)
URATE SERPL-MCNC: 2.8 MG/DL (ref 4.2–8)
WBC # BLD AUTO: 9.49 THOUSAND/UL (ref 4.31–10.16)

## 2021-10-12 PROCEDURE — 36415 COLL VENOUS BLD VENIPUNCTURE: CPT | Performed by: FAMILY MEDICINE

## 2021-10-12 PROCEDURE — 83036 HEMOGLOBIN GLYCOSYLATED A1C: CPT | Performed by: FAMILY MEDICINE

## 2021-10-12 PROCEDURE — 82043 UR ALBUMIN QUANTITATIVE: CPT | Performed by: FAMILY MEDICINE

## 2021-10-12 PROCEDURE — 80053 COMPREHEN METABOLIC PANEL: CPT | Performed by: FAMILY MEDICINE

## 2021-10-12 PROCEDURE — 3046F HEMOGLOBIN A1C LEVEL >9.0%: CPT | Performed by: FAMILY MEDICINE

## 2021-10-12 PROCEDURE — 71046 X-RAY EXAM CHEST 2 VIEWS: CPT

## 2021-10-12 PROCEDURE — 84443 ASSAY THYROID STIM HORMONE: CPT | Performed by: FAMILY MEDICINE

## 2021-10-12 PROCEDURE — 82570 ASSAY OF URINE CREATININE: CPT | Performed by: FAMILY MEDICINE

## 2021-10-12 PROCEDURE — G0103 PSA SCREENING: HCPCS

## 2021-10-12 PROCEDURE — 80061 LIPID PANEL: CPT | Performed by: FAMILY MEDICINE

## 2021-10-12 PROCEDURE — 73630 X-RAY EXAM OF FOOT: CPT

## 2021-10-12 PROCEDURE — 3060F POS MICROALBUMINURIA REV: CPT | Performed by: FAMILY MEDICINE

## 2021-10-12 PROCEDURE — 85027 COMPLETE CBC AUTOMATED: CPT | Performed by: FAMILY MEDICINE

## 2021-10-12 PROCEDURE — 84550 ASSAY OF BLOOD/URIC ACID: CPT

## 2021-10-12 RX ORDER — DAPAGLIFLOZIN 5 MG/1
5 TABLET, FILM COATED ORAL DAILY
Qty: 30 TABLET | Refills: 3 | Status: SHIPPED | OUTPATIENT
Start: 2021-10-12 | End: 2021-12-06 | Stop reason: SDUPTHER

## 2021-10-13 ENCOUNTER — VBI (OUTPATIENT)
Dept: ADMINISTRATIVE | Facility: OTHER | Age: 71
End: 2021-10-13

## 2021-10-21 ENCOUNTER — TELEPHONE (OUTPATIENT)
Dept: FAMILY MEDICINE CLINIC | Facility: CLINIC | Age: 71
End: 2021-10-21

## 2021-10-22 DIAGNOSIS — M10.9 ACUTE GOUT INVOLVING TOE OF RIGHT FOOT, UNSPECIFIED CAUSE: Primary | ICD-10-CM

## 2021-10-26 ENCOUNTER — HOSPITAL ENCOUNTER (INPATIENT)
Facility: HOSPITAL | Age: 71
LOS: 4 days | Discharge: HOME | DRG: 254 | End: 2021-10-30
Attending: EMERGENCY MEDICINE | Admitting: INTERNAL MEDICINE
Payer: COMMERCIAL

## 2021-10-26 ENCOUNTER — HOSPITAL ENCOUNTER (OUTPATIENT)
Dept: NON INVASIVE DIAGNOSTICS | Facility: HOSPITAL | Age: 71
Discharge: HOME | End: 2021-10-26
Attending: PODIATRIST
Payer: COMMERCIAL

## 2021-10-26 DIAGNOSIS — M79.671 RIGHT FOOT PAIN: Primary | ICD-10-CM

## 2021-10-26 DIAGNOSIS — I70.221 ATHEROSCLEROSIS OF NATIVE ARTERIES OF EXTREMITIES WITH REST PAIN, RIGHT LEG (HCC): ICD-10-CM

## 2021-10-26 DIAGNOSIS — E11.51 DIABETIC PERIPHERAL VASCULAR DISORDER (HCC): ICD-10-CM

## 2021-10-26 DIAGNOSIS — I70.90 ARTERIAL OCCLUSIVE DISEASE: ICD-10-CM

## 2021-10-26 DIAGNOSIS — R73.9 HYPERGLYCEMIA: ICD-10-CM

## 2021-10-26 DIAGNOSIS — E78.5 DYSLIPIDEMIA: ICD-10-CM

## 2021-10-26 DIAGNOSIS — I73.9 PAD (PERIPHERAL ARTERY DISEASE) (HCC): ICD-10-CM

## 2021-10-26 PROBLEM — I99.8 PAIN OF RIGHT LOWER EXTREMITY DUE TO ISCHEMIA: Status: ACTIVE | Noted: 2021-10-26

## 2021-10-26 PROBLEM — M79.604 PAIN OF RIGHT LOWER EXTREMITY DUE TO ISCHEMIA: Status: ACTIVE | Noted: 2021-10-26

## 2021-10-26 LAB
ANION GAP SERPL CALCULATED.3IONS-SCNC: 10 MMOL/L (ref 4–13)
APTT PPP: 27 SECONDS (ref 23–37)
BASOPHILS # BLD AUTO: 0.02 THOUSANDS/ΜL (ref 0–0.1)
BASOPHILS NFR BLD AUTO: 0 % (ref 0–1)
BUN SERPL-MCNC: 18 MG/DL (ref 5–25)
CALCIUM SERPL-MCNC: 9 MG/DL (ref 8.3–10.1)
CHLORIDE SERPL-SCNC: 99 MMOL/L (ref 100–108)
CO2 SERPL-SCNC: 26 MMOL/L (ref 21–32)
CREAT SERPL-MCNC: 1.01 MG/DL (ref 0.6–1.3)
EOSINOPHIL # BLD AUTO: 0.03 THOUSAND/ΜL (ref 0–0.61)
EOSINOPHIL NFR BLD AUTO: 0 % (ref 0–6)
ERYTHROCYTE [DISTWIDTH] IN BLOOD BY AUTOMATED COUNT: 12.6 % (ref 11.6–15.1)
GFR SERPL CREATININE-BSD FRML MDRD: 75 ML/MIN/1.73SQ M
GLUCOSE SERPL-MCNC: 304 MG/DL (ref 65–140)
GLUCOSE SERPL-MCNC: 348 MG/DL (ref 65–140)
HCT VFR BLD AUTO: 48.3 % (ref 36.5–49.3)
HGB BLD-MCNC: 16.8 G/DL (ref 12–17)
IMM GRANULOCYTES # BLD AUTO: 0.04 THOUSAND/UL (ref 0–0.2)
IMM GRANULOCYTES NFR BLD AUTO: 0 % (ref 0–2)
INR PPP: 1.02 (ref 0.84–1.19)
LYMPHOCYTES # BLD AUTO: 1.73 THOUSANDS/ΜL (ref 0.6–4.47)
LYMPHOCYTES NFR BLD AUTO: 17 % (ref 14–44)
MCH RBC QN AUTO: 31.6 PG (ref 26.8–34.3)
MCHC RBC AUTO-ENTMCNC: 34.8 G/DL (ref 31.4–37.4)
MCV RBC AUTO: 91 FL (ref 82–98)
MONOCYTES # BLD AUTO: 0.74 THOUSAND/ΜL (ref 0.17–1.22)
MONOCYTES NFR BLD AUTO: 7 % (ref 4–12)
NEUTROPHILS # BLD AUTO: 7.88 THOUSANDS/ΜL (ref 1.85–7.62)
NEUTS SEG NFR BLD AUTO: 76 % (ref 43–75)
NRBC BLD AUTO-RTO: 0 /100 WBCS
PLATELET # BLD AUTO: 212 THOUSANDS/UL (ref 149–390)
PMV BLD AUTO: 10.4 FL (ref 8.9–12.7)
POTASSIUM SERPL-SCNC: 4.3 MMOL/L (ref 3.5–5.3)
PROTHROMBIN TIME: 13.2 SECONDS (ref 11.6–14.5)
RBC # BLD AUTO: 5.32 MILLION/UL (ref 3.88–5.62)
SODIUM SERPL-SCNC: 135 MMOL/L (ref 136–145)
WBC # BLD AUTO: 10.44 THOUSAND/UL (ref 4.31–10.16)

## 2021-10-26 PROCEDURE — 80048 BASIC METABOLIC PNL TOTAL CA: CPT | Performed by: EMERGENCY MEDICINE

## 2021-10-26 PROCEDURE — 85025 COMPLETE CBC W/AUTO DIFF WBC: CPT | Performed by: EMERGENCY MEDICINE

## 2021-10-26 PROCEDURE — 99284 EMERGENCY DEPT VISIT MOD MDM: CPT

## 2021-10-26 PROCEDURE — 93925 LOWER EXTREMITY STUDY: CPT

## 2021-10-26 PROCEDURE — 36415 COLL VENOUS BLD VENIPUNCTURE: CPT | Performed by: EMERGENCY MEDICINE

## 2021-10-26 PROCEDURE — 82948 REAGENT STRIP/BLOOD GLUCOSE: CPT

## 2021-10-26 PROCEDURE — 99223 1ST HOSP IP/OBS HIGH 75: CPT | Performed by: INTERNAL MEDICINE

## 2021-10-26 PROCEDURE — 93925 LOWER EXTREMITY STUDY: CPT | Performed by: SURGERY

## 2021-10-26 PROCEDURE — 85610 PROTHROMBIN TIME: CPT | Performed by: EMERGENCY MEDICINE

## 2021-10-26 PROCEDURE — 85730 THROMBOPLASTIN TIME PARTIAL: CPT | Performed by: EMERGENCY MEDICINE

## 2021-10-26 PROCEDURE — 99285 EMERGENCY DEPT VISIT HI MDM: CPT | Performed by: EMERGENCY MEDICINE

## 2021-10-26 PROCEDURE — 93922 UPR/L XTREMITY ART 2 LEVELS: CPT | Performed by: SURGERY

## 2021-10-26 PROCEDURE — 93923 UPR/LXTR ART STDY 3+ LVLS: CPT

## 2021-10-26 RX ORDER — HEPARIN SODIUM 1000 [USP'U]/ML
5600 INJECTION, SOLUTION INTRAVENOUS; SUBCUTANEOUS
Status: DISCONTINUED | OUTPATIENT
Start: 2021-10-26 | End: 2021-10-26

## 2021-10-26 RX ORDER — HEPARIN SODIUM 1000 [USP'U]/ML
5600 INJECTION, SOLUTION INTRAVENOUS; SUBCUTANEOUS ONCE
Status: DISCONTINUED | OUTPATIENT
Start: 2021-10-26 | End: 2021-10-26

## 2021-10-26 RX ORDER — ATORVASTATIN CALCIUM 80 MG/1
80 TABLET, FILM COATED ORAL
Status: DISCONTINUED | OUTPATIENT
Start: 2021-10-26 | End: 2021-10-30 | Stop reason: HOSPADM

## 2021-10-26 RX ORDER — OXYCODONE HYDROCHLORIDE 10 MG/1
10 TABLET ORAL EVERY 4 HOURS PRN
Status: DISCONTINUED | OUTPATIENT
Start: 2021-10-26 | End: 2021-10-30 | Stop reason: HOSPADM

## 2021-10-26 RX ORDER — HEPARIN SODIUM 1000 [USP'U]/ML
2800 INJECTION, SOLUTION INTRAVENOUS; SUBCUTANEOUS
Status: DISCONTINUED | OUTPATIENT
Start: 2021-10-26 | End: 2021-10-26

## 2021-10-26 RX ORDER — ONDANSETRON 2 MG/ML
4 INJECTION INTRAMUSCULAR; INTRAVENOUS EVERY 6 HOURS PRN
Status: DISCONTINUED | OUTPATIENT
Start: 2021-10-26 | End: 2021-10-30 | Stop reason: HOSPADM

## 2021-10-26 RX ORDER — CALCIUM CARBONATE 200(500)MG
1000 TABLET,CHEWABLE ORAL DAILY PRN
Status: DISCONTINUED | OUTPATIENT
Start: 2021-10-26 | End: 2021-10-30 | Stop reason: HOSPADM

## 2021-10-26 RX ORDER — HEPARIN SODIUM 10000 [USP'U]/100ML
3-30 INJECTION, SOLUTION INTRAVENOUS
Status: DISCONTINUED | OUTPATIENT
Start: 2021-10-26 | End: 2021-10-26

## 2021-10-26 RX ORDER — ASPIRIN AND DIPYRIDAMOLE 25; 200 MG/1; MG/1
1 CAPSULE, EXTENDED RELEASE ORAL EVERY 12 HOURS
Status: DISCONTINUED | OUTPATIENT
Start: 2021-10-26 | End: 2021-10-30 | Stop reason: HOSPADM

## 2021-10-26 RX ORDER — OXYCODONE HYDROCHLORIDE 5 MG/1
5 TABLET ORAL EVERY 4 HOURS PRN
Status: DISCONTINUED | OUTPATIENT
Start: 2021-10-26 | End: 2021-10-30 | Stop reason: HOSPADM

## 2021-10-26 RX ORDER — HYDROMORPHONE HCL/PF 1 MG/ML
0.5 SYRINGE (ML) INJECTION
Status: DISCONTINUED | OUTPATIENT
Start: 2021-10-26 | End: 2021-10-30 | Stop reason: HOSPADM

## 2021-10-26 RX ORDER — GABAPENTIN 300 MG/1
300 CAPSULE ORAL 3 TIMES DAILY
Status: DISCONTINUED | OUTPATIENT
Start: 2021-10-26 | End: 2021-10-30 | Stop reason: HOSPADM

## 2021-10-26 RX ORDER — SODIUM CHLORIDE, SODIUM GLUCONATE, SODIUM ACETATE, POTASSIUM CHLORIDE, MAGNESIUM CHLORIDE, SODIUM PHOSPHATE, DIBASIC, AND POTASSIUM PHOSPHATE .53; .5; .37; .037; .03; .012; .00082 G/100ML; G/100ML; G/100ML; G/100ML; G/100ML; G/100ML; G/100ML
75 INJECTION, SOLUTION INTRAVENOUS CONTINUOUS
Status: DISCONTINUED | OUTPATIENT
Start: 2021-10-26 | End: 2021-10-27

## 2021-10-26 RX ORDER — INSULIN GLARGINE 100 [IU]/ML
15 INJECTION, SOLUTION SUBCUTANEOUS
Status: DISCONTINUED | OUTPATIENT
Start: 2021-10-26 | End: 2021-10-27

## 2021-10-26 RX ORDER — DOCUSATE SODIUM 100 MG/1
100 CAPSULE, LIQUID FILLED ORAL 2 TIMES DAILY
Status: DISCONTINUED | OUTPATIENT
Start: 2021-10-26 | End: 2021-10-30 | Stop reason: HOSPADM

## 2021-10-26 RX ORDER — ACETAMINOPHEN 325 MG/1
650 TABLET ORAL EVERY 6 HOURS PRN
Status: DISCONTINUED | OUTPATIENT
Start: 2021-10-26 | End: 2021-10-30 | Stop reason: HOSPADM

## 2021-10-26 RX ADMIN — INSULIN LISPRO 5 UNITS: 100 INJECTION, SOLUTION INTRAVENOUS; SUBCUTANEOUS at 22:00

## 2021-10-26 RX ADMIN — ASPIRIN AND EXTENDED-RELEASE DIPYRIDAMOLE 1 CAPSULE: 25; 200 CAPSULE ORAL at 18:09

## 2021-10-26 RX ADMIN — INSULIN GLARGINE 15 UNITS: 100 INJECTION, SOLUTION SUBCUTANEOUS at 22:00

## 2021-10-26 RX ADMIN — SODIUM CHLORIDE, SODIUM GLUCONATE, SODIUM ACETATE, POTASSIUM CHLORIDE, MAGNESIUM CHLORIDE, SODIUM PHOSPHATE, DIBASIC, AND POTASSIUM PHOSPHATE 75 ML/HR: .53; .5; .37; .037; .03; .012; .00082 INJECTION, SOLUTION INTRAVENOUS at 18:06

## 2021-10-26 RX ADMIN — GABAPENTIN 300 MG: 300 CAPSULE ORAL at 22:01

## 2021-10-26 RX ADMIN — ATORVASTATIN CALCIUM 80 MG: 80 TABLET, FILM COATED ORAL at 18:09

## 2021-10-27 LAB
ANION GAP SERPL CALCULATED.3IONS-SCNC: 8 MMOL/L (ref 4–13)
BUN SERPL-MCNC: 19 MG/DL (ref 5–25)
CALCIUM SERPL-MCNC: 8.7 MG/DL (ref 8.3–10.1)
CHLORIDE SERPL-SCNC: 102 MMOL/L (ref 100–108)
CO2 SERPL-SCNC: 28 MMOL/L (ref 21–32)
CREAT SERPL-MCNC: 0.96 MG/DL (ref 0.6–1.3)
ERYTHROCYTE [DISTWIDTH] IN BLOOD BY AUTOMATED COUNT: 12.6 % (ref 11.6–15.1)
GFR SERPL CREATININE-BSD FRML MDRD: 80 ML/MIN/1.73SQ M
GLUCOSE SERPL-MCNC: 164 MG/DL (ref 65–140)
GLUCOSE SERPL-MCNC: 209 MG/DL (ref 65–140)
GLUCOSE SERPL-MCNC: 222 MG/DL (ref 65–140)
GLUCOSE SERPL-MCNC: 227 MG/DL (ref 65–140)
GLUCOSE SERPL-MCNC: 320 MG/DL (ref 65–140)
HCT VFR BLD AUTO: 47.3 % (ref 36.5–49.3)
HGB BLD-MCNC: 16.1 G/DL (ref 12–17)
MCH RBC QN AUTO: 30.6 PG (ref 26.8–34.3)
MCHC RBC AUTO-ENTMCNC: 34 G/DL (ref 31.4–37.4)
MCV RBC AUTO: 90 FL (ref 82–98)
PLATELET # BLD AUTO: 222 THOUSANDS/UL (ref 149–390)
PMV BLD AUTO: 10.8 FL (ref 8.9–12.7)
POTASSIUM SERPL-SCNC: 3.9 MMOL/L (ref 3.5–5.3)
RBC # BLD AUTO: 5.27 MILLION/UL (ref 3.88–5.62)
SODIUM SERPL-SCNC: 138 MMOL/L (ref 136–145)
WBC # BLD AUTO: 8.12 THOUSAND/UL (ref 4.31–10.16)

## 2021-10-27 PROCEDURE — 82948 REAGENT STRIP/BLOOD GLUCOSE: CPT

## 2021-10-27 PROCEDURE — 85027 COMPLETE CBC AUTOMATED: CPT | Performed by: PHYSICIAN ASSISTANT

## 2021-10-27 PROCEDURE — 97166 OT EVAL MOD COMPLEX 45 MIN: CPT

## 2021-10-27 PROCEDURE — 97163 PT EVAL HIGH COMPLEX 45 MIN: CPT

## 2021-10-27 PROCEDURE — 99232 SBSQ HOSP IP/OBS MODERATE 35: CPT | Performed by: SURGERY

## 2021-10-27 PROCEDURE — 80048 BASIC METABOLIC PNL TOTAL CA: CPT | Performed by: PHYSICIAN ASSISTANT

## 2021-10-27 PROCEDURE — 99232 SBSQ HOSP IP/OBS MODERATE 35: CPT | Performed by: INTERNAL MEDICINE

## 2021-10-27 RX ORDER — INSULIN GLARGINE 100 [IU]/ML
20 INJECTION, SOLUTION SUBCUTANEOUS
Status: DISCONTINUED | OUTPATIENT
Start: 2021-10-27 | End: 2021-10-28

## 2021-10-27 RX ADMIN — ASPIRIN AND EXTENDED-RELEASE DIPYRIDAMOLE 1 CAPSULE: 25; 200 CAPSULE ORAL at 17:06

## 2021-10-27 RX ADMIN — INSULIN LISPRO 1 UNITS: 100 INJECTION, SOLUTION INTRAVENOUS; SUBCUTANEOUS at 12:54

## 2021-10-27 RX ADMIN — SODIUM CHLORIDE, SODIUM GLUCONATE, SODIUM ACETATE, POTASSIUM CHLORIDE, MAGNESIUM CHLORIDE, SODIUM PHOSPHATE, DIBASIC, AND POTASSIUM PHOSPHATE 75 ML/HR: .53; .5; .37; .037; .03; .012; .00082 INJECTION, SOLUTION INTRAVENOUS at 07:27

## 2021-10-27 RX ADMIN — DOCUSATE SODIUM 100 MG: 100 CAPSULE ORAL at 17:06

## 2021-10-27 RX ADMIN — INSULIN GLARGINE 20 UNITS: 100 INJECTION, SOLUTION SUBCUTANEOUS at 21:40

## 2021-10-27 RX ADMIN — ATORVASTATIN CALCIUM 80 MG: 80 TABLET, FILM COATED ORAL at 17:06

## 2021-10-27 RX ADMIN — INSULIN LISPRO 5 UNITS: 100 INJECTION, SOLUTION INTRAVENOUS; SUBCUTANEOUS at 21:40

## 2021-10-27 RX ADMIN — INSULIN LISPRO 2 UNITS: 100 INJECTION, SOLUTION INTRAVENOUS; SUBCUTANEOUS at 17:07

## 2021-10-27 RX ADMIN — ASPIRIN AND EXTENDED-RELEASE DIPYRIDAMOLE 1 CAPSULE: 25; 200 CAPSULE ORAL at 05:14

## 2021-10-27 RX ADMIN — INSULIN LISPRO 2 UNITS: 100 INJECTION, SOLUTION INTRAVENOUS; SUBCUTANEOUS at 08:08

## 2021-10-27 RX ADMIN — DOCUSATE SODIUM 100 MG: 100 CAPSULE ORAL at 08:08

## 2021-10-27 RX ADMIN — GABAPENTIN 300 MG: 300 CAPSULE ORAL at 08:08

## 2021-10-28 LAB
GLUCOSE SERPL-MCNC: 140 MG/DL (ref 65–140)
GLUCOSE SERPL-MCNC: 194 MG/DL (ref 65–140)
GLUCOSE SERPL-MCNC: 203 MG/DL (ref 65–140)
GLUCOSE SERPL-MCNC: 281 MG/DL (ref 65–140)

## 2021-10-28 PROCEDURE — 99232 SBSQ HOSP IP/OBS MODERATE 35: CPT | Performed by: NURSE PRACTITIONER

## 2021-10-28 PROCEDURE — 99232 SBSQ HOSP IP/OBS MODERATE 35: CPT | Performed by: INTERNAL MEDICINE

## 2021-10-28 PROCEDURE — 82948 REAGENT STRIP/BLOOD GLUCOSE: CPT

## 2021-10-28 RX ORDER — INSULIN GLARGINE 100 [IU]/ML
15 INJECTION, SOLUTION SUBCUTANEOUS
Status: DISCONTINUED | OUTPATIENT
Start: 2021-10-28 | End: 2021-10-29

## 2021-10-28 RX ADMIN — INSULIN LISPRO 4 UNITS: 100 INJECTION, SOLUTION INTRAVENOUS; SUBCUTANEOUS at 12:19

## 2021-10-28 RX ADMIN — DOCUSATE SODIUM 100 MG: 100 CAPSULE ORAL at 17:17

## 2021-10-28 RX ADMIN — ASPIRIN AND EXTENDED-RELEASE DIPYRIDAMOLE 1 CAPSULE: 25; 200 CAPSULE ORAL at 04:52

## 2021-10-28 RX ADMIN — INSULIN LISPRO 2 UNITS: 100 INJECTION, SOLUTION INTRAVENOUS; SUBCUTANEOUS at 17:17

## 2021-10-28 RX ADMIN — INSULIN GLARGINE 15 UNITS: 100 INJECTION, SOLUTION SUBCUTANEOUS at 21:06

## 2021-10-28 RX ADMIN — ATORVASTATIN CALCIUM 80 MG: 80 TABLET, FILM COATED ORAL at 17:17

## 2021-10-28 RX ADMIN — ASPIRIN AND EXTENDED-RELEASE DIPYRIDAMOLE 1 CAPSULE: 25; 200 CAPSULE ORAL at 17:17

## 2021-10-28 RX ADMIN — INSULIN LISPRO 2 UNITS: 100 INJECTION, SOLUTION INTRAVENOUS; SUBCUTANEOUS at 08:12

## 2021-10-28 RX ADMIN — ACETAMINOPHEN 650 MG: 325 TABLET, FILM COATED ORAL at 18:12

## 2021-10-28 RX ADMIN — DOCUSATE SODIUM 100 MG: 100 CAPSULE ORAL at 08:12

## 2021-10-29 ENCOUNTER — APPOINTMENT (INPATIENT)
Dept: RADIOLOGY | Facility: HOSPITAL | Age: 71
DRG: 254 | End: 2021-10-29
Attending: RADIOLOGY
Payer: COMMERCIAL

## 2021-10-29 PROBLEM — I70.221 ATHEROSCLEROSIS OF NATIVE ARTERIES OF EXTREMITIES WITH REST PAIN, RIGHT LEG (HCC): Status: ACTIVE | Noted: 2021-10-26

## 2021-10-29 LAB
GLUCOSE SERPL-MCNC: 126 MG/DL (ref 65–140)
GLUCOSE SERPL-MCNC: 143 MG/DL (ref 65–140)
GLUCOSE SERPL-MCNC: 259 MG/DL (ref 65–140)
GLUCOSE SERPL-MCNC: 98 MG/DL (ref 65–140)
KCT BLD-ACNC: 207 SEC (ref 89–137)
SPECIMEN SOURCE: ABNORMAL

## 2021-10-29 PROCEDURE — 37226 PR REVASCULARIZE FEM/POP ARTERY,ANGIOPLASTY/STENT: CPT | Performed by: RADIOLOGY

## 2021-10-29 PROCEDURE — C1887 CATHETER, GUIDING: HCPCS

## 2021-10-29 PROCEDURE — 75630 X-RAY AORTA LEG ARTERIES: CPT

## 2021-10-29 PROCEDURE — C1769 GUIDE WIRE: HCPCS

## 2021-10-29 PROCEDURE — 37226 HB FEM/POPL REVASC W/STENT: CPT

## 2021-10-29 PROCEDURE — X27H385 DILATION OF RIGHT FEMORAL ARTERY WITH SUSTAINED RELEASE DRUG-ELUTING INTRALUMINAL DEVICE, PERCUTANEOUS APPROACH, NEW TECHNOLOGY GROUP 5: ICD-10-PCS | Performed by: RADIOLOGY

## 2021-10-29 PROCEDURE — B410YZZ FLUOROSCOPY OF ABDOMINAL AORTA USING OTHER CONTRAST: ICD-10-PCS | Performed by: RADIOLOGY

## 2021-10-29 PROCEDURE — 99152 MOD SED SAME PHYS/QHP 5/>YRS: CPT | Performed by: RADIOLOGY

## 2021-10-29 PROCEDURE — C1874 STENT, COATED/COV W/DEL SYS: HCPCS

## 2021-10-29 PROCEDURE — 99232 SBSQ HOSP IP/OBS MODERATE 35: CPT | Performed by: NURSE PRACTITIONER

## 2021-10-29 PROCEDURE — 76937 US GUIDE VASCULAR ACCESS: CPT

## 2021-10-29 PROCEDURE — NC001 PR NO CHARGE: Performed by: SURGERY

## 2021-10-29 PROCEDURE — 75625 CONTRAST EXAM ABDOMINL AORTA: CPT | Performed by: RADIOLOGY

## 2021-10-29 PROCEDURE — 82948 REAGENT STRIP/BLOOD GLUCOSE: CPT

## 2021-10-29 PROCEDURE — 99153 MOD SED SAME PHYS/QHP EA: CPT

## 2021-10-29 PROCEDURE — C1760 CLOSURE DEV, VASC: HCPCS

## 2021-10-29 PROCEDURE — 99152 MOD SED SAME PHYS/QHP 5/>YRS: CPT

## 2021-10-29 PROCEDURE — C1894 INTRO/SHEATH, NON-LASER: HCPCS

## 2021-10-29 PROCEDURE — 85347 COAGULATION TIME ACTIVATED: CPT

## 2021-10-29 PROCEDURE — C1725 CATH, TRANSLUMIN NON-LASER: HCPCS

## 2021-10-29 PROCEDURE — 99232 SBSQ HOSP IP/OBS MODERATE 35: CPT | Performed by: INTERNAL MEDICINE

## 2021-10-29 PROCEDURE — B41FYZZ FLUOROSCOPY OF RIGHT LOWER EXTREMITY ARTERIES USING OTHER CONTRAST: ICD-10-PCS | Performed by: RADIOLOGY

## 2021-10-29 PROCEDURE — 76937 US GUIDE VASCULAR ACCESS: CPT | Performed by: RADIOLOGY

## 2021-10-29 PROCEDURE — 75710 ARTERY X-RAYS ARM/LEG: CPT | Performed by: RADIOLOGY

## 2021-10-29 RX ORDER — FENTANYL CITRATE 50 UG/ML
INJECTION, SOLUTION INTRAMUSCULAR; INTRAVENOUS CODE/TRAUMA/SEDATION MEDICATION
Status: COMPLETED | OUTPATIENT
Start: 2021-10-29 | End: 2021-10-29

## 2021-10-29 RX ORDER — INSULIN GLARGINE 100 [IU]/ML
25 INJECTION, SOLUTION SUBCUTANEOUS
Status: DISCONTINUED | OUTPATIENT
Start: 2021-10-29 | End: 2021-10-30 | Stop reason: HOSPADM

## 2021-10-29 RX ORDER — MIDAZOLAM HYDROCHLORIDE 2 MG/2ML
INJECTION, SOLUTION INTRAMUSCULAR; INTRAVENOUS CODE/TRAUMA/SEDATION MEDICATION
Status: COMPLETED | OUTPATIENT
Start: 2021-10-29 | End: 2021-10-29

## 2021-10-29 RX ORDER — HEPARIN SODIUM 1000 [USP'U]/ML
INJECTION, SOLUTION INTRAVENOUS; SUBCUTANEOUS CODE/TRAUMA/SEDATION MEDICATION
Status: COMPLETED | OUTPATIENT
Start: 2021-10-29 | End: 2021-10-29

## 2021-10-29 RX ORDER — LIDOCAINE WITH 8.4% SOD BICARB 0.9%(10ML)
SYRINGE (ML) INJECTION CODE/TRAUMA/SEDATION MEDICATION
Status: COMPLETED | OUTPATIENT
Start: 2021-10-29 | End: 2021-10-29

## 2021-10-29 RX ADMIN — HEPARIN SODIUM 5000 UNITS: 1000 INJECTION INTRAVENOUS; SUBCUTANEOUS at 14:34

## 2021-10-29 RX ADMIN — ATORVASTATIN CALCIUM 80 MG: 80 TABLET, FILM COATED ORAL at 16:09

## 2021-10-29 RX ADMIN — ASPIRIN AND EXTENDED-RELEASE DIPYRIDAMOLE 1 CAPSULE: 25; 200 CAPSULE ORAL at 04:40

## 2021-10-29 RX ADMIN — MIDAZOLAM 0.5 MG: 1 INJECTION INTRAMUSCULAR; INTRAVENOUS at 14:46

## 2021-10-29 RX ADMIN — MIDAZOLAM 1 MG: 1 INJECTION INTRAMUSCULAR; INTRAVENOUS at 13:46

## 2021-10-29 RX ADMIN — FENTANYL CITRATE 25 MCG: 50 INJECTION INTRAMUSCULAR; INTRAVENOUS at 14:01

## 2021-10-29 RX ADMIN — ACETAMINOPHEN 650 MG: 325 TABLET, FILM COATED ORAL at 17:01

## 2021-10-29 RX ADMIN — INSULIN GLARGINE 25 UNITS: 100 INJECTION, SOLUTION SUBCUTANEOUS at 21:34

## 2021-10-29 RX ADMIN — ASPIRIN AND EXTENDED-RELEASE DIPYRIDAMOLE 1 CAPSULE: 25; 200 CAPSULE ORAL at 16:09

## 2021-10-29 RX ADMIN — IODIXANOL 128 ML: 320 INJECTION, SOLUTION INTRAVASCULAR at 14:00

## 2021-10-29 RX ADMIN — Medication 10 ML: at 13:59

## 2021-10-29 RX ADMIN — MIDAZOLAM 0.5 MG: 1 INJECTION INTRAMUSCULAR; INTRAVENOUS at 14:59

## 2021-10-29 RX ADMIN — INSULIN LISPRO 3 UNITS: 100 INJECTION, SOLUTION INTRAVENOUS; SUBCUTANEOUS at 21:34

## 2021-10-29 RX ADMIN — ACETAMINOPHEN 650 MG: 325 TABLET, FILM COATED ORAL at 06:36

## 2021-10-29 RX ADMIN — FENTANYL CITRATE 25 MCG: 50 INJECTION INTRAMUSCULAR; INTRAVENOUS at 14:59

## 2021-10-29 RX ADMIN — FENTANYL CITRATE 25 MCG: 50 INJECTION INTRAMUSCULAR; INTRAVENOUS at 14:46

## 2021-10-29 RX ADMIN — MIDAZOLAM 0.5 MG: 1 INJECTION INTRAMUSCULAR; INTRAVENOUS at 14:01

## 2021-10-29 RX ADMIN — GABAPENTIN 300 MG: 300 CAPSULE ORAL at 16:09

## 2021-10-29 RX ADMIN — FENTANYL CITRATE 50 MCG: 50 INJECTION INTRAMUSCULAR; INTRAVENOUS at 13:46

## 2021-10-30 VITALS
SYSTOLIC BLOOD PRESSURE: 106 MMHG | HEART RATE: 65 BPM | TEMPERATURE: 97.4 F | BODY MASS INDEX: 21.8 KG/M2 | OXYGEN SATURATION: 99 % | DIASTOLIC BLOOD PRESSURE: 55 MMHG | RESPIRATION RATE: 17 BRPM | WEIGHT: 160.72 LBS

## 2021-10-30 LAB
ANION GAP SERPL CALCULATED.3IONS-SCNC: 10 MMOL/L (ref 4–13)
BUN SERPL-MCNC: 17 MG/DL (ref 5–25)
CALCIUM SERPL-MCNC: 8.7 MG/DL (ref 8.3–10.1)
CHLORIDE SERPL-SCNC: 104 MMOL/L (ref 100–108)
CO2 SERPL-SCNC: 28 MMOL/L (ref 21–32)
CREAT SERPL-MCNC: 1.07 MG/DL (ref 0.6–1.3)
ERYTHROCYTE [DISTWIDTH] IN BLOOD BY AUTOMATED COUNT: 12.6 % (ref 11.6–15.1)
GFR SERPL CREATININE-BSD FRML MDRD: 70 ML/MIN/1.73SQ M
GLUCOSE SERPL-MCNC: 121 MG/DL (ref 65–140)
GLUCOSE SERPL-MCNC: 139 MG/DL (ref 65–140)
GLUCOSE SERPL-MCNC: 184 MG/DL (ref 65–140)
HCT VFR BLD AUTO: 46.9 % (ref 36.5–49.3)
HGB BLD-MCNC: 16 G/DL (ref 12–17)
MCH RBC QN AUTO: 31.4 PG (ref 26.8–34.3)
MCHC RBC AUTO-ENTMCNC: 34.1 G/DL (ref 31.4–37.4)
MCV RBC AUTO: 92 FL (ref 82–98)
PLATELET # BLD AUTO: 220 THOUSANDS/UL (ref 149–390)
PMV BLD AUTO: 10.3 FL (ref 8.9–12.7)
POTASSIUM SERPL-SCNC: 3.4 MMOL/L (ref 3.5–5.3)
RBC # BLD AUTO: 5.1 MILLION/UL (ref 3.88–5.62)
SODIUM SERPL-SCNC: 142 MMOL/L (ref 136–145)
WBC # BLD AUTO: 11.43 THOUSAND/UL (ref 4.31–10.16)

## 2021-10-30 PROCEDURE — 85027 COMPLETE CBC AUTOMATED: CPT | Performed by: INTERNAL MEDICINE

## 2021-10-30 PROCEDURE — 99239 HOSP IP/OBS DSCHRG MGMT >30: CPT | Performed by: INTERNAL MEDICINE

## 2021-10-30 PROCEDURE — 82948 REAGENT STRIP/BLOOD GLUCOSE: CPT

## 2021-10-30 PROCEDURE — 80048 BASIC METABOLIC PNL TOTAL CA: CPT | Performed by: INTERNAL MEDICINE

## 2021-10-30 RX ORDER — ATORVASTATIN CALCIUM 80 MG/1
80 TABLET, FILM COATED ORAL
Qty: 30 TABLET | Refills: 3 | Status: SHIPPED | OUTPATIENT
Start: 2021-10-30 | End: 2022-02-21 | Stop reason: SDUPTHER

## 2021-10-30 RX ADMIN — INSULIN LISPRO 5 UNITS: 100 INJECTION, SOLUTION INTRAVENOUS; SUBCUTANEOUS at 12:27

## 2021-10-30 RX ADMIN — ASPIRIN AND EXTENDED-RELEASE DIPYRIDAMOLE 1 CAPSULE: 25; 200 CAPSULE ORAL at 05:50

## 2021-10-30 RX ADMIN — ACETAMINOPHEN 650 MG: 325 TABLET, FILM COATED ORAL at 07:48

## 2021-10-30 RX ADMIN — INSULIN LISPRO 5 UNITS: 100 INJECTION, SOLUTION INTRAVENOUS; SUBCUTANEOUS at 08:28

## 2021-10-30 RX ADMIN — INSULIN LISPRO 1 UNITS: 100 INJECTION, SOLUTION INTRAVENOUS; SUBCUTANEOUS at 12:26

## 2021-10-31 PROCEDURE — NC001 PR NO CHARGE: Performed by: SURGERY

## 2021-11-01 ENCOUNTER — TRANSITIONAL CARE MANAGEMENT (OUTPATIENT)
Dept: FAMILY MEDICINE CLINIC | Facility: CLINIC | Age: 71
End: 2021-11-01

## 2021-11-01 ENCOUNTER — TELEPHONE (OUTPATIENT)
Dept: VASCULAR SURGERY | Facility: CLINIC | Age: 71
End: 2021-11-01

## 2021-11-03 DIAGNOSIS — G45.8 OTHER SPECIFIED TRANSIENT CEREBRAL ISCHEMIAS: ICD-10-CM

## 2021-11-03 DIAGNOSIS — E11.9 TYPE 2 DIABETES MELLITUS WITHOUT COMPLICATION, WITHOUT LONG-TERM CURRENT USE OF INSULIN (HCC): ICD-10-CM

## 2021-11-03 RX ORDER — ASPIRIN AND DIPYRIDAMOLE 25; 200 MG/1; MG/1
1 CAPSULE, EXTENDED RELEASE ORAL EVERY 12 HOURS
Qty: 180 CAPSULE | Refills: 1 | Status: SHIPPED | OUTPATIENT
Start: 2021-11-03 | End: 2022-01-26 | Stop reason: SDUPTHER

## 2021-11-04 ENCOUNTER — APPOINTMENT (RX ONLY)
Dept: URBAN - NONMETROPOLITAN AREA CLINIC 4 | Facility: CLINIC | Age: 71
Setting detail: DERMATOLOGY
End: 2021-11-04

## 2021-11-04 DIAGNOSIS — L50.8 OTHER URTICARIA: ICD-10-CM

## 2021-11-04 PROBLEM — L50.1 IDIOPATHIC URTICARIA: Status: ACTIVE | Noted: 2021-11-04

## 2021-11-04 PROCEDURE — ? PRESCRIPTION

## 2021-11-04 PROCEDURE — ? COUNSELING

## 2021-11-04 PROCEDURE — ? BIOPSY BY PUNCH METHOD

## 2021-11-04 PROCEDURE — 11104 PUNCH BX SKIN SINGLE LESION: CPT

## 2021-11-04 PROCEDURE — 11105 PUNCH BX SKIN EA SEP/ADDL: CPT

## 2021-11-04 RX ORDER — PREDNISONE 10 MG/1
TABLET ORAL
Qty: 24 | Refills: 0 | Status: ERX | COMMUNITY
Start: 2021-11-04

## 2021-11-04 RX ADMIN — PREDNISONE: 10 TABLET ORAL at 00:00

## 2021-11-04 ASSESSMENT — LOCATION DETAILED DESCRIPTION DERM
LOCATION DETAILED: LEFT ANTERIOR PROXIMAL THIGH
LOCATION DETAILED: RIGHT ANTERIOR PROXIMAL THIGH
LOCATION DETAILED: SUPERIOR LUMBAR SPINE
LOCATION DETAILED: LEFT INFERIOR LATERAL MIDBACK
LOCATION DETAILED: LEFT INFERIOR MEDIAL MIDBACK

## 2021-11-04 ASSESSMENT — LOCATION ZONE DERM
LOCATION ZONE: TRUNK
LOCATION ZONE: LEG

## 2021-11-04 ASSESSMENT — LOCATION SIMPLE DESCRIPTION DERM
LOCATION SIMPLE: LEFT THIGH
LOCATION SIMPLE: RIGHT THIGH
LOCATION SIMPLE: LEFT LOWER BACK
LOCATION SIMPLE: LOWER BACK

## 2021-11-04 NOTE — HPI: RASH
What Type Of Note Output Would You Prefer (Optional)?: Standard Output
How Severe Is Your Rash?: mild
Is This A New Presentation, Or A Follow-Up?: Rash
Additional History: Patient states that the pcp said it was mites from the dogs but his dogs does not have mites. He got an otc cream that did not help but is unsure of the name of the medication

## 2021-11-04 NOTE — PROCEDURE: BIOPSY BY PUNCH METHOD
Detail Level: Detailed
Was A Bandage Applied: Yes
Punch Size In Mm: 2.5
Biopsy Type: H and E
Anesthesia Type: 1% lidocaine with 1:200,000 epinephrine
Anesthesia Volume In Cc (Will Not Render If 0): 0.5
Additional Anesthesia Volume In Cc (Will Not Render If 0): 0
Hemostasis: None
Epidermal Sutures: none, closed by secondary intention
Wound Care: Petrolatum
Dressing: bandage
Patient Will Remove Sutures At Home?: No
Consent: Written consent was obtained and risks were reviewed including but not limited to scarring, infection, bleeding, scabbing, incomplete removal, nerve damage and allergy to anesthesia.
Post-Care Instructions: I reviewed with the patient in detail post-care instructions. Patient is to keep the biopsy site dry overnight, and then apply bacitracin twice daily until healed. Patient may apply hydrogen peroxide soaks to remove any crusting.
Home Suture Removal Text: Patient was provided a home suture removal kit and will remove their sutures at home.  If they have any questions or difficulties they will call the office.
Notification Instructions: Patient will be notified of biopsy results. However, patient instructed to call the office if not contacted within 2 weeks.
Billing Type: Third-Party Bill
Information: Selecting Yes will display possible errors in your note based on the variables you have selected. This validation is only offered as a suggestion for you. PLEASE NOTE THAT THE VALIDATION TEXT WILL BE REMOVED WHEN YOU FINALIZE YOUR NOTE. IF YOU WANT TO FAX A PRELIMINARY NOTE YOU WILL NEED TO TOGGLE THIS TO 'NO' IF YOU DO NOT WANT IT IN YOUR FAXED NOTE.
Anesthesia Type: 1% lidocaine with epinephrine
Hemostasis: Pro QR topical powder
Suture Removal: 14 days

## 2021-11-17 ENCOUNTER — OFFICE VISIT (OUTPATIENT)
Dept: VASCULAR SURGERY | Facility: HOSPITAL | Age: 71
End: 2021-11-17
Payer: COMMERCIAL

## 2021-11-17 VITALS
DIASTOLIC BLOOD PRESSURE: 80 MMHG | HEART RATE: 84 BPM | RESPIRATION RATE: 18 BRPM | HEIGHT: 72 IN | WEIGHT: 163 LBS | BODY MASS INDEX: 22.08 KG/M2 | SYSTOLIC BLOOD PRESSURE: 120 MMHG

## 2021-11-17 DIAGNOSIS — I70.221 ATHEROSCLEROSIS OF NATIVE ARTERIES OF EXTREMITIES WITH REST PAIN, RIGHT LEG (HCC): Primary | ICD-10-CM

## 2021-11-17 DIAGNOSIS — I73.9 CLAUDICATION OF LEFT LOWER EXTREMITY (HCC): ICD-10-CM

## 2021-11-17 PROCEDURE — 3008F BODY MASS INDEX DOCD: CPT | Performed by: SURGERY

## 2021-11-17 PROCEDURE — 99214 OFFICE O/P EST MOD 30 MIN: CPT | Performed by: SURGERY

## 2021-11-17 PROCEDURE — 1160F RVW MEDS BY RX/DR IN RCRD: CPT | Performed by: SURGERY

## 2021-11-17 RX ORDER — PREDNISONE 10 MG/1
TABLET ORAL
COMMUNITY
Start: 2021-11-04 | End: 2022-01-26 | Stop reason: ALTCHOICE

## 2021-11-22 ENCOUNTER — APPOINTMENT (RX ONLY)
Dept: URBAN - NONMETROPOLITAN AREA CLINIC 4 | Facility: CLINIC | Age: 71
Setting detail: DERMATOLOGY
End: 2021-11-22

## 2021-11-22 DIAGNOSIS — L27.0 GENERALIZED SKIN ERUPTION DUE TO DRUGS AND MEDICAMENTS TAKEN INTERNALLY: ICD-10-CM | Status: WORSENING

## 2021-11-22 PROCEDURE — 99214 OFFICE O/P EST MOD 30 MIN: CPT

## 2021-11-22 PROCEDURE — ? TREATMENT REGIMEN

## 2021-11-22 PROCEDURE — ? COUNSELING

## 2021-11-22 PROCEDURE — ? PRESCRIPTION

## 2021-11-22 RX ORDER — TRIAMCINOLONE ACETONIDE 1 MG/G
CREAM TOPICAL BID
Qty: 454 | Refills: 3 | Status: ERX | COMMUNITY
Start: 2021-11-22

## 2021-11-22 RX ORDER — PREDNISONE 10 MG/1
TABLET ORAL
Qty: 24 | Refills: 0 | Status: ERX

## 2021-11-22 RX ADMIN — TRIAMCINOLONE ACETONIDE: 1 CREAM TOPICAL at 00:00

## 2021-11-22 ASSESSMENT — LOCATION DETAILED DESCRIPTION DERM
LOCATION DETAILED: RIGHT BUTTOCK
LOCATION DETAILED: LEFT ANTERIOR PROXIMAL THIGH
LOCATION DETAILED: RIGHT MEDIAL INFERIOR CHEST
LOCATION DETAILED: RIGHT ANTERIOR PROXIMAL THIGH
LOCATION DETAILED: LEFT BUTTOCK

## 2021-11-22 ASSESSMENT — LOCATION SIMPLE DESCRIPTION DERM
LOCATION SIMPLE: LEFT THIGH
LOCATION SIMPLE: RIGHT BUTTOCK
LOCATION SIMPLE: CHEST
LOCATION SIMPLE: LEFT BUTTOCK
LOCATION SIMPLE: RIGHT THIGH

## 2021-11-22 ASSESSMENT — LOCATION ZONE DERM
LOCATION ZONE: LEG
LOCATION ZONE: TRUNK

## 2021-11-22 ASSESSMENT — SEVERITY ASSESSMENT: SEVERITY: MODERATE TO SEVERE

## 2021-12-01 ENCOUNTER — VBI (OUTPATIENT)
Dept: ADMINISTRATIVE | Facility: OTHER | Age: 71
End: 2021-12-01

## 2021-12-06 ENCOUNTER — OFFICE VISIT (OUTPATIENT)
Dept: FAMILY MEDICINE CLINIC | Facility: CLINIC | Age: 71
End: 2021-12-06
Payer: COMMERCIAL

## 2021-12-06 VITALS
HEIGHT: 71 IN | OXYGEN SATURATION: 99 % | DIASTOLIC BLOOD PRESSURE: 64 MMHG | TEMPERATURE: 96.7 F | HEART RATE: 70 BPM | BODY MASS INDEX: 22.26 KG/M2 | RESPIRATION RATE: 16 BRPM | WEIGHT: 159 LBS | SYSTOLIC BLOOD PRESSURE: 134 MMHG

## 2021-12-06 DIAGNOSIS — Z00.00 MEDICARE ANNUAL WELLNESS VISIT, SUBSEQUENT: Primary | ICD-10-CM

## 2021-12-06 DIAGNOSIS — E11.42 DIABETIC POLYNEUROPATHY ASSOCIATED WITH TYPE 2 DIABETES MELLITUS (HCC): ICD-10-CM

## 2021-12-06 DIAGNOSIS — I73.9 CLAUDICATION OF RIGHT LOWER EXTREMITY (HCC): ICD-10-CM

## 2021-12-06 DIAGNOSIS — E53.8 LOW VITAMIN B12 LEVEL: ICD-10-CM

## 2021-12-06 DIAGNOSIS — I70.221 ATHEROSCLEROSIS OF NATIVE ARTERIES OF EXTREMITIES WITH REST PAIN, RIGHT LEG (HCC): ICD-10-CM

## 2021-12-06 DIAGNOSIS — Z11.59 NEED FOR HEPATITIS C SCREENING TEST: ICD-10-CM

## 2021-12-06 PROCEDURE — G0439 PPPS, SUBSEQ VISIT: HCPCS | Performed by: FAMILY MEDICINE

## 2021-12-06 PROCEDURE — 3725F SCREEN DEPRESSION PERFORMED: CPT | Performed by: FAMILY MEDICINE

## 2021-12-06 PROCEDURE — 1170F FXNL STATUS ASSESSED: CPT | Performed by: FAMILY MEDICINE

## 2021-12-06 PROCEDURE — 1160F RVW MEDS BY RX/DR IN RCRD: CPT | Performed by: FAMILY MEDICINE

## 2021-12-06 PROCEDURE — 99204 OFFICE O/P NEW MOD 45 MIN: CPT | Performed by: FAMILY MEDICINE

## 2021-12-06 PROCEDURE — 3288F FALL RISK ASSESSMENT DOCD: CPT | Performed by: FAMILY MEDICINE

## 2021-12-06 PROCEDURE — 1100F PTFALLS ASSESS-DOCD GE2>/YR: CPT | Performed by: FAMILY MEDICINE

## 2021-12-06 PROCEDURE — 1125F AMNT PAIN NOTED PAIN PRSNT: CPT | Performed by: FAMILY MEDICINE

## 2021-12-06 PROCEDURE — 1036F TOBACCO NON-USER: CPT | Performed by: FAMILY MEDICINE

## 2021-12-06 PROCEDURE — 3008F BODY MASS INDEX DOCD: CPT | Performed by: FAMILY MEDICINE

## 2021-12-06 RX ORDER — TRIAMCINOLONE ACETONIDE 1 MG/G
CREAM TOPICAL
COMMUNITY
Start: 2021-11-23 | End: 2022-01-26 | Stop reason: ALTCHOICE

## 2021-12-06 RX ORDER — DAPAGLIFLOZIN 5 MG/1
5 TABLET, FILM COATED ORAL DAILY
Qty: 30 TABLET | Refills: 3 | Status: SHIPPED | OUTPATIENT
Start: 2021-12-06 | End: 2022-01-26 | Stop reason: DRUGHIGH

## 2021-12-08 ENCOUNTER — TELEPHONE (OUTPATIENT)
Dept: ADMINISTRATIVE | Facility: OTHER | Age: 71
End: 2021-12-08

## 2021-12-09 ENCOUNTER — TELEPHONE (OUTPATIENT)
Dept: FAMILY MEDICINE CLINIC | Facility: CLINIC | Age: 71
End: 2021-12-09

## 2021-12-09 PROBLEM — Z87.891 FORMER SMOKER: Status: ACTIVE | Noted: 2021-12-09

## 2021-12-09 PROBLEM — Z00.00 MEDICARE ANNUAL WELLNESS VISIT, SUBSEQUENT: Status: ACTIVE | Noted: 2021-12-09

## 2021-12-14 ENCOUNTER — IMMUNIZATIONS (OUTPATIENT)
Dept: FAMILY MEDICINE CLINIC | Facility: HOSPITAL | Age: 71
End: 2021-12-14

## 2021-12-14 DIAGNOSIS — Z23 ENCOUNTER FOR IMMUNIZATION: Primary | ICD-10-CM

## 2021-12-14 PROCEDURE — 91306 COVID-19 MODERNA VACC 0.25 ML BOOSTER: CPT

## 2021-12-14 PROCEDURE — 0064A COVID-19 MODERNA VACC 0.25 ML BOOSTER: CPT

## 2021-12-15 ENCOUNTER — VBI (OUTPATIENT)
Dept: ADMINISTRATIVE | Facility: OTHER | Age: 71
End: 2021-12-15

## 2021-12-16 ENCOUNTER — APPOINTMENT (RX ONLY)
Dept: URBAN - NONMETROPOLITAN AREA CLINIC 4 | Facility: CLINIC | Age: 71
Setting detail: DERMATOLOGY
End: 2021-12-16

## 2021-12-16 DIAGNOSIS — L27.0 GENERALIZED SKIN ERUPTION DUE TO DRUGS AND MEDICAMENTS TAKEN INTERNALLY: ICD-10-CM | Status: WORSENING

## 2021-12-16 PROCEDURE — ? PRESCRIPTION

## 2021-12-16 PROCEDURE — ? PRESCRIPTION MEDICATION MANAGEMENT

## 2021-12-16 PROCEDURE — ? COUNSELING

## 2021-12-16 PROCEDURE — 99213 OFFICE O/P EST LOW 20 MIN: CPT

## 2021-12-16 RX ORDER — SALICYLIC ACID 3 G/100G
LOTION TOPICAL BID
Qty: 60 | Refills: 3 | Status: ERX | COMMUNITY
Start: 2021-12-16

## 2021-12-16 RX ORDER — BETAMETHASONE DIPROPIONATE 0.5 MG/G
CREAM TOPICAL
Qty: 45 | Refills: 3 | Status: ERX | COMMUNITY
Start: 2021-12-16

## 2021-12-16 RX ADMIN — BETAMETHASONE DIPROPIONATE: 0.5 CREAM TOPICAL at 00:00

## 2021-12-16 RX ADMIN — SALICYLIC ACID: 3 LOTION TOPICAL at 00:00

## 2021-12-16 ASSESSMENT — LOCATION SIMPLE DESCRIPTION DERM
LOCATION SIMPLE: ABDOMEN
LOCATION SIMPLE: LEFT FOREARM
LOCATION SIMPLE: UPPER BACK
LOCATION SIMPLE: CHEST
LOCATION SIMPLE: LEFT THIGH
LOCATION SIMPLE: RIGHT THIGH
LOCATION SIMPLE: RIGHT FOREARM

## 2021-12-16 ASSESSMENT — LOCATION DETAILED DESCRIPTION DERM
LOCATION DETAILED: LEFT VENTRAL DISTAL FOREARM
LOCATION DETAILED: PERIUMBILICAL SKIN
LOCATION DETAILED: STERNAL NOTCH
LOCATION DETAILED: LEFT ANTERIOR DISTAL THIGH
LOCATION DETAILED: RIGHT ANTERIOR DISTAL THIGH
LOCATION DETAILED: RIGHT VENTRAL PROXIMAL FOREARM
LOCATION DETAILED: SUPERIOR THORACIC SPINE

## 2021-12-16 ASSESSMENT — LOCATION ZONE DERM
LOCATION ZONE: ARM
LOCATION ZONE: LEG
LOCATION ZONE: TRUNK

## 2021-12-27 ENCOUNTER — VBI (OUTPATIENT)
Dept: ADMINISTRATIVE | Facility: OTHER | Age: 71
End: 2021-12-27

## 2022-01-02 ENCOUNTER — HOSPITAL ENCOUNTER (EMERGENCY)
Facility: HOSPITAL | Age: 72
Discharge: HOME | End: 2022-01-02
Attending: EMERGENCY MEDICINE | Admitting: EMERGENCY MEDICINE
Payer: COMMERCIAL

## 2022-01-02 VITALS
HEART RATE: 95 BPM | OXYGEN SATURATION: 98 % | BODY MASS INDEX: 22.25 KG/M2 | RESPIRATION RATE: 20 BRPM | DIASTOLIC BLOOD PRESSURE: 62 MMHG | HEIGHT: 71 IN | SYSTOLIC BLOOD PRESSURE: 158 MMHG | WEIGHT: 158.95 LBS | TEMPERATURE: 98.2 F

## 2022-01-02 DIAGNOSIS — L02.91 ABSCESS: Primary | ICD-10-CM

## 2022-01-02 PROCEDURE — 99284 EMERGENCY DEPT VISIT MOD MDM: CPT | Performed by: EMERGENCY MEDICINE

## 2022-01-02 PROCEDURE — 10061 I&D ABSCESS COMP/MULTIPLE: CPT | Performed by: EMERGENCY MEDICINE

## 2022-01-02 PROCEDURE — 99282 EMERGENCY DEPT VISIT SF MDM: CPT

## 2022-01-02 RX ORDER — CEPHALEXIN 500 MG/1
500 CAPSULE ORAL EVERY 8 HOURS SCHEDULED
Qty: 21 CAPSULE | Refills: 0 | Status: SHIPPED | OUTPATIENT
Start: 2022-01-02 | End: 2022-01-09

## 2022-01-02 RX ORDER — SULFAMETHOXAZOLE AND TRIMETHOPRIM 800; 160 MG/1; MG/1
1 TABLET ORAL 2 TIMES DAILY
Qty: 14 TABLET | Refills: 0 | Status: SHIPPED | OUTPATIENT
Start: 2022-01-02 | End: 2022-01-09

## 2022-01-02 RX ORDER — CEPHALEXIN 250 MG/1
500 CAPSULE ORAL ONCE
Status: COMPLETED | OUTPATIENT
Start: 2022-01-02 | End: 2022-01-02

## 2022-01-02 RX ORDER — LIDOCAINE HYDROCHLORIDE AND EPINEPHRINE 10; 10 MG/ML; UG/ML
10 INJECTION, SOLUTION INFILTRATION; PERINEURAL ONCE
Status: COMPLETED | OUTPATIENT
Start: 2022-01-02 | End: 2022-01-02

## 2022-01-02 RX ORDER — SULFAMETHOXAZOLE AND TRIMETHOPRIM 800; 160 MG/1; MG/1
1 TABLET ORAL ONCE
Status: COMPLETED | OUTPATIENT
Start: 2022-01-02 | End: 2022-01-02

## 2022-01-02 RX ADMIN — LIDOCAINE HYDROCHLORIDE,EPINEPHRINE BITARTRATE 10 ML: 10; .01 INJECTION, SOLUTION INFILTRATION; PERINEURAL at 08:06

## 2022-01-02 RX ADMIN — SULFAMETHOXAZOLE AND TRIMETHOPRIM 1 TABLET: 800; 160 TABLET ORAL at 08:06

## 2022-01-02 RX ADMIN — CEPHALEXIN 500 MG: 250 CAPSULE ORAL at 08:06

## 2022-01-02 NOTE — ED PROVIDER NOTES
History  Chief Complaint   Patient presents with    Abscess     abscess behind left ear x4 days; painful and warm      79-year-old male presents for evaluation of left posterior neck abscess  Patient reports he has had the lump for a few days, is increased in size as well as pain  He has tried warm compresses at home without relief  He identifies no drainage from the area  He has had no fevers or chills, nausea or vomiting  Prior to Admission Medications   Prescriptions Last Dose Informant Patient Reported? Taking?    ACCU-CHEK SOFTCLIX LANCETS lancets  Self Yes No   Sig: by Does not apply route 2 (two) times a day   Acetaminophen (TYLENOL) 325 MG CAPS  Self Yes No   Sig: Take 2 capsules by mouth 2 (two) times a day   Dapagliflozin Propanediol (Farxiga) 5 MG TABS   No No   Sig: Take 1 tablet (5 mg total) by mouth daily   aspirin-dipyridamole (AGGRENOX)  mg per 12 hr capsule  Self No No   Sig: Take 1 capsule by mouth every 12 (twelve) hours   atorvastatin (LIPITOR) 80 mg tablet  Self No No   Sig: Take 1 tablet (80 mg total) by mouth daily with dinner   gabapentin (NEURONTIN) 300 mg capsule  Self No No   Sig: TAKE 1 CAPSULE BY MOUTH THREE TIMES A DAY   glucose blood (ACCU-CHEK CAMILLA PLUS) test strip  Self Yes No   Sig: by In Vitro route 2 (two) times a day   metFORMIN (GLUCOPHAGE) 1000 MG tablet  Self No No   Sig: TAKE 1 TABLET BY MOUTH TWICE A DAY WITH MEALS   predniSONE 10 mg tablet  Self Yes No   triamcinolone (KENALOG) 0 1 % cream  Self Yes No      Facility-Administered Medications: None       Past Medical History:   Diagnosis Date    Abnormal CT of the abdomen     last assessed 9/30/16    Anemia     Diabetes mellitus (HCC)     Iron deficiency anemia     last assessed 4/26/16    TIA (transient ischemic attack)        Past Surgical History:   Procedure Laterality Date    EPIDURAL BLOCK INJECTION Bilateral 10/19/2017    Procedure: L5 TRANSFORAMINAL EPIDURAL STEROID INJECTION;  Surgeon: Ricardo Jon DO;  Location: [facility];  Service: Pain Management     IR AORTAGRAM WITH RUN-OFF  10/29/2021       History reviewed  No pertinent family history  I have reviewed and agree with the history as documented  E-Cigarette/Vaping    E-Cigarette Use Never User      E-Cigarette/Vaping Substances    Nicotine No     THC No     CBD No     Flavoring No     Other No     Unknown No      Social History     Tobacco Use    Smoking status: Former Smoker     Types: Cigars     Quit date: 2021     Years since quittin 2    Smokeless tobacco: Never Used    Tobacco comment: 1 a day   Vaping Use    Vaping Use: Never used   Substance Use Topics    Alcohol use: Yes     Comment: social    Drug use: No       Review of Systems   Constitutional: Negative for chills and fever  Gastrointestinal: Negative for nausea and vomiting  Skin: Positive for wound  All other systems reviewed and are negative  Physical Exam  Physical Exam  Vitals reviewed  Constitutional:       General: He is not in acute distress  Appearance: Normal appearance  He is not ill-appearing, toxic-appearing or diaphoretic  HENT:      Head: Normocephalic and atraumatic  Right Ear: External ear normal       Left Ear: External ear normal    Eyes:      General:         Right eye: No discharge  Left eye: No discharge  Cardiovascular:      Rate and Rhythm: Normal rate  Pulmonary:      Effort: Pulmonary effort is normal  No respiratory distress  Musculoskeletal:         General: No deformity or signs of injury  Skin:     General: Skin is warm  Coloration: Skin is not jaundiced  Findings: Lesion present  Comments: About 3cm in fluctuant diameter lesion, scabbing over top but no drainage   Neurological:      General: No focal deficit present  Mental Status: He is alert  Mental status is at baseline        Gait: Gait normal          Vital Signs  ED Triage Vitals [22 0654]   Temperature Pulse Respirations Blood Pressure SpO2   98 2 °F (36 8 °C) 95 20 158/62 98 %      Temp Source Heart Rate Source Patient Position - Orthostatic VS BP Location FiO2 (%)   Temporal Monitor Sitting Right arm --      Pain Score       7           Vitals:    01/02/22 0654   BP: 158/62   Pulse: 95   Patient Position - Orthostatic VS: Sitting         Visual Acuity      ED Medications  Medications   cephalexin (KEFLEX) capsule 500 mg (500 mg Oral Given 1/2/22 0806)   sulfamethoxazole-trimethoprim (BACTRIM DS) 800-160 mg per tablet 1 tablet (1 tablet Oral Given 1/2/22 0806)   lidocaine-epinephrine (XYLOCAINE/EPINEPHRINE) 1 %-1:100,000 injection 10 mL (10 mL Infiltration Given 1/2/22 0806)       Diagnostic Studies  Results Reviewed     None                 No orders to display              Procedures  Incision and drain    Date/Time: 1/2/2022 8:37 AM  Performed by: Geo Hernandez DO  Authorized by: Geo Hernandez DO   Universal Protocol:  Consent: Verbal consent obtained  Risks and benefits: risks, benefits and alternatives were discussed  Consent given by: patient  Required items: required blood products, implants, devices, and special equipment available  Patient identity confirmed: verbally with patient      Patient location:  ED  Location:     Type:  Abscess    Size:  3    Location:  Head/neck    Head/neck location:  Neck  Pre-procedure details:     Skin preparation:  Alcohol  Anesthesia (see MAR for exact dosages): Anesthesia method:  Local infiltration    Local anesthetic:  Lidocaine 1% WITH epi  Procedure details:     Complexity:  Simple    Needle aspiration: no      Incision types:  Stab incision    Aspiration type: puncture aspiration      Scalpel blade:  11    Approach:  Open    Incision depth:  Subcutaneous    Wound management:  Probed and deloculated and irrigated with saline    Drainage:  Purulent    Drainage amount:   Moderate    Wound treatment:  Wound left open    Packing materials: None  Post-procedure details:     Patient tolerance of procedure: Tolerated well, no immediate complications             ED Course  ED Course as of 01/02/22 1259   Sun Jan 02, 2022   2627 POCUS shows small abscess                               SBIRT 20yo+      Most Recent Value   SBIRT (25 yo +)    In order to provide better care to our patients, we are screening all of our patients for alcohol and drug use  Would it be okay to ask you these screening questions? Yes Filed at: 01/02/2022 2568   Initial Alcohol Screen: US AUDIT-C     1  How often do you have a drink containing alcohol? 0 Filed at: 01/02/2022 0659   2  How many drinks containing alcohol do you have on a typical day you are drinking? 0 Filed at: 01/02/2022 0659   3a  Male UNDER 65: How often do you have five or more drinks on one occasion? 0 Filed at: 01/02/2022 0659   Audit-C Score 0 Filed at: 01/02/2022 4686   ARACELI: How many times in the past year have you    Used an illegal drug or used a prescription medication for non-medical reasons? Never Filed at: 01/02/2022 6804                    MDM  Number of Diagnoses or Management Options  Abscess  Diagnosis management comments: 77-year-old male presents for evaluation of abscess  On examination patient has a fluctuant mass, ultrasound confirms abscess pocket  Will perform I and D placed on antibiotics  Disposition  Final diagnoses:   Abscess     Time reflects when diagnosis was documented in both MDM as applicable and the Disposition within this note     Time User Action Codes Description Comment    1/2/2022  7:53 AM Nita Saint Add [L02 91] Abscess       ED Disposition     ED Disposition Condition Date/Time Comment    Discharge Stable Sun Jan 2, 2022  7:53 AM Anthony Hartley discharge to home/self care  Follow-up Information     Follow up With Specialties Details Why [address] [facility]   [address]    [address] [address]  [phone]            Discharge Medication List as of 1/2/2022  8:38 AM      START taking these medications    Details   cephalexin (KEFLEX) 500 mg capsule Take 1 capsule (500 mg total) by mouth every 8 (eight) hours for 7 days, Starting Sun 1/2/2022, Until Sun 1/9/2022, Normal      sulfamethoxazole-trimethoprim (BACTRIM DS) 800-160 mg per tablet Take 1 tablet by mouth 2 (two) times a day for 7 days smx-tmp DS (BACTRIM) 800-160 mg tabs (1tab q12 D10), Starting Sun 1/2/2022, Until Sun 1/9/2022, Normal         CONTINUE these medications which have NOT CHANGED    Details   ACCU-CHEK SOFTCLIX LANCETS lancets by Does not apply route 2 (two) times a day, Starting Mon 5/18/2015, Historical Med      Acetaminophen (TYLENOL) 325 MG CAPS Take 2 capsules by mouth 2 (two) times a day, Historical Med      aspirin-dipyridamole (AGGRENOX)  mg per 12 hr capsule Take 1 capsule by mouth every 12 (twelve) hours, Starting Wed 11/3/2021, Normal      atorvastatin (LIPITOR) 80 mg tablet Take 1 tablet (80 mg total) by mouth daily with dinner, Starting Sat 10/30/2021, Normal      Dapagliflozin Propanediol (Farxiga) 5 MG TABS Take 1 tablet (5 mg total) by mouth daily, Starting Mon 12/6/2021, Normal      gabapentin (NEURONTIN) 300 mg capsule TAKE 1 CAPSULE BY MOUTH THREE TIMES A DAY, Normal      glucose blood (ACCU-CHEK CAMILLA PLUS) test strip by In Vitro route 2 (two) times a day, Starting Mon 5/18/2015, Historical Med      metFORMIN (GLUCOPHAGE) 1000 MG tablet TAKE 1 TABLET BY MOUTH TWICE A DAY WITH MEALS, Normal      predniSONE 10 mg tablet Starting Thu 11/4/2021, Historical Med      triamcinolone (KENALOG) 0 1 % cream Starting Tue 11/23/2021, Historical Med             No discharge procedures on file      PDMP Review     None          ED Provider  Electronically Signed by           Luis Antonio Grullon DO  01/02/22 9982

## 2022-01-03 ENCOUNTER — RA CDI HCC (OUTPATIENT)
Dept: OTHER | Facility: HOSPITAL | Age: 72
End: 2022-01-03

## 2022-01-03 NOTE — PROGRESS NOTES
[address]  coding opportunities          Number of diagnosis code(s) already on the problem list added to FYI fla     Chart Reviewed * (Number of) Inbasket suggestions sent to Provider: 1            Number of suggestions used: 6      Number of suggestions NOT actually used: 1     Patients insurance company: [address]  (Medicare [employer]vant[employer] and [employer])     Visit status: Patient arrived for their scheduled appointment        [facility] [address]  coding opportunities          Number of diagnosis code(s) already on the problem list added to FYI fla     Chart Reviewed * (Number of) Inbasket suggestions sent to Provider: 1   E11 42 T2DM with diabetic polyneuropathy ([address] )  I70 221 Atherosclerosis of native arteries of extremities with rest pain ([address] )  I73 9 Claudication of right lower extremity ([address] )  E11 22, N18 32 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

## 2022-01-04 ENCOUNTER — TELEPHONE (OUTPATIENT)
Dept: FAMILY MEDICINE CLINIC | Facility: CLINIC | Age: 72
End: 2022-01-04

## 2022-01-04 NOTE — TELEPHONE ENCOUNTER
Spoke with patient to follow up with ER states he is still draining and having slight pain he is on antibiotics states he is not leaving the house he cancelled his 1/7/22 appointment because he did not get his labs done yet and rescheduled to 1/26

## 2022-01-10 ENCOUNTER — OFFICE VISIT (OUTPATIENT)
Dept: FAMILY MEDICINE CLINIC | Facility: CLINIC | Age: 72
End: 2022-01-10
Payer: COMMERCIAL

## 2022-01-10 DIAGNOSIS — E11.9 TYPE 2 DIABETES MELLITUS WITHOUT COMPLICATION, WITHOUT LONG-TERM CURRENT USE OF INSULIN (HCC): Primary | ICD-10-CM

## 2022-01-10 PROCEDURE — G0108 DIAB MANAGE TRN  PER INDIV: HCPCS | Performed by: DIETITIAN, REGISTERED

## 2022-01-19 LAB
LEFT EYE DIABETIC RETINOPATHY: NORMAL
RIGHT EYE DIABETIC RETINOPATHY: NORMAL

## 2022-01-24 ENCOUNTER — APPOINTMENT (OUTPATIENT)
Dept: LAB | Facility: MEDICAL CENTER | Age: 72
End: 2022-01-24
Payer: COMMERCIAL

## 2022-01-24 DIAGNOSIS — E11.42 DIABETIC POLYNEUROPATHY ASSOCIATED WITH TYPE 2 DIABETES MELLITUS (HCC): ICD-10-CM

## 2022-01-24 DIAGNOSIS — E53.8 LOW VITAMIN B12 LEVEL: ICD-10-CM

## 2022-01-24 DIAGNOSIS — Z11.59 NEED FOR HEPATITIS C SCREENING TEST: ICD-10-CM

## 2022-01-24 LAB
25(OH)D3 SERPL-MCNC: 10.7 NG/ML (ref 30–100)
ALBUMIN SERPL BCP-MCNC: 3.9 G/DL (ref 3.5–5)
ALP SERPL-CCNC: 77 U/L (ref 46–116)
ALT SERPL W P-5'-P-CCNC: 97 U/L (ref 12–78)
ANION GAP SERPL CALCULATED.3IONS-SCNC: 5 MMOL/L (ref 4–13)
AST SERPL W P-5'-P-CCNC: 43 U/L (ref 5–45)
BASOPHILS # BLD AUTO: 0.02 THOUSANDS/ΜL (ref 0–0.1)
BASOPHILS NFR BLD AUTO: 0 % (ref 0–1)
BILIRUB SERPL-MCNC: 0.85 MG/DL (ref 0.2–1)
BUN SERPL-MCNC: 17 MG/DL (ref 5–25)
CALCIUM SERPL-MCNC: 9.2 MG/DL (ref 8.3–10.1)
CHLORIDE SERPL-SCNC: 103 MMOL/L (ref 100–108)
CHOLEST SERPL-MCNC: 110 MG/DL
CO2 SERPL-SCNC: 29 MMOL/L (ref 21–32)
CREAT SERPL-MCNC: 0.96 MG/DL (ref 0.6–1.3)
CREAT UR-MCNC: 83.9 MG/DL
EOSINOPHIL # BLD AUTO: 0.1 THOUSAND/ΜL (ref 0–0.61)
EOSINOPHIL NFR BLD AUTO: 1 % (ref 0–6)
ERYTHROCYTE [DISTWIDTH] IN BLOOD BY AUTOMATED COUNT: 13.8 % (ref 11.6–15.1)
EST. AVERAGE GLUCOSE BLD GHB EST-MCNC: 280 MG/DL
GFR SERPL CREATININE-BSD FRML MDRD: 79 ML/MIN/1.73SQ M
GLUCOSE P FAST SERPL-MCNC: 258 MG/DL (ref 65–99)
HBA1C MFR BLD: 11.4 %
HCT VFR BLD AUTO: 46.2 % (ref 36.5–49.3)
HCV AB SER QL: NORMAL
HDLC SERPL-MCNC: 62 MG/DL
HGB BLD-MCNC: 15 G/DL (ref 12–17)
IMM GRANULOCYTES # BLD AUTO: 0.01 THOUSAND/UL (ref 0–0.2)
IMM GRANULOCYTES NFR BLD AUTO: 0 % (ref 0–2)
LDLC SERPL CALC-MCNC: 29 MG/DL (ref 0–100)
LYMPHOCYTES # BLD AUTO: 1.69 THOUSANDS/ΜL (ref 0.6–4.47)
LYMPHOCYTES NFR BLD AUTO: 22 % (ref 14–44)
MCH RBC QN AUTO: 31 PG (ref 26.8–34.3)
MCHC RBC AUTO-ENTMCNC: 32.5 G/DL (ref 31.4–37.4)
MCV RBC AUTO: 96 FL (ref 82–98)
MICROALBUMIN UR-MCNC: 8.4 MG/L (ref 0–20)
MICROALBUMIN/CREAT 24H UR: 10 MG/G CREATININE (ref 0–30)
MONOCYTES # BLD AUTO: 0.53 THOUSAND/ΜL (ref 0.17–1.22)
MONOCYTES NFR BLD AUTO: 7 % (ref 4–12)
NEUTROPHILS # BLD AUTO: 5.18 THOUSANDS/ΜL (ref 1.85–7.62)
NEUTS SEG NFR BLD AUTO: 70 % (ref 43–75)
NONHDLC SERPL-MCNC: 48 MG/DL
NRBC BLD AUTO-RTO: 0 /100 WBCS
PLATELET # BLD AUTO: 228 THOUSANDS/UL (ref 149–390)
PMV BLD AUTO: 11.1 FL (ref 8.9–12.7)
POTASSIUM SERPL-SCNC: 4.8 MMOL/L (ref 3.5–5.3)
PROT SERPL-MCNC: 7.3 G/DL (ref 6.4–8.2)
RBC # BLD AUTO: 4.84 MILLION/UL (ref 3.88–5.62)
SODIUM SERPL-SCNC: 137 MMOL/L (ref 136–145)
TRIGL SERPL-MCNC: 93 MG/DL
TSH SERPL DL<=0.05 MIU/L-ACNC: 2.25 UIU/ML (ref 0.36–3.74)
VIT B12 SERPL-MCNC: 633 PG/ML (ref 100–900)
WBC # BLD AUTO: 7.53 THOUSAND/UL (ref 4.31–10.16)

## 2022-01-24 PROCEDURE — 80053 COMPREHEN METABOLIC PANEL: CPT

## 2022-01-24 PROCEDURE — 82607 VITAMIN B-12: CPT

## 2022-01-24 PROCEDURE — 82043 UR ALBUMIN QUANTITATIVE: CPT | Performed by: FAMILY MEDICINE

## 2022-01-24 PROCEDURE — 86803 HEPATITIS C AB TEST: CPT

## 2022-01-24 PROCEDURE — 82570 ASSAY OF URINE CREATININE: CPT | Performed by: FAMILY MEDICINE

## 2022-01-24 PROCEDURE — 82306 VITAMIN D 25 HYDROXY: CPT

## 2022-01-24 PROCEDURE — 80061 LIPID PANEL: CPT

## 2022-01-24 PROCEDURE — 36415 COLL VENOUS BLD VENIPUNCTURE: CPT

## 2022-01-24 PROCEDURE — 3046F HEMOGLOBIN A1C LEVEL >9.0%: CPT | Performed by: FAMILY MEDICINE

## 2022-01-24 PROCEDURE — 83036 HEMOGLOBIN GLYCOSYLATED A1C: CPT

## 2022-01-24 PROCEDURE — 3061F NEG MICROALBUMINURIA REV: CPT | Performed by: FAMILY MEDICINE

## 2022-01-24 PROCEDURE — 85025 COMPLETE CBC W/AUTO DIFF WBC: CPT

## 2022-01-24 PROCEDURE — 84443 ASSAY THYROID STIM HORMONE: CPT

## 2022-01-26 ENCOUNTER — OFFICE VISIT (OUTPATIENT)
Dept: FAMILY MEDICINE CLINIC | Facility: CLINIC | Age: 72
End: 2022-01-26
Payer: COMMERCIAL

## 2022-01-26 VITALS
TEMPERATURE: 98.3 F | SYSTOLIC BLOOD PRESSURE: 120 MMHG | BODY MASS INDEX: 23.77 KG/M2 | WEIGHT: 166 LBS | HEART RATE: 60 BPM | OXYGEN SATURATION: 98 % | DIASTOLIC BLOOD PRESSURE: 70 MMHG | HEIGHT: 70 IN

## 2022-01-26 DIAGNOSIS — G61.81 CIDP (CHRONIC INFLAMMATORY DEMYELINATING POLYNEUROPATHY) (HCC): ICD-10-CM

## 2022-01-26 DIAGNOSIS — G63 B12 NEUROPATHY (HCC): ICD-10-CM

## 2022-01-26 DIAGNOSIS — E11.22 TYPE 2 DIABETES MELLITUS WITH STAGE 3A CHRONIC KIDNEY DISEASE, WITHOUT LONG-TERM CURRENT USE OF INSULIN (HCC): Primary | ICD-10-CM

## 2022-01-26 DIAGNOSIS — E53.8 B12 NEUROPATHY (HCC): ICD-10-CM

## 2022-01-26 DIAGNOSIS — I70.235 ATHEROSCLEROSIS OF NATIVE ARTERIES OF RIGHT LEG WITH ULCERATION OF OTHER PART OF FOOT (HCC): ICD-10-CM

## 2022-01-26 DIAGNOSIS — R91.1 PULMONARY NODULE SEEN ON IMAGING STUDY: ICD-10-CM

## 2022-01-26 DIAGNOSIS — G45.8 OTHER SPECIFIED TRANSIENT CEREBRAL ISCHEMIAS: ICD-10-CM

## 2022-01-26 DIAGNOSIS — N18.31 TYPE 2 DIABETES MELLITUS WITH STAGE 3A CHRONIC KIDNEY DISEASE, WITHOUT LONG-TERM CURRENT USE OF INSULIN (HCC): Primary | ICD-10-CM

## 2022-01-26 DIAGNOSIS — E11.42 DIABETIC POLYNEUROPATHY ASSOCIATED WITH TYPE 2 DIABETES MELLITUS (HCC): ICD-10-CM

## 2022-01-26 DIAGNOSIS — N18.32 CHRONIC KIDNEY DISEASE, STAGE 3B (HCC): ICD-10-CM

## 2022-01-26 DIAGNOSIS — I73.9 CLAUDICATION OF RIGHT LOWER EXTREMITY (HCC): ICD-10-CM

## 2022-01-26 DIAGNOSIS — E55.9 VITAMIN D DEFICIENCY: ICD-10-CM

## 2022-01-26 PROCEDURE — 3066F NEPHROPATHY DOC TX: CPT | Performed by: FAMILY MEDICINE

## 2022-01-26 PROCEDURE — 1101F PT FALLS ASSESS-DOCD LE1/YR: CPT | Performed by: FAMILY MEDICINE

## 2022-01-26 PROCEDURE — 3008F BODY MASS INDEX DOCD: CPT | Performed by: FAMILY MEDICINE

## 2022-01-26 PROCEDURE — 99214 OFFICE O/P EST MOD 30 MIN: CPT | Performed by: FAMILY MEDICINE

## 2022-01-26 RX ORDER — DAPAGLIFLOZIN 10 MG/1
10 TABLET, FILM COATED ORAL DAILY
Qty: 90 TABLET | Refills: 1 | Status: SHIPPED | OUTPATIENT
Start: 2022-01-26

## 2022-01-26 RX ORDER — ASPIRIN AND DIPYRIDAMOLE 25; 200 MG/1; MG/1
1 CAPSULE, EXTENDED RELEASE ORAL EVERY 12 HOURS
Qty: 180 CAPSULE | Refills: 0 | Status: SHIPPED | OUTPATIENT
Start: 2022-01-26

## 2022-01-26 NOTE — PATIENT INSTRUCTIONS
10% - bad control"> 10% - bad control,Hemoglobin A1c (HbA1c) greater than 10% indicating poor diabetic control,Haemoglobin A1c greater than 10% indicating poor diabetic control">   Diabetes Mellitus Type 2 in Adults, Ambulatory Care   GENERAL INFORMATION:   Diabetes mellitus type 2  is a disease that affects how your body uses glucose (sugar)  Insulin helps move sugar out of the blood so it can be used for energy  Normally, when the blood sugar level increases, the pancreas makes more insulin  Type 2 diabetes develops because either the body cannot make enough insulin, or it cannot use the insulin correctly  After many years, your pancreas may stop making insulin  Common symptoms include the following:   · More hunger or thirst than usual     · Frequent urination     · Weight loss without trying     · Blurred vision  Seek immediate care for the following symptoms:   · Severe abdominal pain, or pain that spreads to your back  You may also be vomiting  · Trouble staying awake or focusing    · Shaking or sweating    · Blurred or double vision    · Breath has a fruity, sweet smell    · Breathing is deep and labored, or rapid and shallow    · Heartbeat is fast and weak  Treatment for diabetes mellitus type 2  includes keeping your blood sugar at a normal level  You must eat the right foods, and exercise regularly  You may also need medicine if you cannot control your blood sugar level with nutrition and exercise  Manage diabetes mellitus type 2:   · Check your blood sugar level  You will be taught how to check a small drop of blood in a glucose monitor  Ask your healthcare provider when and how often to check during the day  Ask your healthcare provider what your blood sugar levels should be when you check them  · Keep track of carbohydrates (sugar and starchy foods)  Your blood sugar level can get too high if you eat too many carbohydrates   Your dietitian will help you plan meals and snacks that have the right amount of carbohydrates  · Eat low-fat foods  Some examples are skinless chicken and low-fat milk  · Eat less sodium (salt)  Some examples of high-sodium foods to limit are soy sauce, potato chips, and soup  Do not add salt to food you cook  Limit your use of table salt  · Eat high-fiber foods  Foods that are a good source of fiber include vegetables, whole grain bread, and beans  · Limit alcohol  Alcohol affects your blood sugar level and can make it harder to manage your diabetes  Women should limit alcohol to 1 drink a day  Men should limit alcohol to 2 drinks a day  A drink of alcohol is 12 ounces of beer, 5 ounces of wine, or 1½ ounces of liquor  · Get regular exercise  Exercise can help keep your blood sugar level steady, decrease your risk of heart disease, and help you lose weight  Exercise for at least 30 minutes, 5 days a week  Include muscle strengthening activities 2 days each week  Work with your healthcare provider to create an exercise plan  · Check your feet each day  for injuries or open sores  Ask your healthcare provider for activities you can do if you have an open sore  · Quit smoking  If you smoke, it is never too late to quit  Smoking can worsen the problems that may occur with diabetes  Ask your healthcare provider for information about how to stop smoking if you are having trouble quitting  · Ask about your weight:  Ask healthcare providers if you need to lose weight, and how much to lose  Ask them to help you with a weight loss program  Even a 10 to 15 pound weight loss can help you manage your blood sugar level  · Carry medical alert identification  Wear medical alert jewelry or carry a card that says you have diabetes  Ask your healthcare provider where to get these items  · Ask about vaccines  Diabetes puts you at risk of serious illness if you get the flu, pneumonia, or hepatitis   Ask your healthcare provider if you should get a flu, pneumonia, or hepatitis B vaccine, and when to get the vaccine  Follow up with your healthcare provider as directed:  Write down your questions so you remember to ask them during your visits  CARE AGREEMENT:   You have the right to help plan your care  Learn about your health condition and how it may be treated  Discuss treatment options with your caregivers to decide what care you want to receive  You always have the right to refuse treatment  The above information is an  only  It is not intended as medical advice for individual conditions or treatments  Talk to your doctor, nurse or pharmacist before following any medical regimen to see if it is safe and effective for you  © 2014 [address] is for End User's use only and may not be sold, redistributed or otherwise used for commercial purposes  All illustrations and images included in CareNotes® are the copyrighted property of A D A M , Inc  or [employer]

## 2022-01-31 NOTE — ASSESSMENT & PLAN NOTE
A chronic secondary peripheral vascular disease of the lower extremity status post stent follow-up with the vascular surgeon on statin and anticoagulation

## 2022-01-31 NOTE — ASSESSMENT & PLAN NOTE
A chronic status post stent patient follow-up with the cardiovascular surgeon he is already on statin and on Aggrenox a discussed important control risk factor including lipid and the with a goal LDL below 70 and important control his blood sugar

## 2022-01-31 NOTE — PROGRESS NOTES
Subjective:   Chief Complaint   Patient presents with    Follow-up     chronic conditions        Patient ID: Lamar Lopez is a 70 y o  male  Patient here follow-up with a chronic condition including non-insulin-dependent diabetic peripheral vascular disease B12 deficiency chronic kidney disease patient compliant with the medication tolerated well without side effect but not compliant with the low carb diet recent blood work show hemoglobin A1c above 11      The following portions of the patient's history were reviewed and updated as appropriate: allergies, current medications, past family history, past medical history, past social history, past surgical history and problem list     Review of Systems   Constitutional: Negative for activity change, appetite change, fatigue and fever  HENT: Negative for congestion, ear pain, sinus pressure, sinus pain and sore throat  Eyes: Negative for pain, discharge, redness and itching  Respiratory: Negative for cough, chest tightness, shortness of breath and stridor  Cardiovascular: Negative for chest pain, palpitations and leg swelling  Gastrointestinal: Negative for abdominal pain, blood in stool, constipation, diarrhea and nausea  Genitourinary: Negative for dysuria, flank pain, frequency and hematuria  Musculoskeletal: Negative for back pain, joint swelling and neck pain  Skin: Negative for pallor and rash  Neurological: Negative for dizziness, tremors, weakness, numbness and headaches  Hematological: Does not bruise/bleed easily  Objective:  Vitals:    01/26/22 0815   BP: 120/70   Pulse: 60   Temp: 98 3 °F (36 8 °C)   TempSrc: Tympanic   SpO2: 98%   Weight: 75 3 kg (166 lb)   Height: 5' 10" (1 778 m)      Physical Exam  Constitutional:       General: He is not in acute distress  Appearance: He is well-developed  He is not diaphoretic  HENT:      Head: Normocephalic and atraumatic        Nose: Nose normal    Eyes: General: No scleral icterus  Right eye: No discharge  Left eye: No discharge  Conjunctiva/sclera: Conjunctivae normal    Neck:      Vascular: No JVD  Comments: No visible masses  Cardiovascular:      Rate and Rhythm: Normal rate and regular rhythm  Pulmonary:      Effort: Pulmonary effort is normal  No respiratory distress  Breath sounds: Normal breath sounds  No stridor  No wheezing or rales  Abdominal:      General: There is no distension  Palpations: Abdomen is soft  Comments: Per patient abdomen is soft nontender and also abdomen not distended   Patient deny any visible or palpable bulging so just hernia  I was able to visualize the abdomen no erythema no distension no bulging so just mass or hernia   Musculoskeletal:         General: Normal range of motion  Cervical back: Normal range of motion and neck supple  Lymphadenopathy:      Cervical: No cervical adenopathy  Skin:     Coloration: Skin is not pale  Findings: No rash  Neurological:      Mental Status: He is alert and oriented to person, place, and time     Psychiatric:         Behavior: Behavior normal            Assessment/Plan:    Diabetic polyneuropathy associated with type 2 diabetes mellitus (HCC)    Lab Results   Component Value Date    HGBA1C 11 4 (H) 01/24/2022     Chronic uncontrol ,patient on Metformin 1000 mg po BID ,Farxiga 5 mg po/day he been complaint with med not with low carb diet   Discuss with patient with HA1C 11 4 he is candidate for insulin patient declined   Plan to continue Metformin 1000 mg po BID ,increase Farxiga to 10 mg   Discuss refer patient to Endo he declined it for today  Patient on statin     Type 2 diabetes mellitus, without long-term current use of insulin ([facility]    Lab Results   Component Value Date    HGBA1C 11 4 (H) 01/24/2022   Chronic uncontrolled discussed with the patient was his hemoglobin A1c important for he is candidate for insulin patient decline it he would like to continue manage it on oral pills will continue metformin 1000 twice a day increase Farxiga a to 10 mg once a day proper use and possible side effect discussed with the patient low carb diet important lose weight review with the patient    Atherosclerosis of native arteries of right leg with ulceration of other part of foot ([facility] )  A chronic status post stent patient follow-up with the cardiovascular surgeon he is already on statin and on Aggrenox a discussed important control risk factor including lipid and the with a goal LDL below 70 and important control his blood sugar    Chronic kidney disease, stage 3b ([facility] )  Lab Results   Component Value Date    EGFR 79 01/24/2022    EGFR 70 10/30/2021    EGFR 80 10/27/2021    CREATININE 0 96 01/24/2022    CREATININE 1 07 10/30/2021    CREATININE 0 96 10/27/2021     A chronic improve in his GFR and creatinine discussed with the patient and chronic kidney disease can be secondary to multifactorial including uncontrolled diabetic vascular disease patient recommended to avoid nonsteroidal anti-inflammatory drugs keep will hydration he is at the goal for the blood pressure below 130/80 but he is not at the goal for the hemoglobin A1c    Pulmonary nodule seen on imaging study  After reviewing the patient chart he had a CT scan in 2017 of the chest to had the pulmonary nodule recommendation at the time to repeated CT scan in 1 year no follow-up I discussed with the patient result and I recommend the a CT scan of the chest without contrast to monitor the size of the nodule patient agree    Claudication of right lower extremity ([facility] )  A chronic secondary peripheral vascular disease of the lower extremity status post stent follow-up with the vascular surgeon on statin and anticoagulation    B12 neuropathy ([facility] )  A chronic patient on B12 supplement continue       Diagnoses and all orders for this visit:    Type 2 diabetes mellitus with stage 3a chronic kidney disease, without long-term current use of insulin ([facility])  -     metFORMIN (GLUCOPHAGE) 1000 MG tablet; Take 1 tablet (1,000 mg total) by mouth 2 (two) times a day with meals  -     Dapagliflozin Propanediol (Farxiga) 10 MG TABS; Take 1 tablet (10 mg total) by mouth in the morning  -     Hemoglobin A1C; Future  -     Microalbumin / creatinine urine ratio; Future  -     TSH, 3rd generation with Free T4 reflex; Future  -     CBC and differential; Future  -     Lipid Panel with Direct LDL reflex; Future    Diabetic polyneuropathy associated with type 2 diabetes mellitus (HCC)  -     CBC and differential; Future  -     Lipid Panel with Direct LDL reflex; Future    B12 neuropathy ([facility])  -     CBC and differential; Future  -     Lipid Panel with Direct LDL reflex; Future    Chronic kidney disease, stage 3b ([facility])  -     CBC and differential; Future  -     Lipid Panel with Direct LDL reflex; Future    Claudication of right lower extremity ([facility])  -     CBC and differential; Future  -     Lipid Panel with Direct LDL reflex; Future    Other specified transient cerebral ischemias  -     aspirin-dipyridamole (AGGRENOX)  mg per 12 hr capsule; Take 1 capsule by mouth every 12 (twelve) hours    CIDP (chronic inflammatory demyelinating polyneuropathy) ([facility])  -     CBC and differential; Future  -     Lipid Panel with Direct LDL reflex; Future    Pulmonary nodule seen on imaging study  -     CT chest wo contrast; Future    Vitamin D deficiency  -     CBC and differential; Future  -     Lipid Panel with Direct LDL reflex;  Future    Atherosclerosis of native arteries of right leg with ulceration of other part of foot ([facility] )

## 2022-01-31 NOTE — ASSESSMENT & PLAN NOTE
Lab Results   Component Value Date    HGBA1C 11 4 (H) 01/24/2022     Chronic uncontrol ,patient on Metformin 1000 mg po BID ,Farxiga 5 mg po/day he been complaint with med not with low carb diet   Discuss with patient with HA1C 11 4 he is candidate for insulin patient declined   Plan to continue Metformin 1000 mg po BID ,increase Farxiga to 10 mg   Discuss refer patient to [facility] he declined it for today  Patient on statin

## 2022-01-31 NOTE — ASSESSMENT & PLAN NOTE
Lab Results   Component Value Date    EGFR 79 01/24/2022    EGFR 70 10/30/2021    EGFR 80 10/27/2021    CREATININE 0 96 01/24/2022    CREATININE 1 07 10/30/2021    CREATININE 0 96 10/27/2021     A chronic improve in his GFR and creatinine discussed with the patient and chronic kidney disease can be secondary to multifactorial including uncontrolled diabetic vascular disease patient recommended to avoid nonsteroidal anti-inflammatory drugs keep will hydration he is at the goal for the blood pressure below 130/80 but he is not at the goal for the hemoglobin A1c

## 2022-01-31 NOTE — ASSESSMENT & PLAN NOTE
After reviewing the patient chart he had a CT scan in 2017 of the chest to had the pulmonary nodule recommendation at the time to repeated CT scan in 1 year no follow-up I discussed with the patient result and I recommend the a CT scan of the chest without contrast to monitor the size of the nodule patient agree

## 2022-01-31 NOTE — ASSESSMENT & PLAN NOTE
Lab Results   Component Value Date    HGBA1C 11 4 (H) 01/24/2022   Chronic uncontrolled discussed with the patient was his hemoglobin A1c important for he is candidate for insulin patient decline it he would like to continue manage it on oral pills will continue metformin 1000 twice a day increase Farxiga a to 10 mg once a day proper use and possible side effect discussed with the patient low carb diet important lose weight review with the patient

## 2022-02-01 ENCOUNTER — OFFICE VISIT (OUTPATIENT)
Dept: FAMILY MEDICINE CLINIC | Facility: CLINIC | Age: 72
End: 2022-02-01
Payer: COMMERCIAL

## 2022-02-01 DIAGNOSIS — E11.9 TYPE 2 DIABETES MELLITUS WITHOUT COMPLICATION, WITHOUT LONG-TERM CURRENT USE OF INSULIN (HCC): Primary | ICD-10-CM

## 2022-02-01 PROCEDURE — G0109 DIAB MANAGE TRN IND/GROUP: HCPCS | Performed by: DIETITIAN, REGISTERED

## 2022-02-01 NOTE — PROGRESS NOTES
Living Well with Diabetes Group Class #1    Irlanda Poncedede attended the Living Well with Diabetes Group Class #1  Topics Covered in class today include: What is diabetes; Types of Diabetes; How Diabetes is diagnosed; Management skills; the role of exercise in blood sugar managements, Home glucose monitoring, and target ranges  Jadiel Sorto participated in group activities    The patient's history was reviewed and updated as appropriate: allergies, current medications  Lab Results   Component Value Date    HGBA1C 11 4 (H) 01/24/2022       Diabetes Education Record  Jadielstar Sorto was provided Living Well with Diabetes Class #1 book, handout on hypoglycemia and treatment and A1c chart  Patient response to instruction    Comprehensiongood  Motivationpoor  Expected Compliancepoor    Begin Time: 10:15  End Time: 11:15  Referring Provider: Dr Christine Francois    Thank you for referring your patient to [facility], it was a pleasure working with them today  Please feel free to call with any questions or concerns      Josemanuel Santillan, [address] [place] [phone]

## 2022-02-01 NOTE — PROGRESS NOTES
dm  Type 2 Diabetes Class Assessment    HPI: Met with Manuela Jackson for DSME Initial visit  Mary Stevenson has Type 2 Diabetes  He does not appear to be interested in making lifestyle changes  He mentioned that if he doesn't like a doctor he will fired them  Discussed DSME classes but patient isn't sure if he will attend  Diabetes Assessment  Visit Type: Initial visit  Present at Session: patient   Medical Diagnosis/ICD 10: E11 9 (ICD-10-CM) - Type 2 diabetes mellitus without complication, without long-term current use of insulin ([facility] )  Special Learning Needs: No  Barriers to Learning: no barriers    How do you feel about making lifestyle changes at this time? "not good at all"  How would you rate your current knowledge of diabetes? poor  How confident are you that will be able to take better control of your diabetes?: poor    How long have you had diabetes? ~ 16 years  Have you had diabetes education in the past?: No  Do you have any family members with diabetes?: Yes  Do you monitor your blood sugar? No, "I don't want to  That is the doctor's job"  Type of blood sugar monitor: na  How old is your meter?: na  How often do you test your blood sugars?:na  Do you keep a written record of your blood sugars? No   Blood sugar log with patient today and reviewed by educator?: No   Blood Sugar ranges:    Fasting:na   Before meals: na   2 hours after meals: na  Any financial concerns pertaining to your diabetes supplies, medication or care?: No  Have you ever experienced hypoglycemia?:  No  Have you ever been hospitalized or gone to the ER due to your blood sugars?: No  How do you treat low blood sugars?: educated patient   How do you treat high blood sugars?  Educated patient  Do you wear a Diabetes I D ?: no  Where do you dispose of your sharps (needles,lancetes)?: na    Ht Readings from Last 1 Encounters:   01/26/22 5' 10" (1 778 m)     Wt Readings from Last 1 Encounters:   01/26/22 75 3 kg (166 lb)     BMI 21, low end of healthy for age group  Weight Change: gradual wt loss noted; 10/28/2020 wt 184#; 10/11/2021 wt 164#    Diet Assessment    Do you follow any special diet presently?: No  Who cooks at home?:  patient  Who does the grocery shopping?: patient   How frequently do you eat out?: never    Activity Assessment    Exercise: no; patient reports 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 and eventually he was able to walk again  Lifestyle/Social Assessment    Racial/ethnic group:                                       Primary Language: English  Marital Status:   Education Level: High School Graduate   Work status: Retired; he is working PT at [employer] from Spring to Fall  Type of job and hours: was full time; worked for [employer] [facility] [place] [place] [employer] [employer] [facility]  Who lives in your household?: patient  Who is you primary support person(s): child   Describe your quality of life currently?: poor  Any concerns for your safety?: No  Any Yazidi or cultural practices that may affect your diabetes care: No  Do you have a decrease or loss of hearing?: Yes, does not wear hearing aides  Do you have a decrease or loss of vision?: Yes, wears prescription glasses  When was the last time you had an ophthalmology exam?: this past year  When was the last time you had dental exam?: every year  Do you check your feet for cracks, sores, debris?: Yes  When was the last time you had podiatry or foot exam?: follows with Dr Liberty Gupta  Last flu shot?: refused  Pneumonia shot?: No  Covid vaccine:  yes      Lab Results   Component Value Date    HGBA1C 11 4 (H) 01/24/2022     Lab Results   Component Value Date    HDL 62 01/24/2022    [address] 29 01/24/2022    TRIG 93 01/24/2022     No results found for: Sunita Taylor      The patient's history was reviewed and updated as appropriate: allergies, current medications      Intervention    Diabetes Overview :   Lisa Renteria was instructed on basic concepts of diabetes, including identifying role of diabetes self management, basic pathophysiology and types of diabetes, A1c and blood sugar targets  Nasir Has has good understanding of material covered  Taking Medications: Instructed patient on action, side effects, efficacy, prescribed dosage and appropriate timing and frequency of administration of his diabetes medication  Nasir Has has good understanding of material covered  Monitoring Blood Sugars  Instructions for Meter Teaching- Patient refuses to test blood sugars even with encouragement  He continued to state "that's my doctor's job"  Goal Blood Sugars:   Premeal , even better <110  2hr after a meal <180, even better <140  A1C <7%, even better <6 5%  Hypoglycemia: Instructed patient on definition/risk of hypoglycemia, treatment, causes/symptoms, when to notify provider of lows, prevention of hypoglycemia and exercise precautions  Comments: Nasir Has verbalizes understanding of hypoglycemia concepts      Physical Activity: Discussed benefits of physical activity to optimize blood glucose control, encouraged activity at patient is physically able  Always consult a physician prior to starting an exercise program   Comments: Carolina Landa understanding of hypoglycemia concepts        Diabetes Education Record  Nasir Has received the following handouts: diabetes goals for blood sugar control, A1c chart, basic carb counting and food label  Patient response to instruction    Comprehensionfair  Motivationpoor  Expected Compliancepoor  Response to Teachback: 100%, demonstrated understanding    Begin Time: 9 am  End Time: 10 am  Referring Provider: Dr Rachele Irene    Thank you for referring your patient to [facility], it was a pleasure working with them today  Please feel free to call with any questions or concerns      Edwardo Moise, [address] [facility] [phone]

## 2022-02-15 ENCOUNTER — OFFICE VISIT (OUTPATIENT)
Dept: DIABETES SERVICES | Facility: OTHER | Age: 72
End: 2022-02-15
Payer: COMMERCIAL

## 2022-02-15 DIAGNOSIS — E11.9 TYPE 2 DIABETES MELLITUS WITHOUT COMPLICATION, WITHOUT LONG-TERM CURRENT USE OF INSULIN (HCC): Primary | ICD-10-CM

## 2022-02-15 PROCEDURE — G0109 DIAB MANAGE TRN IND/GROUP: HCPCS | Performed by: DIETITIAN, REGISTERED

## 2022-02-15 NOTE — PROGRESS NOTES
Living Well with Diabetes Group Class #3    Sommer Gil attended the Living Well with Diabetes Group Class #3  During class, Bright Garza was instructed on the following topics: Oral and injectable medications, short term complications of diabetes, long term complications of diabetes, prevention of complications, foot care, sick day management, stress management, and traveling with diabetes  Bright Garza participated in group activities  Bright Garza will follow up with class #4  Will call with any questions or concerns prior to next session  The patient's history was reviewed and updated as appropriate: allergies, current medications  Lab Results   Component Value Date    HGBA1C 11 4 (H) 01/24/2022       Diabetes Education Record  Bright Garza was provided Living Well with Diabetes Class #3 book, s/s hypoglycemia with treatment handout and coupon for DM ID necklace  Patient response to instruction    Comprehensiongood  Motivationpoor  Expected Compliancepoor    Begin Time: 10:05 am  End Time: 11:05  Referring Provider: Dr Urena Apo    Thank you for referring your patient to [facility], it was a pleasure working with them today  Please feel free to call with any questions or concerns      Nitesh Abraham, RD  [address] [facility] [address]  [phone]

## 2022-02-16 ENCOUNTER — HOSPITAL ENCOUNTER (OUTPATIENT)
Dept: NON INVASIVE DIAGNOSTICS | Facility: HOSPITAL | Age: 72
Discharge: HOME | End: 2022-02-16
Attending: SURGERY
Payer: COMMERCIAL

## 2022-02-16 DIAGNOSIS — I73.9 CLAUDICATION OF LEFT LOWER EXTREMITY (HCC): ICD-10-CM

## 2022-02-16 DIAGNOSIS — I70.221 ATHEROSCLEROSIS OF NATIVE ARTERIES OF EXTREMITIES WITH REST PAIN, RIGHT LEG (HCC): ICD-10-CM

## 2022-02-16 PROCEDURE — 93925 LOWER EXTREMITY STUDY: CPT

## 2022-02-16 PROCEDURE — 93923 UPR/LXTR ART STDY 3+ LVLS: CPT

## 2022-02-17 PROCEDURE — 93922 UPR/L XTREMITY ART 2 LEVELS: CPT | Performed by: SURGERY

## 2022-02-17 PROCEDURE — 93925 LOWER EXTREMITY STUDY: CPT | Performed by: SURGERY

## 2022-02-21 ENCOUNTER — VBI (OUTPATIENT)
Dept: ADMINISTRATIVE | Facility: OTHER | Age: 72
End: 2022-02-21

## 2022-02-21 DIAGNOSIS — I70.221 ATHEROSCLEROSIS OF NATIVE ARTERIES OF EXTREMITIES WITH REST PAIN, RIGHT LEG (HCC): ICD-10-CM

## 2022-02-21 DIAGNOSIS — E78.5 DYSLIPIDEMIA: ICD-10-CM

## 2022-02-21 RX ORDER — ATORVASTATIN CALCIUM 80 MG/1
80 TABLET, FILM COATED ORAL
Qty: 30 TABLET | Refills: 2 | Status: SHIPPED | OUTPATIENT
Start: 2022-02-21 | End: 2022-05-20

## 2022-02-22 ENCOUNTER — OFFICE VISIT (OUTPATIENT)
Dept: DIABETES SERVICES | Facility: OTHER | Age: 72
End: 2022-02-22
Payer: COMMERCIAL

## 2022-02-22 DIAGNOSIS — E11.9 TYPE 2 DIABETES MELLITUS WITHOUT COMPLICATION, WITHOUT LONG-TERM CURRENT USE OF INSULIN (HCC): Primary | ICD-10-CM

## 2022-02-22 PROCEDURE — G0109 DIAB MANAGE TRN IND/GROUP: HCPCS | Performed by: DIETITIAN, REGISTERED

## 2022-02-22 NOTE — PROGRESS NOTES
Living Well with Diabetes Group Class #4    Natalie Kimble attended the Living Well with Diabetes Group Class #4  During class, Regino Stafford was instructed on the following topics: Types of cholesterol, dietary sources of cholesterol, types of fat, types of fiber, reading food labels- in depth, healthy choices when dining out  Regino Francess participated in group activities of reading labels together and completed the dining out activity  Regino Stafford will follow up with class #5 or additional DSMT/MNT as desired  Will call with any questions or concerns prior to next session  The patient's history was reviewed and updated as appropriate: allergies, current medications  Lab Results   Component Value Date    HGBA1C 11 4 (H) 01/24/2022       Diabetes Education Record  Regino Stafford was provided Living Well with Diabetes Class #4 book and Comparison of Dietary Fats  Patient response to instruction    Comprehensiongood  Motivationgood  Expected Compliancegood    Begin Time: 10:10 am  End Time: 11:10 am  Referring Provider: Dr Jyoti Hennessy    Thank you for referring your patient to [facility], it was a pleasure working with them today  Please feel free to call with any questions or concerns      Santiago Alicea, RD  [address] [place]  [phone]

## 2022-03-15 ENCOUNTER — OFFICE VISIT (OUTPATIENT)
Dept: DIABETES SERVICES | Facility: OTHER | Age: 72
End: 2022-03-15
Payer: COMMERCIAL

## 2022-03-15 DIAGNOSIS — E11.9 TYPE 2 DIABETES MELLITUS WITHOUT COMPLICATION, WITHOUT LONG-TERM CURRENT USE OF INSULIN (HCC): Primary | ICD-10-CM

## 2022-03-15 PROCEDURE — G0109 DIAB MANAGE TRN IND/GROUP: HCPCS | Performed by: DIETITIAN, REGISTERED

## 2022-03-16 NOTE — PROGRESS NOTES
Living Well with Diabetes Group Class #2    Yann Never attended the Living Well with Diabetes Group Class #2  During class, Felisha Gabriel was instructed on the following topics: Macronutrients, Carbohydrate sources, What one serving of carbohydrate equals, effects of diet on blood glucose levels, effect of carbohydrates on blood glucose levels, basics of meal planning: balance, portions, meal times, measurements, reading food labels to determine carbohydrates  Felisha Gabriel participated in group activities of reading labels together and completing the meal planning activity  Felisha Gabriel will follow up with class #3  Will call with any questions or concerns prior to next session  The patient's history was reviewed and updated as appropriate: allergies, current medications  Lab Results   Component Value Date    HGBA1C 11 4 (H) 01/24/2022       Diabetes Education Record  Felisha Gabriel was provided Living Well with Diabetes Class #2 book, portion booklet, websites, coupon for ID wear and certificate of completion  Patient response to instruction    Comprehensiongood  Motivationpoor  Expected Compliancepoor    Begin Time: 5:15 pm  End Time: 6:16 pm  Referring Provider: Dr Sena Murcia    Thank you for referring your patient to [facility], it was a pleasure working with them today  Please feel free to call with any questions or concerns      Yasmany Waller, RD  [address] [place]  [phone]

## 2022-03-22 ENCOUNTER — TRANSCRIBE ORDERS (OUTPATIENT)
Dept: VASCULAR SURGERY | Facility: CLINIC | Age: 72
End: 2022-03-22

## 2022-03-22 DIAGNOSIS — I73.9 CLAUDICATION OF LEFT LOWER EXTREMITY (HCC): Primary | ICD-10-CM

## 2022-04-12 ENCOUNTER — APPOINTMENT (OUTPATIENT)
Dept: LAB | Facility: MEDICAL CENTER | Age: 72
End: 2022-04-12
Payer: COMMERCIAL

## 2022-04-12 DIAGNOSIS — E53.8 B12 NEUROPATHY (HCC): ICD-10-CM

## 2022-04-12 DIAGNOSIS — I73.9 CLAUDICATION OF RIGHT LOWER EXTREMITY (HCC): ICD-10-CM

## 2022-04-12 DIAGNOSIS — E55.9 VITAMIN D DEFICIENCY: ICD-10-CM

## 2022-04-12 DIAGNOSIS — G63 B12 NEUROPATHY (HCC): ICD-10-CM

## 2022-04-12 DIAGNOSIS — E11.22 TYPE 2 DIABETES MELLITUS WITH STAGE 3A CHRONIC KIDNEY DISEASE, WITHOUT LONG-TERM CURRENT USE OF INSULIN (HCC): ICD-10-CM

## 2022-04-12 DIAGNOSIS — N18.31 TYPE 2 DIABETES MELLITUS WITH STAGE 3A CHRONIC KIDNEY DISEASE, WITHOUT LONG-TERM CURRENT USE OF INSULIN (HCC): ICD-10-CM

## 2022-04-12 DIAGNOSIS — G61.81 CIDP (CHRONIC INFLAMMATORY DEMYELINATING POLYNEUROPATHY) (HCC): ICD-10-CM

## 2022-04-12 DIAGNOSIS — E11.42 DIABETIC POLYNEUROPATHY ASSOCIATED WITH TYPE 2 DIABETES MELLITUS (HCC): ICD-10-CM

## 2022-04-12 DIAGNOSIS — N18.32 CHRONIC KIDNEY DISEASE, STAGE 3B (HCC): ICD-10-CM

## 2022-04-12 LAB
BASOPHILS # BLD AUTO: 0.03 THOUSANDS/ΜL (ref 0–0.1)
BASOPHILS NFR BLD AUTO: 0 % (ref 0–1)
CHOLEST SERPL-MCNC: 83 MG/DL
CREAT UR-MCNC: 157 MG/DL
EOSINOPHIL # BLD AUTO: 0.06 THOUSAND/ΜL (ref 0–0.61)
EOSINOPHIL NFR BLD AUTO: 1 % (ref 0–6)
ERYTHROCYTE [DISTWIDTH] IN BLOOD BY AUTOMATED COUNT: 12.7 % (ref 11.6–15.1)
EST. AVERAGE GLUCOSE BLD GHB EST-MCNC: 306 MG/DL
HBA1C MFR BLD: 12.3 %
HCT VFR BLD AUTO: 46.7 % (ref 36.5–49.3)
HDLC SERPL-MCNC: 48 MG/DL
HGB BLD-MCNC: 15.2 G/DL (ref 12–17)
IMM GRANULOCYTES # BLD AUTO: 0.01 THOUSAND/UL (ref 0–0.2)
IMM GRANULOCYTES NFR BLD AUTO: 0 % (ref 0–2)
LDLC SERPL CALC-MCNC: 24 MG/DL (ref 0–100)
LYMPHOCYTES # BLD AUTO: 1.65 THOUSANDS/ΜL (ref 0.6–4.47)
LYMPHOCYTES NFR BLD AUTO: 24 % (ref 14–44)
MCH RBC QN AUTO: 30.3 PG (ref 26.8–34.3)
MCHC RBC AUTO-ENTMCNC: 32.5 G/DL (ref 31.4–37.4)
MCV RBC AUTO: 93 FL (ref 82–98)
MICROALBUMIN UR-MCNC: 40.4 MG/L (ref 0–20)
MICROALBUMIN/CREAT 24H UR: 26 MG/G CREATININE (ref 0–30)
MONOCYTES # BLD AUTO: 0.58 THOUSAND/ΜL (ref 0.17–1.22)
MONOCYTES NFR BLD AUTO: 9 % (ref 4–12)
NEUTROPHILS # BLD AUTO: 4.51 THOUSANDS/ΜL (ref 1.85–7.62)
NEUTS SEG NFR BLD AUTO: 66 % (ref 43–75)
NRBC BLD AUTO-RTO: 0 /100 WBCS
PLATELET # BLD AUTO: 208 THOUSANDS/UL (ref 149–390)
PMV BLD AUTO: 10.6 FL (ref 8.9–12.7)
RBC # BLD AUTO: 5.02 MILLION/UL (ref 3.88–5.62)
TRIGL SERPL-MCNC: 57 MG/DL
TSH SERPL DL<=0.05 MIU/L-ACNC: 2.68 UIU/ML (ref 0.45–4.5)
WBC # BLD AUTO: 6.84 THOUSAND/UL (ref 4.31–10.16)

## 2022-04-12 PROCEDURE — 82570 ASSAY OF URINE CREATININE: CPT

## 2022-04-12 PROCEDURE — 83036 HEMOGLOBIN GLYCOSYLATED A1C: CPT

## 2022-04-12 PROCEDURE — 85025 COMPLETE CBC W/AUTO DIFF WBC: CPT

## 2022-04-12 PROCEDURE — 36415 COLL VENOUS BLD VENIPUNCTURE: CPT

## 2022-04-12 PROCEDURE — 84443 ASSAY THYROID STIM HORMONE: CPT

## 2022-04-12 PROCEDURE — 80061 LIPID PANEL: CPT

## 2022-04-12 PROCEDURE — 82043 UR ALBUMIN QUANTITATIVE: CPT

## 2022-05-19 ENCOUNTER — TELEPHONE (OUTPATIENT)
Dept: FAMILY MEDICINE CLINIC | Facility: CLINIC | Age: 72
End: 2022-05-19

## 2022-05-19 NOTE — TELEPHONE ENCOUNTER
----- Message from Rajan Mayberry MD sent at 5/9/2022 12:33 PM EDT -----  Patient history of uncontrolled diabetic blood work on office visit

## 2022-08-15 ENCOUNTER — TELEPHONE (OUTPATIENT)
Dept: FAMILY MEDICINE CLINIC | Facility: CLINIC | Age: 72
End: 2022-08-15

## 2022-08-15 NOTE — TELEPHONE ENCOUNTER
Patient said that his metformin is giving him diarrhea and doesn't want to take it anymore  Wants to know what to do   Please advise

## 2022-08-16 ENCOUNTER — RA CDI HCC (OUTPATIENT)
Dept: OTHER | Facility: HOSPITAL | Age: 72
End: 2022-08-16

## 2022-08-16 DIAGNOSIS — E78.5 DYSLIPIDEMIA: ICD-10-CM

## 2022-08-16 DIAGNOSIS — I70.221 ATHEROSCLEROSIS OF NATIVE ARTERIES OF EXTREMITIES WITH REST PAIN, RIGHT LEG (HCC): ICD-10-CM

## 2022-08-16 RX ORDER — ATORVASTATIN CALCIUM 80 MG/1
TABLET, FILM COATED ORAL
Qty: 90 TABLET | Refills: 0 | Status: SHIPPED | OUTPATIENT
Start: 2022-08-16

## 2022-08-16 NOTE — PROGRESS NOTES
[facility]  coding opportunities       Chart reviewed, no opportunity found:   [address]        Patients Insurance     Medicare Insurance: [employer]age

## 2022-08-19 ENCOUNTER — OFFICE VISIT (OUTPATIENT)
Dept: FAMILY MEDICINE CLINIC | Facility: CLINIC | Age: 72
End: 2022-08-19
Payer: COMMERCIAL

## 2022-08-19 VITALS
HEIGHT: 70 IN | BODY MASS INDEX: 24.88 KG/M2 | DIASTOLIC BLOOD PRESSURE: 80 MMHG | WEIGHT: 173.8 LBS | HEART RATE: 60 BPM | OXYGEN SATURATION: 99 % | TEMPERATURE: 97.8 F | RESPIRATION RATE: 16 BRPM | SYSTOLIC BLOOD PRESSURE: 120 MMHG

## 2022-08-19 DIAGNOSIS — E78.5 DYSLIPIDEMIA: ICD-10-CM

## 2022-08-19 DIAGNOSIS — T88.7XXA SIDE EFFECT OF MEDICATION: ICD-10-CM

## 2022-08-19 DIAGNOSIS — E11.22 TYPE 2 DIABETES MELLITUS WITH STAGE 3A CHRONIC KIDNEY DISEASE, WITHOUT LONG-TERM CURRENT USE OF INSULIN (HCC): ICD-10-CM

## 2022-08-19 DIAGNOSIS — M25.552 HIP PAIN, CHRONIC, LEFT: ICD-10-CM

## 2022-08-19 DIAGNOSIS — G89.29 HIP PAIN, CHRONIC, LEFT: ICD-10-CM

## 2022-08-19 DIAGNOSIS — N18.32 CHRONIC KIDNEY DISEASE, STAGE 3B (HCC): ICD-10-CM

## 2022-08-19 DIAGNOSIS — E11.42 DIABETIC POLYNEUROPATHY ASSOCIATED WITH TYPE 2 DIABETES MELLITUS (HCC): Primary | ICD-10-CM

## 2022-08-19 DIAGNOSIS — N18.31 TYPE 2 DIABETES MELLITUS WITH STAGE 3A CHRONIC KIDNEY DISEASE, WITHOUT LONG-TERM CURRENT USE OF INSULIN (HCC): ICD-10-CM

## 2022-08-19 PROCEDURE — 3066F NEPHROPATHY DOC TX: CPT | Performed by: FAMILY MEDICINE

## 2022-08-19 PROCEDURE — 1160F RVW MEDS BY RX/DR IN RCRD: CPT | Performed by: FAMILY MEDICINE

## 2022-08-19 PROCEDURE — 99214 OFFICE O/P EST MOD 30 MIN: CPT | Performed by: FAMILY MEDICINE

## 2022-08-19 PROCEDURE — 3725F SCREEN DEPRESSION PERFORMED: CPT | Performed by: FAMILY MEDICINE

## 2022-08-19 RX ORDER — ACETAMINOPHEN 500 MG
500 TABLET ORAL EVERY 6 HOURS PRN
Qty: 30 TABLET | Refills: 1 | Status: SHIPPED | OUTPATIENT
Start: 2022-08-19

## 2022-08-21 PROBLEM — M25.552 HIP PAIN, CHRONIC, LEFT: Status: ACTIVE | Noted: 2022-08-19

## 2022-08-21 PROBLEM — T88.7XXA SIDE EFFECT OF MEDICATION: Status: ACTIVE | Noted: 2022-08-21

## 2022-08-21 PROBLEM — T88.7XXA SIDE EFFECT OF MEDICATION: Status: ACTIVE | Noted: 2022-08-19

## 2022-08-21 PROBLEM — G89.29 HIP PAIN, CHRONIC, LEFT: Status: ACTIVE | Noted: 2022-08-21

## 2022-08-21 PROBLEM — G89.29 HIP PAIN, CHRONIC, LEFT: Status: ACTIVE | Noted: 2022-08-19

## 2022-08-21 PROBLEM — M25.552 HIP PAIN, CHRONIC, LEFT: Status: ACTIVE | Noted: 2022-08-21

## 2022-08-21 NOTE — ASSESSMENT & PLAN NOTE
Lab Results   Component Value Date    HGBA1C 12 3 (H) 04/12/2022   Chronic uncontrol a patient stop metformin secondary to diarrhea patient candidate for insulin but he decline it  Also discussed with patient Trulicity and he decline it alternative including Jardiance 25 mg once a day proper use and possible side effect discussed the patient  Low the carb diet the discussed with the patient

## 2022-08-21 NOTE — ASSESSMENT & PLAN NOTE
New diagnosis chronic pain no history of fall or trauma recommend the Voltaren gel 4 g 4 times a day proper use and possible side effect discussed the patient

## 2022-08-21 NOTE — PROGRESS NOTES
Subjective:   Chief Complaint   Patient presents with    Follow-up     Chronic conditions    Hip Pain     Left hip pain           Patient ID: Flako Tomas is a 70 y o  male  Patient here concerned about diarrhea as side effect of Metformin deny any abdomen pain no nausea no vomiting no abdomen distension and no weight loss patient had the non-insulin-dependent diabetic but his diabetic uncontrolled he has the not compliant with the diet or medication the patient also concerned about left hip pain describe it as achy graded as a 6/10 localized in the hip no radiation no fall no trauma no swelling no redness      The following portions of the patient's history were reviewed and updated as appropriate: allergies, current medications, past family history, past medical history, past social history, past surgical history and problem list     Review of Systems   Constitutional: Negative for chills and fever  HENT: Negative for ear pain and sore throat  Eyes: Negative for pain and visual disturbance  Respiratory: Negative for cough and shortness of breath  Cardiovascular: Negative for chest pain and palpitations  Gastrointestinal: Positive for diarrhea  Negative for abdominal pain and vomiting  Genitourinary: Negative for dysuria and hematuria  Musculoskeletal: Positive for arthralgias  Negative for back pain  Left hip pain   Skin: Negative for color change and rash  Neurological: Negative for seizures and syncope  All other systems reviewed and are negative  Objective:  Vitals:    08/19/22 0823   BP: 120/80   BP Location: Left arm   Patient Position: Sitting   Cuff Size: Large   Pulse: 60   Resp: 16   Temp: 97 8 °F (36 6 °C)   TempSrc: Tympanic   SpO2: 99%   Weight: 78 8 kg (173 lb 12 8 oz)   Height: 5' 10" (1 778 m)      Physical Exam  Vitals and nursing note reviewed  Constitutional:       General: He is not in acute distress  Appearance: He is well-developed   He is not diaphoretic  HENT:      Head: Normocephalic  Right Ear: External ear normal       Left Ear: External ear normal       Nose: Nose normal       Mouth/Throat:      Pharynx: Oropharynx is clear  Eyes:      General:         Right eye: No discharge  Left eye: No discharge  Conjunctiva/sclera: Conjunctivae normal    Neck:      Vascular: No JVD  Cardiovascular:      Rate and Rhythm: Normal rate and regular rhythm  Heart sounds: Normal heart sounds  No murmur heard  No gallop  Pulmonary:      Effort: Pulmonary effort is normal  No respiratory distress  Breath sounds: Normal breath sounds  No stridor  No wheezing or rales  Chest:      Chest wall: No tenderness  Abdominal:      General: There is no distension  Palpations: Abdomen is soft  There is no mass  Tenderness: There is no abdominal tenderness  There is no rebound  Musculoskeletal:      Cervical back: Normal range of motion and neck supple  Left hip: Tenderness present  No crepitus  Decreased range of motion  Right lower leg: No edema  Left lower leg: No edema  Lymphadenopathy:      Cervical: No cervical adenopathy  Skin:     General: Skin is warm  Findings: No erythema or rash  Neurological:      Mental Status: He is alert and oriented to person, place, and time             Assessment/Plan:    Type 2 diabetes mellitus, without long-term current use of insulin ([facility])    Lab Results   Component Value Date    HGBA1C 12 3 (H) 04/12/2022   Chronic uncontrol a patient stop metformin secondary to diarrhea patient candidate for insulin but he decline it  Also discussed with patient Trulicity and he decline it alternative including Jardiance 25 mg once a day proper use and possible side effect discussed the patient  Low the carb diet the discussed with the patient    Hip pain, chronic, left  New diagnosis chronic pain no history of fall or trauma recommend the Voltaren gel 4 g 4 times a day proper use and possible side effect discussed the patient    Side effect of medication  Patient developed diarrhea side effect of the metformin recommend to start the metformin       Diagnoses and all orders for this visit:    Diabetic polyneuropathy associated with type 2 diabetes mellitus (HCC)  -     Empagliflozin (Jardiance) 25 MG TABS; Take 1 tablet (25 mg total) by mouth every morning  -     sitaGLIPtin (JANUVIA) 100 mg tablet; Take 1 tablet (100 mg total) by mouth daily  -     CBC and differential; Future  -     Basic metabolic panel; Future  -     Lipid Panel with Direct LDL reflex; Future  -     TSH, 3rd generation with Free T4 reflex; Future  -     Hemoglobin A1C; Future    Hip pain, chronic, left  -     acetaminophen (TYLENOL) 500 mg tablet; Take 1 tablet (500 mg total) by mouth every 6 (six) hours as needed for mild pain    Type 2 diabetes mellitus with stage 3a chronic kidney disease, without long-term current use of insulin (HCC)  -     CBC and differential; Future  -     Basic metabolic panel; Future  -     Lipid Panel with Direct LDL reflex; Future  -     TSH, 3rd generation with Free T4 reflex; Future  -     Hemoglobin A1C; Future    Dyslipidemia  -     CBC and differential; Future  -     Basic metabolic panel; Future  -     Lipid Panel with Direct LDL reflex; Future  -     TSH, 3rd generation with Free T4 reflex; Future  -     Hemoglobin A1C; Future    Chronic kidney disease, stage 3b (HCC)  -     CBC and differential; Future  -     Basic metabolic panel; Future  -     Lipid Panel with Direct LDL reflex; Future  -     TSH, 3rd generation with Free T4 reflex;  Future  -     Hemoglobin A1C; Future    Side effect of medication

## 2022-08-22 ENCOUNTER — HOSPITAL ENCOUNTER (OUTPATIENT)
Dept: NON INVASIVE DIAGNOSTICS | Facility: HOSPITAL | Age: 72
Discharge: HOME | End: 2022-08-22
Attending: SURGERY
Payer: COMMERCIAL

## 2022-08-22 DIAGNOSIS — I73.9 CLAUDICATION OF LEFT LOWER EXTREMITY (HCC): ICD-10-CM

## 2022-08-22 PROCEDURE — 93925 LOWER EXTREMITY STUDY: CPT

## 2022-08-22 PROCEDURE — 93923 UPR/LXTR ART STDY 3+ LVLS: CPT

## 2022-08-23 PROCEDURE — 93922 UPR/L XTREMITY ART 2 LEVELS: CPT | Performed by: SURGERY

## 2022-08-23 PROCEDURE — 93925 LOWER EXTREMITY STUDY: CPT | Performed by: SURGERY

## 2022-08-24 ENCOUNTER — TRANSCRIBE ORDERS (OUTPATIENT)
Dept: ADMINISTRATIVE | Facility: HOSPITAL | Age: 72
End: 2022-08-24

## 2022-08-24 DIAGNOSIS — I70.235 ATHEROSCLEROSIS OF NATIVE ARTERIES OF RIGHT LEG WITH ULCERATION OF OTHER PART OF FOOT (HCC): Primary | ICD-10-CM

## 2022-10-03 ENCOUNTER — APPOINTMENT (OUTPATIENT)
Dept: LAB | Facility: MEDICAL CENTER | Age: 72
End: 2022-10-03
Payer: COMMERCIAL

## 2022-10-03 DIAGNOSIS — N18.31 TYPE 2 DIABETES MELLITUS WITH STAGE 3A CHRONIC KIDNEY DISEASE, WITHOUT LONG-TERM CURRENT USE OF INSULIN (HCC): ICD-10-CM

## 2022-10-03 DIAGNOSIS — E78.5 DYSLIPIDEMIA: ICD-10-CM

## 2022-10-03 DIAGNOSIS — E11.22 TYPE 2 DIABETES MELLITUS WITH STAGE 3A CHRONIC KIDNEY DISEASE, WITHOUT LONG-TERM CURRENT USE OF INSULIN (HCC): ICD-10-CM

## 2022-10-03 DIAGNOSIS — N18.32 CHRONIC KIDNEY DISEASE, STAGE 3B (HCC): ICD-10-CM

## 2022-10-03 DIAGNOSIS — E11.42 DIABETIC POLYNEUROPATHY ASSOCIATED WITH TYPE 2 DIABETES MELLITUS (HCC): ICD-10-CM

## 2022-10-03 LAB
ANION GAP SERPL CALCULATED.3IONS-SCNC: 10 MMOL/L (ref 4–13)
BASOPHILS # BLD AUTO: 0.02 THOUSANDS/ΜL (ref 0–0.1)
BASOPHILS NFR BLD AUTO: 0 % (ref 0–1)
BUN SERPL-MCNC: 19 MG/DL (ref 5–25)
CALCIUM SERPL-MCNC: 9 MG/DL (ref 8.3–10.1)
CHLORIDE SERPL-SCNC: 107 MMOL/L (ref 96–108)
CHOLEST SERPL-MCNC: 95 MG/DL
CO2 SERPL-SCNC: 22 MMOL/L (ref 21–32)
CREAT SERPL-MCNC: 1.2 MG/DL (ref 0.6–1.3)
EOSINOPHIL # BLD AUTO: 0.04 THOUSAND/ΜL (ref 0–0.61)
EOSINOPHIL NFR BLD AUTO: 1 % (ref 0–6)
ERYTHROCYTE [DISTWIDTH] IN BLOOD BY AUTOMATED COUNT: 12.7 % (ref 11.6–15.1)
EST. AVERAGE GLUCOSE BLD GHB EST-MCNC: 246 MG/DL
GFR SERPL CREATININE-BSD FRML MDRD: 60 ML/MIN/1.73SQ M
GLUCOSE P FAST SERPL-MCNC: 244 MG/DL (ref 65–99)
HBA1C MFR BLD: 10.2 %
HCT VFR BLD AUTO: 52.9 % (ref 36.5–49.3)
HDLC SERPL-MCNC: 42 MG/DL
HGB BLD-MCNC: 16.9 G/DL (ref 12–17)
IMM GRANULOCYTES # BLD AUTO: 0.02 THOUSAND/UL (ref 0–0.2)
IMM GRANULOCYTES NFR BLD AUTO: 0 % (ref 0–2)
LDLC SERPL CALC-MCNC: 34 MG/DL (ref 0–100)
LYMPHOCYTES # BLD AUTO: 1.54 THOUSANDS/ΜL (ref 0.6–4.47)
LYMPHOCYTES NFR BLD AUTO: 19 % (ref 14–44)
MCH RBC QN AUTO: 30.5 PG (ref 26.8–34.3)
MCHC RBC AUTO-ENTMCNC: 31.9 G/DL (ref 31.4–37.4)
MCV RBC AUTO: 96 FL (ref 82–98)
MONOCYTES # BLD AUTO: 0.53 THOUSAND/ΜL (ref 0.17–1.22)
MONOCYTES NFR BLD AUTO: 7 % (ref 4–12)
NEUTROPHILS # BLD AUTO: 5.77 THOUSANDS/ΜL (ref 1.85–7.62)
NEUTS SEG NFR BLD AUTO: 73 % (ref 43–75)
NRBC BLD AUTO-RTO: 0 /100 WBCS
PLATELET # BLD AUTO: 227 THOUSANDS/UL (ref 149–390)
PMV BLD AUTO: 10.7 FL (ref 8.9–12.7)
POTASSIUM SERPL-SCNC: 4.7 MMOL/L (ref 3.5–5.3)
RBC # BLD AUTO: 5.54 MILLION/UL (ref 3.88–5.62)
SODIUM SERPL-SCNC: 139 MMOL/L (ref 135–147)
TRIGL SERPL-MCNC: 94 MG/DL
TSH SERPL DL<=0.05 MIU/L-ACNC: 1.58 UIU/ML (ref 0.45–4.5)
WBC # BLD AUTO: 7.92 THOUSAND/UL (ref 4.31–10.16)

## 2022-10-03 PROCEDURE — 36415 COLL VENOUS BLD VENIPUNCTURE: CPT

## 2022-10-03 PROCEDURE — 85025 COMPLETE CBC W/AUTO DIFF WBC: CPT

## 2022-10-03 PROCEDURE — 80048 BASIC METABOLIC PNL TOTAL CA: CPT

## 2022-10-03 PROCEDURE — 80061 LIPID PANEL: CPT

## 2022-10-03 PROCEDURE — 84443 ASSAY THYROID STIM HORMONE: CPT

## 2022-10-03 PROCEDURE — 83036 HEMOGLOBIN GLYCOSYLATED A1C: CPT

## 2022-10-11 ENCOUNTER — OFFICE VISIT (OUTPATIENT)
Dept: FAMILY MEDICINE CLINIC | Facility: CLINIC | Age: 72
End: 2022-10-11
Payer: COMMERCIAL

## 2022-10-11 VITALS
HEIGHT: 71 IN | OXYGEN SATURATION: 98 % | BODY MASS INDEX: 23.94 KG/M2 | HEART RATE: 90 BPM | TEMPERATURE: 97 F | WEIGHT: 171 LBS | DIASTOLIC BLOOD PRESSURE: 60 MMHG | SYSTOLIC BLOOD PRESSURE: 110 MMHG

## 2022-10-11 DIAGNOSIS — N18.31 TYPE 2 DIABETES MELLITUS WITH STAGE 3A CHRONIC KIDNEY DISEASE, WITHOUT LONG-TERM CURRENT USE OF INSULIN (HCC): Primary | ICD-10-CM

## 2022-10-11 DIAGNOSIS — M25.552 HIP PAIN, CHRONIC, LEFT: ICD-10-CM

## 2022-10-11 DIAGNOSIS — R91.1 PULMONARY NODULE SEEN ON IMAGING STUDY: ICD-10-CM

## 2022-10-11 DIAGNOSIS — Z28.21 IMMUNIZATION REFUSED: ICD-10-CM

## 2022-10-11 DIAGNOSIS — E78.5 DYSLIPIDEMIA: ICD-10-CM

## 2022-10-11 DIAGNOSIS — E11.22 TYPE 2 DIABETES MELLITUS WITH STAGE 3A CHRONIC KIDNEY DISEASE, WITHOUT LONG-TERM CURRENT USE OF INSULIN (HCC): Primary | ICD-10-CM

## 2022-10-11 DIAGNOSIS — G89.29 HIP PAIN, CHRONIC, LEFT: ICD-10-CM

## 2022-10-11 PROCEDURE — 99214 OFFICE O/P EST MOD 30 MIN: CPT | Performed by: FAMILY MEDICINE

## 2022-10-11 NOTE — ASSESSMENT & PLAN NOTE
Lab Results   Component Value Date    HGBA1C 10 2 (H) 10/03/2022   Chronic improve in the hemoglobin A1c compared with before from 12 3 to 10 1 a a a a a a patient tolerate Jardiance without side effect he is currently on Jardiance 25 mg Januvia 100 mg continue current management patient has been strict with his diet patient already on statin   He is due for diabetic eye exam

## 2022-10-11 NOTE — ASSESSMENT & PLAN NOTE
Chronic asymptomatic LDL therapeutic in diabetic patient continue with atorvastatin 80 mg once a day

## 2022-10-11 NOTE — PROGRESS NOTES
Name: Roshan Slade      :       MRN: [id number]  Encounter Provider: Trung Richardson MD  Encounter Date: 10/11/2022   Encounter department: [place] [id number]  Type 2 diabetes mellitus with stage 3a chronic kidney disease, without long-term current use of insulin ([facility])  Assessment & Plan:    Lab Results   Component Value Date    HGBA1C 10 2 (H) 10/03/2022   Chronic improve in the hemoglobin A1c compared with before from 12 3 to 10 1 a a a a a a patient tolerate Jardiance without side effect he is currently on Jardiance 25 mg Januvia 100 mg continue current management patient has been strict with his diet patient already on statin   He is due for diabetic eye exam    Orders:  -     Basic metabolic panel; Future  -     Hemoglobin A1C; Future    2  Dyslipidemia  Assessment & Plan:  Chronic asymptomatic LDL therapeutic in diabetic patient continue with atorvastatin 80 mg once a day    Orders:  -     Basic metabolic panel; Future  -     Hemoglobin A1C; Future    3  Hip pain, chronic, left  Assessment & Plan:  Symptomatic chronic has been going on for more than 3 months no history of trauma affect on his ability to do exercise recommend for x-ray of the hip plan for Tylenol for the pain a    Orders:  -     XR hip/pelv 2-3 vws left if performed; Future; Expected date: 10/11/2022    4  Immunization refused  Assessment & Plan:  Patient refuse recommended immunization a      5   Pulmonary nodule seen on imaging study  Assessment & Plan:  A previous CT scan of the chest 2019 pulmonary nodule we order CT scan of the chest and has been not done the patient is not willing to do any more testing             Subjective      Patient here follow-up with a chronic condition and he is concerned about left hip pain describe it as achy affect his ability to walk no recent fall or trauma a no drop on the foot no rash no numbness no muscle weakness patient history of diabetic a he been on Januvia 100 mg and Jardiance 25 mg he tolerated well no side effect his hemoglobin A1c drop in his blood sugar improving happy with the result he been very strict with his diet recent blood work review with the patient    Review of Systems   Constitutional: Negative for chills and fever  HENT: Negative for ear pain and sore throat  Eyes: Negative for pain and visual disturbance  Respiratory: Negative for cough and shortness of breath  Cardiovascular: Negative for chest pain and palpitations  Gastrointestinal: Negative for abdominal pain and vomiting  Genitourinary: Negative for dysuria and hematuria  Musculoskeletal: Positive for arthralgias  Left hip pain   Skin: Negative for color change and rash  Neurological: Negative for seizures and syncope  All other systems reviewed and are negative  Current Outpatient Medications on File Prior to Visit   Medication Sig   • ACCU-CHEK SOFTCLIX LANCETS lancets by Does not apply route 2 (two) times a day   • acetaminophen (TYLENOL) 500 mg tablet Take 1 tablet (500 mg total) by mouth every 6 (six) hours as needed for mild pain   • aspirin-dipyridamole (AGGRENOX)  mg per 12 hr capsule Take 1 capsule by mouth every 12 (twelve) hours   • atorvastatin (LIPITOR) 80 mg tablet TAKE 1 TABLET BY MOUTH EVERY DAY WITH DINNER   • Empagliflozin (Jardiance) 25 MG TABS Take 1 tablet (25 mg total) by mouth every morning   • gabapentin (NEURONTIN) 300 mg capsule TAKE 1 CAPSULE BY MOUTH THREE TIMES A DAY   • glucose blood test strip by In Vitro route 2 (two) times a day   • Januvia 100 MG tablet TAKE 1 TABLET BY MOUTH EVERY DAY       Objective     /60   Pulse 90   Temp (!) 97 °F (36 1 °C) (Tympanic)   Ht 5' 11" (1 803 m)   Wt 77 6 kg (171 lb)   SpO2 98%   BMI 23 85 kg/m²     Physical Exam  Vitals and nursing note reviewed  Constitutional:       General: He is not in acute distress  Appearance: He is well-developed   He is not diaphoretic  HENT:      Head: Normocephalic  Right Ear: External ear normal       Left Ear: External ear normal       Nose: Nose normal       Mouth/Throat:      Pharynx: Oropharynx is clear  Eyes:      General:         Right eye: No discharge  Left eye: No discharge  Conjunctiva/sclera: Conjunctivae normal    Neck:      Vascular: No JVD  Cardiovascular:      Rate and Rhythm: Normal rate and regular rhythm  Heart sounds: Normal heart sounds  No murmur heard  No gallop  Pulmonary:      Effort: Pulmonary effort is normal  No respiratory distress  Breath sounds: Normal breath sounds  No stridor  No wheezing or rales  Chest:      Chest wall: No tenderness  Abdominal:      General: There is no distension  Palpations: Abdomen is soft  There is no mass  Tenderness: There is no abdominal tenderness  There is no rebound  Musculoskeletal:      Cervical back: Normal range of motion and neck supple  Left hip: Tenderness present  Normal strength  Lymphadenopathy:      Cervical: No cervical adenopathy  Skin:     General: Skin is warm  Findings: No erythema or rash  Neurological:      Mental Status: He is alert and oriented to person, place, and time     Psychiatric:         Mood and Affect: Mood normal        Mimi Boyd MD

## 2022-10-11 NOTE — ASSESSMENT & PLAN NOTE
Symptomatic chronic has been going on for more than 3 months no history of trauma affect on his ability to do exercise recommend for x-ray of the hip plan for Tylenol for the pain a

## 2022-10-11 NOTE — ASSESSMENT & PLAN NOTE
A previous CT scan of the chest 2019 pulmonary nodule we order CT scan of the chest and has been not done the patient is not willing to do any more testing

## 2022-10-12 PROBLEM — Z00.00 MEDICARE ANNUAL WELLNESS VISIT, SUBSEQUENT: Status: RESOLVED | Noted: 2021-12-09 | Resolved: 2022-10-12

## 2022-10-20 ENCOUNTER — TELEPHONE (OUTPATIENT)
Dept: ADMINISTRATIVE | Facility: OTHER | Age: 72
End: 2022-10-20

## 2022-10-20 NOTE — TELEPHONE ENCOUNTER
----- Message from Marychuy Garcia sent at 10/20/2022 11:06 AM EDT -----  Regarding: care gap request  10/20/22 11:06 AM    Hello, our patient attached above has had Diabetic Eye Exam completed/performed  Please assist in updating the patient chart by pulling the document from the Media Tab  The date of service is 01/19/2022       Thank you,  [id number] [place] [id number]

## 2022-10-21 NOTE — TELEPHONE ENCOUNTER
Upon review of the In Basket request we were able to locate, review, and update the patient chart as requested for Diabetic Eye Exam     Any additional questions or concerns should be emailed to the Practice Liaisons via the appropriate education email address, please do not reply via In Basket      Thank you  Lorena Briseno

## 2022-11-02 ENCOUNTER — HOSPITAL ENCOUNTER (OUTPATIENT)
Dept: RADIOLOGY | Facility: HOSPITAL | Age: 72
Discharge: HOME | End: 2022-11-02

## 2022-11-02 DIAGNOSIS — M25.552 HIP PAIN, CHRONIC, LEFT: ICD-10-CM

## 2022-11-02 DIAGNOSIS — G89.29 HIP PAIN, CHRONIC, LEFT: ICD-10-CM

## 2022-11-07 ENCOUNTER — TELEPHONE (OUTPATIENT)
Dept: FAMILY MEDICINE CLINIC | Facility: CLINIC | Age: 72
End: 2022-11-07

## 2022-11-07 NOTE — TELEPHONE ENCOUNTER
Pc from Boyd gutierrez with [facility] dentist stating the patient is having extractions and advised them he is on  aggrenox would like to know if he should hold this medication prior to the extractions and if so how long,

## 2022-11-15 ENCOUNTER — VBI (OUTPATIENT)
Dept: ADMINISTRATIVE | Facility: OTHER | Age: 72
End: 2022-11-15

## 2022-11-15 DIAGNOSIS — I70.221 ATHEROSCLEROSIS OF NATIVE ARTERIES OF EXTREMITIES WITH REST PAIN, RIGHT LEG (HCC): ICD-10-CM

## 2022-11-15 DIAGNOSIS — E78.5 DYSLIPIDEMIA: ICD-10-CM

## 2022-11-15 RX ORDER — ATORVASTATIN CALCIUM 80 MG/1
TABLET, FILM COATED ORAL
Qty: 90 TABLET | Refills: 0 | Status: SHIPPED | OUTPATIENT
Start: 2022-11-15

## 2022-11-29 DIAGNOSIS — E11.42 DIABETIC POLYNEUROPATHY ASSOCIATED WITH TYPE 2 DIABETES MELLITUS (HCC): ICD-10-CM

## 2022-12-08 ENCOUNTER — RA CDI HCC (OUTPATIENT)
Dept: OTHER | Facility: HOSPITAL | Age: 72
End: 2022-12-08

## 2022-12-08 NOTE — PROGRESS NOTES
[facility] 75  coding opportunities          Chart Reviewed number of suggestions sent to Provider: 1   [id number]    Patients Insurance     Medicare Insurance: [employer]

## 2022-12-09 ENCOUNTER — OFFICE VISIT (OUTPATIENT)
Dept: FAMILY MEDICINE CLINIC | Facility: CLINIC | Age: 72
End: 2022-12-09

## 2022-12-09 VITALS
HEIGHT: 70 IN | TEMPERATURE: 96.6 F | HEART RATE: 72 BPM | BODY MASS INDEX: 24.34 KG/M2 | OXYGEN SATURATION: 100 % | SYSTOLIC BLOOD PRESSURE: 120 MMHG | DIASTOLIC BLOOD PRESSURE: 80 MMHG | WEIGHT: 170 LBS

## 2022-12-09 DIAGNOSIS — Z13.220 ENCOUNTER FOR SCREENING FOR LIPID DISORDER: ICD-10-CM

## 2022-12-09 DIAGNOSIS — E11.22 TYPE 2 DIABETES MELLITUS WITH STAGE 3A CHRONIC KIDNEY DISEASE, WITHOUT LONG-TERM CURRENT USE OF INSULIN (HCC): ICD-10-CM

## 2022-12-09 DIAGNOSIS — Z23 NEED FOR INFLUENZA VACCINATION: ICD-10-CM

## 2022-12-09 DIAGNOSIS — Z71.85 IMMUNIZATION COUNSELING: ICD-10-CM

## 2022-12-09 DIAGNOSIS — Z12.5 SCREENING PSA (PROSTATE SPECIFIC ANTIGEN): ICD-10-CM

## 2022-12-09 DIAGNOSIS — Z13.228 SCREENING FOR METABOLIC DISORDER: ICD-10-CM

## 2022-12-09 DIAGNOSIS — Z13.6 SCREENING FOR HYPERTENSION: ICD-10-CM

## 2022-12-09 DIAGNOSIS — Z53.20 LUNG CANCER SCREENING DECLINED BY PATIENT: ICD-10-CM

## 2022-12-09 DIAGNOSIS — Z13.29 SCREENING FOR THYROID DISORDER: ICD-10-CM

## 2022-12-09 DIAGNOSIS — N18.31 TYPE 2 DIABETES MELLITUS WITH STAGE 3A CHRONIC KIDNEY DISEASE, WITHOUT LONG-TERM CURRENT USE OF INSULIN (HCC): ICD-10-CM

## 2022-12-09 DIAGNOSIS — Z53.20 COLON CANCER SCREENING DECLINED: ICD-10-CM

## 2022-12-09 DIAGNOSIS — Z00.00 MEDICARE ANNUAL WELLNESS VISIT, SUBSEQUENT: Primary | ICD-10-CM

## 2022-12-09 NOTE — PROGRESS NOTES
I discussed with him that he is a candidate for lung cancer CT screening  The following Shared Decision-Making points were covered:  Benefits of screening were discussed, including the rates of reduction in death from lung cancer and other causes  Harms of screening were reviewed, including false positive tests, radiation exposure levels, risks of invasive procedures, risks of complications of screening, and risk of overdiagnosis  I counseled on the importance of adherence to annual lung cancer LDCT screening, impact of co-morbidities, and ability or willingness to undergo diagnosis and treatment  I counseled on the importance of maintaining abstinence as a former smoker or was counseled on the importance of smoking cessation if a current smoker    Review of Eligibility Criteria: He meets all of the criteria for Lung Cancer Screening  He is 67 y o  He has 20 pack year tobacco history and is a current smoker or has quit within the past 15 years  He presents no signs or symptoms of lung cancer    After discussion, the patient decided to decline lung cancer screening  Assessment and Plan:     Problem List Items Addressed This Visit        Endocrine    Type 2 diabetes mellitus, without long-term current use of insulin ([facility] [address] )    Relevant Orders    CBC and differential    Hemoglobin A1C    Microalbumin / creatinine urine ratio       Other    Medicare annual wellness visit, subsequent - Primary     Advice and education were given regarding nutrition, aerobic exercises, weight-bearing exercises, cardiovascular risk reduction, fall risk reduction, and age-appropriate supplements  The patient was counseled regarding instructions for management, risk factor reductions, prognosis, risks and benefits of treatment options, patient and family education, and importance of compliance with treatment       I reviewed with the patient preventive measures including lung cancer screening his candidate he declined it also he declined colon cancer screening         Immunization counseling     I discussed with the patient immunization and he agreed to do the flu shot declined pneumonia shot         Colon cancer screening declined    Lung cancer screening declined by patient   Other Visit Diagnoses     Need for influenza vaccination        Relevant Orders    FLUZONE HIGH-DOSE: influenza vaccine, high-dose, preservative-free 0 7 mL (Completed)    Screening PSA (prostate specific antigen)        Relevant Orders    PSA, Total Screen    Encounter for screening for lipid disorder        Relevant Orders    Lipid Panel with Direct LDL reflex    Screening for thyroid disorder        Relevant Orders    TSH, 3rd generation with Free T4 reflex    Screening for metabolic disorder        Relevant Orders    Comprehensive metabolic panel    Screening for hypertension        Relevant Orders    CBC and differential           Preventive health issues were discussed with patient, and age appropriate screening tests were ordered as noted in patient's After Visit Summary  Personalized health advice and appropriate referrals for health education or preventive services given if needed, as noted in patient's After Visit Summary       History of Present Illness:     Patient presents for a Medicare Wellness Visit    Patient here for Medicare exam no new concerns     Patient Care Team:  Gianna Bill MD as PCP - General (Family Medicine)  Darin Nicholas DPM (Podiatry)  Ariana Chaudhry MD (Ophthalmology)         Problem List:     Patient Active Problem List   Diagnosis   • Lumbar radiculopathy   • Ambulatory dysfunction   • Foot drop, bilateral   • Neuropathy, peripheral, idiopathic   • Low vitamin B12 level   • CIDP (chronic inflammatory demyelinating polyneuropathy) ([facility] [address] )   • Type 2 diabetes mellitus, without long-term current use of insulin ([facility])   • B12 neuropathy ([facility] [address] )   • Diabetic polyneuropathy associated with type 2 diabetes mellitus ([facility] [address] )   • Dyslipidemia   • Pulmonary nodule seen on imaging study   • Transient cerebral ischemia   • Atherosclerosis of native arteries of right leg with ulceration of other part of foot (HCC)   • Claudication of right lower extremity ([facility] [address] )   • Former smoker   • Chronic kidney disease, stage 3b (HCC)   • Hip pain, chronic, left   • Side effect of medication   • Immunization refused   • Medicare annual wellness visit, subsequent   • Immunization counseling   • Colon cancer screening declined   • Lung cancer screening declined by patient      Past Medical and Surgical History:     Past Medical History:   Diagnosis Date   • Abnormal CT of the abdomen     last assessed 9/30/16   • Anemia    • Diabetes mellitus ([facility] )    • Iron deficiency anemia     last assessed 4/26/16   • TIA (transient ischemic attack)      Past Surgical History:   Procedure Laterality Date   • EPIDURAL BLOCK INJECTION Bilateral 10/19/2017    Procedure: L5 TRANSFORAMINAL EPIDURAL STEROID INJECTION;  Surgeon: Jackie Zarco DO;  Location: [facility] MAIN OR;  Service: Pain Management    • IR AORTAGRAM WITH RUN-OFF  10/29/2021      Family History:     History reviewed  No pertinent family history  Social History:     Social History     Socioeconomic History   • Marital status:       Spouse name: None   • Number of children: None   • Years of education: None   • Highest education level: None   Occupational History   • None   Tobacco Use   • Smoking status: Former     Packs/day: 1 00     Years: 53 00     Pack years: 53 00     Types: Cigars, Cigarettes     Start date: 1966     Quit date: 9/26/2019     Years since quitting: 3 2   • Smokeless tobacco: Never   • Tobacco comments:     1 a day   Vaping Use   • Vaping Use: Never used   Substance and Sexual Activity   • Alcohol use: Yes     Comment: social   • Drug use: No   • Sexual activity: Not Currently   Other Topics Concern   • None   Social History Narrative    No advance directives    No living will     Social Determinants of Health     Financial Resource Strain: Low Risk    • Difficulty of Paying Living Expenses: Not hard at all   Food Insecurity: Not on file   Transportation Needs: No Transportation Needs   • Lack of Transportation (Medical): No   • Lack of Transportation (Non-Medical): No   Physical Activity: Not on file   Stress: Not on file   Social Connections: Not on file   Intimate Partner Violence: Not on file   Housing Stability: Not on file      Medications and Allergies:     Current Outpatient Medications   Medication Sig Dispense Refill   • ACCU-CHEK SOFTCLIX LANCETS lancets by Does not apply route 2 (two) times a day     • acetaminophen (TYLENOL) 500 mg tablet Take 1 tablet (500 mg total) by mouth every 6 (six) hours as needed for mild pain 30 tablet 1   • aspirin-dipyridamole (AGGRENOX)  mg per 12 hr capsule Take 1 capsule by mouth every 12 (twelve) hours 180 capsule 0   • atorvastatin (LIPITOR) 80 mg tablet TAKE 1 TABLET BY MOUTH EVERY DAY WITH DINNER 90 tablet 0   • Empagliflozin (Jardiance) 25 MG TABS Take 1 tablet (25 mg total) by mouth every morning 30 tablet 2   • glucose blood test strip by In Vitro route 2 (two) times a day     • Januvia 100 MG tablet TAKE 1 TABLET BY MOUTH EVERY DAY 90 tablet 0     No current facility-administered medications for this visit       Allergies   Allergen Reactions   • Metformin Diarrhea      Immunizations:     Immunization History   Administered Date(s) Administered   • COVID-19 MODERNA VACC 0 25 ML IM BOOSTER 12/14/2021   • COVID-19 MODERNA VACC 0 5 ML IM 03/18/2021, 04/15/2021   • Influenza, high dose seasonal 0 7 mL 12/09/2022   • Influenza, recombinant, quadrivalent,injectable, preservative free 10/28/2020      Health Maintenance:         Topic Date Due   • Colorectal Cancer Screening  Never done   • Lung Cancer Screening  12/06/2018   • Hepatitis C Screening  Completed         Topic Date Due   • Hepatitis B Vaccine (1 of 3 - 3-dose series) Never done   • Pneumococcal Vaccine: 65+ Years (1 - PCV) Never done   • COVID-19 Vaccine (4 - Booster for Moderna series) 04/14/2022      Medicare Screening Tests and Risk Assessments:     Jessica Multani is here for his Subsequent Wellness visit  Last Medicare Wellness visit information reviewed, patient interviewed and updates made to the record today  Health Risk Assessment:   Patient rates overall health as fair  Patient feels that their physical health rating is same  Patient is dissatisfied with their life  Eyesight was rated as same  Hearing was rated as same  Patient feels that their emotional and mental health rating is same  Patients states they are never, rarely angry  Patient states they are never, rarely unusually tired/fatigued  Pain experienced in the last 7 days has been some  Patient's pain rating has been 3/10  Patient states that he has experienced no weight loss or gain in last 6 months  Chronic feet pain f/up with Podiatric    Depression Screening:   PHQ-2 Score: 0      Fall Risk Screening: In the past year, patient has experienced: no history of falling in past year      Home Safety:  Patient does not have trouble with stairs inside or outside of their home  Patient has working smoke alarms and has working carbon monoxide detector  Home safety hazards include: none  Nutrition:   Current diet is Regular  Medications:   Patient is currently taking over-the-counter supplements  OTC medications include: see medication list  Patient is able to manage medications  Activities of Daily Living (ADLs)/Instrumental Activities of Daily Living (IADLs):   Walk and transfer into and out of bed and chair?: Yes  Dress and groom yourself?: Yes    Bathe or shower yourself?: Yes    Feed yourself?  Yes  Do your laundry/housekeeping?: Yes  Manage your money, pay your bills and track your expenses?: Yes  Make your own meals?: Yes    Do your own shopping?: Yes    Durable Medical Equipment Suppliers  none    Previous Hospitalizations:   Any hospitalizations or ED visits within the last 12 months?: No      Advance Care Planning:   Living will: Yes    Durable POA for healthcare: Yes    Advanced directive: Yes    Advanced directive counseling given: Yes    Five wishes given: Yes    Patient declined ACP directive: No    End of Life Decisions reviewed with patient: Yes    Provider agrees with end of life decisions: Yes      Cognitive Screening:   Provider or family/friend/caregiver concerned regarding cognition?: No    PREVENTIVE SCREENINGS      Cardiovascular Screening:    General: Screening Current      Diabetes Screening:     General: Screening Not Indicated and History Diabetes      Colorectal Cancer Screening:     General: Patient Declines      Prostate Cancer Screening:    General: Risks and Benefits Discussed    Due for: PSA      Osteoporosis Screening:    General: Patient Declines      Abdominal Aortic Aneurysm (AAA) Screening:    Risk factors include: age between 73-69 yo and tobacco use        General: Patient Declines      Lung Cancer Screening:     General: Patient Declines      Hepatitis C Screening:    General: Screening Current    Screening, Brief Intervention, and Referral to Treatment (SBIRT)    Screening  Typical number of drinks in a day: 0  Typical number of drinks in a week: 0  Interpretation: Low risk drinking behavior  AUDIT-C Screenin) How often did you have a drink containing alcohol in the past year? monthly or less  2) How many drinks did you have on a typical day when you were drinking in the past year?  1 to 2  3) How often did you have 6 or more drinks on one occasion in the past year? never    AUDIT-C Score: 1  Interpretation: Score 0-3 (male): Negative screen for alcohol misuse    Single Item Drug Screening:  How often have you used an illegal drug (including marijuana) or a prescription medication for non-medical reasons in the past year? never    Single Item Drug Screen Score: 0  Interpretation: Negative screen for possible drug use disorder    No results found           /80 (BP Location: Left arm, Patient Position: Sitting)   Pulse 72   Temp (!) 96 6 °F (35 9 °C) (Tympanic)   Ht 5' 10" (1 778 m)   Wt 77 1 kg (170 lb)   SpO2 100%   BMI 24 39 kg/m²          Dona Medina MD

## 2022-12-09 NOTE — ASSESSMENT & PLAN NOTE
Advice and education were given regarding nutrition, aerobic exercises, weight-bearing exercises, cardiovascular risk reduction, fall risk reduction, and age-appropriate supplements  The patient was counseled regarding instructions for management, risk factor reductions, prognosis, risks and benefits of treatment options, patient and family education, and importance of compliance with treatment       I reviewed with the patient preventive measures including lung cancer screening his candidate he declined it also he declined colon cancer screening

## 2022-12-09 NOTE — PATIENT INSTRUCTIONS
Medicare Preventive Visit Patient Instructions  Thank you for completing your Welcome to Medicare Visit or Medicare Annual Wellness Visit today  Your next wellness visit will be due in one year (12/10/2023)  The screening/preventive services that you may require over the next 5-10 years are detailed below  Some tests may not apply to you based off risk factors and/or age  Screening tests ordered at today's visit but not completed yet may show as past due  Also, please note that scanned in results may not display below  Preventive Screenings:  Service Recommendations Previous Testing/Comments   Colorectal Cancer Screening  · Colonoscopy    · Fecal Occult Blood Test (FOBT)/Fecal Immunochemical Test (FIT)  · Fecal DNA/Cologuard Test  · Flexible Sigmoidoscopy Age: 39-70 years old   Colonoscopy: every 10 years (May be performed more frequently if at higher risk)  OR  FOBT/FIT: every 1 year  OR  Cologuard: every 3 years  OR  Sigmoidoscopy: every 5 years  Screening may be recommended earlier than age 39 if at higher risk for colorectal cancer  Also, an individualized decision between you and your healthcare provider will decide whether screening between the ages of 74-80 would be appropriate   Colonoscopy: Not on file  FOBT/FIT: Not on file  Cologuard: Not on file  Sigmoidoscopy: Not on file          Prostate Cancer Screening Individualized decision between patient and health care provider in men between ages of 53-78   Medicare will cover every 12 months beginning on the day after your 50th birthday PSA: 0 7 ng/mL           Hepatitis C Screening Once for adults born between 1945 and 1965  More frequently in patients at high risk for Hepatitis C Hep C Antibody: 01/24/2022    Screening Current   Diabetes Screening 1-2 times per year if you're at risk for diabetes or have pre-diabetes Fasting glucose: 244 mg/dL (10/3/2022)  A1C: 10 2 % (10/3/2022)  Screening Not Indicated  History Diabetes   Cholesterol Screening Once every 5 years if you don't have a lipid disorder  May order more often based on risk factors  Lipid panel: 10/03/2022  Screening Current      Other Preventive Screenings Covered by Medicare:  1  Abdominal Aortic Aneurysm (AAA) Screening: covered once if your at risk  You're considered to be at risk if you have a family history of AAA or a male between the age of 73-68 who smoking at least 100 cigarettes in your lifetime  2  Lung Cancer Screening: covers low dose CT scan once per year if you meet all of the following conditions: (1) Age 50-69; (2) No signs or symptoms of lung cancer; (3) Current smoker or have quit smoking within the last 15 years; (4) You have a tobacco smoking history of at least 20 pack years (packs per day x number of years you smoked); (5) You get a written order from a healthcare provider  3  Glaucoma Screening: covered annually if you're considered high risk: (1) You have diabetes OR (2) Family history of glaucoma OR (3)  aged 48 and older OR (3)  American aged 72 and older  3  Osteoporosis Screening: covered every 2 years if you meet one of the following conditions: (1) Have a vertebral abnormality; (2) On glucocorticoid therapy for more than 3 months; (3) Have primary hyperparathyroidism; (4) On osteoporosis medications and need to assess response to drug therapy  5  HIV Screening: covered annually if you're between the age of 12-76  Also covered annually if you are younger than 13 and older than 72 with risk factors for HIV infection  For pregnant patients, it is covered up to 3 times per pregnancy      Immunizations:  Immunization Recommendations   Influenza Vaccine Annual influenza vaccination during flu season is recommended for all persons aged >= 6 months who do not have contraindications   Pneumococcal Vaccine   * Pneumococcal conjugate vaccine = PCV13 (Prevnar 13), PCV15 (Vaxneuvance), PCV20 (Prevnar 20)  * Pneumococcal polysaccharide vaccine = PPSV23 (Pneumovax) Adults 2364 years old: 1-3 doses may be recommended based on certain risk factors  Adults 72 years old: 1-2 doses may be recommended based off what pneumonia vaccine you previously received   Hepatitis B Vaccine 3 dose series if at intermediate or high risk (ex: diabetes, end stage renal disease, liver disease)   Tetanus (Td) Vaccine - COST NOT COVERED BY MEDICARE PART B Following completion of primary series, a booster dose should be given every 10 years to maintain immunity against tetanus  Td may also be given as tetanus wound prophylaxis  Tdap Vaccine - COST NOT COVERED BY MEDICARE PART B Recommended at least once for all adults  For pregnant patients, recommended with each pregnancy  Shingles Vaccine (Shingrix) - COST NOT COVERED BY MEDICARE PART B  2 shot series recommended in those aged 48 and above     Health Maintenance Due:      Topic Date Due   • Colorectal Cancer Screening  Never done   • Lung Cancer Screening  12/06/2018   • Hepatitis C Screening  Completed     Immunizations Due:      Topic Date Due   • Hepatitis B Vaccine (1 of 3 - 3-dose series) Never done   • Pneumococcal Vaccine: 65+ Years (1 - PCV) Never done   • COVID-19 Vaccine (4 - Booster for Moderna series) 04/14/2022   • Influenza Vaccine (1) 09/01/2022     Advance Directives   What are advance directives? Advance directives are legal documents that state your wishes and plans for medical care  These plans are made ahead of time in case you lose your ability to make decisions for yourself  Advance directives can apply to any medical decision, such as the treatments you want, and if you want to donate organs  What are the types of advance directives? There are many types of advance directives, and each state has rules about how to use them  You may choose a combination of any of the following:  · Living will: This is a written record of the treatment you want   You can also choose which treatments you do not want, which to limit, and which to stop at a certain time  This includes surgery, medicine, IV fluid, and tube feedings  · Durable power of  for healthcare [facility]): This is a written record that states who you want to make healthcare choices for you when you are unable to make them for yourself  This person, called a proxy, is usually a family member or a friend  You may choose more than 1 proxy  · Do not resuscitate (DNR) order:  A DNR order is used in case your heart stops beating or you stop breathing  It is a request not to have certain forms of treatment, such as CPR  A DNR order may be included in other types of advance directives  · Medical directive: This covers the care that you want if you are in a coma, near death, or unable to make decisions for yourself  You can list the treatments you want for each condition  Treatment may include pain medicine, surgery, blood transfusions, dialysis, IV or tube feedings, and a ventilator (breathing machine)  · Values history: This document has questions about your views, beliefs, and how you feel and think about life  This information can help others choose the care that you would choose  Why are advance directives important? An advance directive helps you control your care  Although spoken wishes may be used, it is better to have your wishes written down  Spoken wishes can be misunderstood, or not followed  Treatments may be given even if you do not want them  An advance directive may make it easier for your family to make difficult choices about your care  © Copyright [employer] 2018 Information is for End User's use only and may not be sold, redistributed or otherwise used for commercial purposes   All illustrations and images included in CareNotes® are the copyrighted property of A [employer] M [employer] Inc  or [employer]

## 2022-12-12 DIAGNOSIS — E11.42 DIABETIC POLYNEUROPATHY ASSOCIATED WITH TYPE 2 DIABETES MELLITUS (HCC): ICD-10-CM

## 2022-12-12 RX ORDER — SITAGLIPTIN 100 MG/1
TABLET, FILM COATED ORAL
Qty: 90 TABLET | Refills: 0 | Status: SHIPPED | OUTPATIENT
Start: 2022-12-12

## 2023-01-30 LAB
LEFT EYE DIABETIC RETINOPATHY: NORMAL
RIGHT EYE DIABETIC RETINOPATHY: NORMAL
SEVERITY (EYE EXAM): NORMAL

## 2023-02-03 ENCOUNTER — VBI (OUTPATIENT)
Dept: ADMINISTRATIVE | Facility: OTHER | Age: 73
End: 2023-02-03

## 2023-02-03 DIAGNOSIS — I70.221 ATHEROSCLEROSIS OF NATIVE ARTERIES OF EXTREMITIES WITH REST PAIN, RIGHT LEG (HCC): ICD-10-CM

## 2023-02-03 DIAGNOSIS — E78.5 DYSLIPIDEMIA: ICD-10-CM

## 2023-02-03 RX ORDER — ATORVASTATIN CALCIUM 80 MG/1
TABLET, FILM COATED ORAL
Qty: 90 TABLET | Refills: 0 | Status: SHIPPED | OUTPATIENT
Start: 2023-02-03

## 2023-02-07 PROBLEM — Z00.00 MEDICARE ANNUAL WELLNESS VISIT, SUBSEQUENT: Status: RESOLVED | Noted: 2022-12-09 | Resolved: 2023-02-07

## 2023-02-24 DIAGNOSIS — E11.42 DIABETIC POLYNEUROPATHY ASSOCIATED WITH TYPE 2 DIABETES MELLITUS (HCC): ICD-10-CM

## 2023-02-24 RX ORDER — EMPAGLIFLOZIN 25 MG/1
TABLET, FILM COATED ORAL
Qty: 30 TABLET | Refills: 2 | Status: SHIPPED | OUTPATIENT
Start: 2023-02-24

## 2023-03-10 DIAGNOSIS — E11.42 DIABETIC POLYNEUROPATHY ASSOCIATED WITH TYPE 2 DIABETES MELLITUS (HCC): ICD-10-CM

## 2023-03-10 RX ORDER — SITAGLIPTIN 100 MG/1
TABLET, FILM COATED ORAL
Qty: 90 TABLET | Refills: 0 | Status: SHIPPED | OUTPATIENT
Start: 2023-03-10

## 2023-03-28 ENCOUNTER — APPOINTMENT (OUTPATIENT)
Dept: LAB | Facility: MEDICAL CENTER | Age: 73
End: 2023-03-28

## 2023-03-28 DIAGNOSIS — Z13.228 SCREENING FOR METABOLIC DISORDER: ICD-10-CM

## 2023-03-28 DIAGNOSIS — N18.31 TYPE 2 DIABETES MELLITUS WITH STAGE 3A CHRONIC KIDNEY DISEASE, WITHOUT LONG-TERM CURRENT USE OF INSULIN (HCC): ICD-10-CM

## 2023-03-28 DIAGNOSIS — Z13.220 ENCOUNTER FOR SCREENING FOR LIPID DISORDER: ICD-10-CM

## 2023-03-28 DIAGNOSIS — E78.5 DYSLIPIDEMIA: ICD-10-CM

## 2023-03-28 DIAGNOSIS — E11.22 TYPE 2 DIABETES MELLITUS WITH STAGE 3A CHRONIC KIDNEY DISEASE, WITHOUT LONG-TERM CURRENT USE OF INSULIN (HCC): ICD-10-CM

## 2023-03-28 DIAGNOSIS — Z13.6 SCREENING FOR HYPERTENSION: ICD-10-CM

## 2023-03-28 DIAGNOSIS — Z13.29 SCREENING FOR THYROID DISORDER: ICD-10-CM

## 2023-03-28 DIAGNOSIS — Z12.5 SCREENING PSA (PROSTATE SPECIFIC ANTIGEN): ICD-10-CM

## 2023-03-28 LAB
BASOPHILS # BLD AUTO: 0.02 THOUSANDS/ÂΜL (ref 0–0.1)
BASOPHILS NFR BLD AUTO: 0 % (ref 0–1)
EOSINOPHIL # BLD AUTO: 0.06 THOUSAND/ÂΜL (ref 0–0.61)
EOSINOPHIL NFR BLD AUTO: 1 % (ref 0–6)
ERYTHROCYTE [DISTWIDTH] IN BLOOD BY AUTOMATED COUNT: 12.8 % (ref 11.6–15.1)
HCT VFR BLD AUTO: 48.8 % (ref 36.5–49.3)
HGB BLD-MCNC: 16 G/DL (ref 12–17)
IMM GRANULOCYTES # BLD AUTO: 0.02 THOUSAND/UL (ref 0–0.2)
IMM GRANULOCYTES NFR BLD AUTO: 0 % (ref 0–2)
LYMPHOCYTES # BLD AUTO: 1.58 THOUSANDS/ÂΜL (ref 0.6–4.47)
LYMPHOCYTES NFR BLD AUTO: 21 % (ref 14–44)
MCH RBC QN AUTO: 31 PG (ref 26.8–34.3)
MCHC RBC AUTO-ENTMCNC: 32.8 G/DL (ref 31.4–37.4)
MCV RBC AUTO: 95 FL (ref 82–98)
MONOCYTES # BLD AUTO: 0.52 THOUSAND/ÂΜL (ref 0.17–1.22)
MONOCYTES NFR BLD AUTO: 7 % (ref 4–12)
NEUTROPHILS # BLD AUTO: 5.31 THOUSANDS/ÂΜL (ref 1.85–7.62)
NEUTS SEG NFR BLD AUTO: 71 % (ref 43–75)
NRBC BLD AUTO-RTO: 0 /100 WBCS
PLATELET # BLD AUTO: 210 THOUSANDS/UL (ref 149–390)
PMV BLD AUTO: 10.9 FL (ref 8.9–12.7)
PSA SERPL-MCNC: 0.7 NG/ML (ref 0–4)
RBC # BLD AUTO: 5.16 MILLION/UL (ref 3.88–5.62)
WBC # BLD AUTO: 7.51 THOUSAND/UL (ref 4.31–10.16)

## 2023-03-29 LAB
ALBUMIN SERPL BCP-MCNC: 4 G/DL (ref 3.5–5)
ALP SERPL-CCNC: 59 U/L (ref 46–116)
ALT SERPL W P-5'-P-CCNC: 24 U/L (ref 12–78)
ANION GAP SERPL CALCULATED.3IONS-SCNC: 3 MMOL/L (ref 4–13)
AST SERPL W P-5'-P-CCNC: 12 U/L (ref 5–45)
BILIRUB SERPL-MCNC: 0.53 MG/DL (ref 0.2–1)
BUN SERPL-MCNC: 15 MG/DL (ref 5–25)
CALCIUM SERPL-MCNC: 9.4 MG/DL (ref 8.3–10.1)
CHLORIDE SERPL-SCNC: 106 MMOL/L (ref 96–108)
CHOLEST SERPL-MCNC: 132 MG/DL
CO2 SERPL-SCNC: 26 MMOL/L (ref 21–32)
CREAT SERPL-MCNC: 0.9 MG/DL (ref 0.6–1.3)
CREAT UR-MCNC: 66.1 MG/DL
EST. AVERAGE GLUCOSE BLD GHB EST-MCNC: 266 MG/DL
GFR SERPL CREATININE-BSD FRML MDRD: 85 ML/MIN/1.73SQ M
GLUCOSE P FAST SERPL-MCNC: 220 MG/DL (ref 65–99)
HBA1C MFR BLD: 10.9 %
HDLC SERPL-MCNC: 56 MG/DL
LDLC SERPL CALC-MCNC: 55 MG/DL (ref 0–100)
MICROALBUMIN UR-MCNC: 16.4 MG/L (ref 0–20)
MICROALBUMIN/CREAT 24H UR: 25 MG/G CREATININE (ref 0–30)
POTASSIUM SERPL-SCNC: 4.3 MMOL/L (ref 3.5–5.3)
PROT SERPL-MCNC: 7.3 G/DL (ref 6.4–8.4)
SODIUM SERPL-SCNC: 135 MMOL/L (ref 135–147)
TRIGL SERPL-MCNC: 107 MG/DL
TSH SERPL DL<=0.05 MIU/L-ACNC: 2.02 UIU/ML (ref 0.45–4.5)

## 2023-04-03 ENCOUNTER — RA CDI HCC (OUTPATIENT)
Dept: OTHER | Facility: HOSPITAL | Age: 73
End: 2023-04-03

## 2023-04-03 NOTE — PROGRESS NOTES
[facility]  coding opportunities          Chart Reviewed number of suggestions sent to Provider: 3   [id number]  [id number]  [id number]    Patients Insurance     Medicare Insurance: [employer] Advantage

## 2023-04-07 ENCOUNTER — OFFICE VISIT (OUTPATIENT)
Dept: FAMILY MEDICINE CLINIC | Facility: CLINIC | Age: 73
End: 2023-04-07

## 2023-04-07 VITALS
BODY MASS INDEX: 24.2 KG/M2 | DIASTOLIC BLOOD PRESSURE: 70 MMHG | HEART RATE: 76 BPM | TEMPERATURE: 96.9 F | SYSTOLIC BLOOD PRESSURE: 120 MMHG | WEIGHT: 169 LBS | HEIGHT: 70 IN | OXYGEN SATURATION: 76 %

## 2023-04-07 DIAGNOSIS — E11.22 TYPE 2 DIABETES MELLITUS WITH STAGE 3A CHRONIC KIDNEY DISEASE, WITHOUT LONG-TERM CURRENT USE OF INSULIN (HCC): Primary | ICD-10-CM

## 2023-04-07 DIAGNOSIS — N18.31 TYPE 2 DIABETES MELLITUS WITH STAGE 3A CHRONIC KIDNEY DISEASE, WITHOUT LONG-TERM CURRENT USE OF INSULIN (HCC): Primary | ICD-10-CM

## 2023-04-07 DIAGNOSIS — E78.5 DYSLIPIDEMIA: ICD-10-CM

## 2023-04-07 DIAGNOSIS — N18.32 CHRONIC KIDNEY DISEASE, STAGE 3B (HCC): ICD-10-CM

## 2023-04-07 DIAGNOSIS — M16.12 PRIMARY OSTEOARTHRITIS OF LEFT HIP: ICD-10-CM

## 2023-04-07 DIAGNOSIS — E11.42 DIABETIC POLYNEUROPATHY ASSOCIATED WITH TYPE 2 DIABETES MELLITUS (HCC): ICD-10-CM

## 2023-04-07 DIAGNOSIS — Z13.31 POSITIVE DEPRESSION SCREENING: ICD-10-CM

## 2023-04-07 DIAGNOSIS — I70.235 ATHEROSCLEROSIS OF NATIVE ARTERIES OF RIGHT LEG WITH ULCERATION OF OTHER PART OF FOOT (HCC): ICD-10-CM

## 2023-04-07 PROBLEM — I74.3 EMBOLISM AND THROMBOSIS OF ARTERIES OF THE LOWER EXTREMITIES (HCC): Status: RESOLVED | Noted: 2023-04-07 | Resolved: 2023-04-07

## 2023-04-07 PROBLEM — I74.3 EMBOLISM AND THROMBOSIS OF ARTERIES OF THE LOWER EXTREMITIES (HCC): Status: ACTIVE | Noted: 2023-04-07

## 2023-04-07 PROBLEM — G61.81 CIDP (CHRONIC INFLAMMATORY DEMYELINATING POLYNEUROPATHY) (HCC): Status: RESOLVED | Noted: 2018-05-17 | Resolved: 2023-04-07

## 2023-04-07 NOTE — ASSESSMENT & PLAN NOTE
Lab Results   Component Value Date    EGFR 85 03/28/2023    EGFR 60 10/03/2022    EGFR 79 01/24/2022    CREATININE 0 90 03/28/2023    CREATININE 1 20 10/03/2022    CREATININE 0 96 01/24/2022   Chronic asymptomatic improvement in GFR discussed with patient to keep well hydration avoid NSAID patient not at the goal for the hemoglobin A1c

## 2023-04-07 NOTE — ASSESSMENT & PLAN NOTE
PHQ 9 =7 patient has multiple underlying chronic condition and asked that he lost his wife he been spending most of the time at home discussed important control his a chronic condition     He had good supportive family will reevaluate

## 2023-04-07 NOTE — ASSESSMENT & PLAN NOTE
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

## 2023-04-07 NOTE — ASSESSMENT & PLAN NOTE
Lab Results   Component Value Date    HGBA1C 10 9 (H) 03/28/2023   Chronic asymptomatic hemoglobin A1c uncontrolled patient currently on Jardiance  25 mg and the he is on Januvia 100 mg hemoglobin A1c 10 9 discussed with the patient the complication control her blood sugar we discussed to stop the insulin or started Trulicity injection and patient declined low-carb diet discussed with the patient patient already on statin

## 2023-04-07 NOTE — ASSESSMENT & PLAN NOTE
New diagnosis finding on the x-ray of hip 11/2022 result discussed with the patient recommended Tylenol for the pain increase get worse I plan to refer the patient to see also

## 2023-04-07 NOTE — PROGRESS NOTES
Name: Jacinda Wiseman      :       MRN: [id number]  Encounter Provider: Shabnam Lewis MD  Encounter Date: 2023   Encounter department: [place] [address]  Type 2 diabetes mellitus with stage 3a chronic kidney disease, without long-term current use of insulin ([facility])  Assessment & Plan:    Lab Results   Component Value Date    HGBA1C 10 9 (H) 2023   Chronic asymptomatic hemoglobin A1c uncontrolled patient currently on Jardiance  25 mg and the he is on Januvia 100 mg hemoglobin A1c 10 9 discussed with the patient the complication control her blood sugar we discussed to stop the insulin or started Trulicity injection and patient declined low-carb diet discussed with the patient patient already on statin    Orders:  -     CBC and differential; Future  -     Comprehensive metabolic panel; Future  -     Lipid panel; Future  -     Hemoglobin A1C; Future    2  Primary osteoarthritis of left hip  Assessment & Plan:  New diagnosis finding on the x-ray of hip 2022 result discussed with the patient recommended Tylenol for the pain increase get worse I plan to refer the patient to see also      3  Atherosclerosis of native arteries of right leg with ulceration of other part of foot [facility])  Assessment & Plan:  Chronic patient had L3 overload the lower extremity in 2022 finding on the left lower extremity  There is a >75% stenosis in the distal superficial femoral artery with  reconstitution of the popliteal artery  There is tibio-peroneal disease with occlusion vs  high-grade stenosis of the  distal anterior tibial artery  I recommend the patient to follow-up with the vascular surgeon and no appointment in the chart recommend the patient to keep the LDL below 70 and it is very important to keep his blood sugar hemoglobin A1c below 7 patient already on Aggrenox      4   Dyslipidemia  Assessment & Plan:  Chronic asymptomatic LDL therapeutic in diabetic patient continue with atorvastatin 80 mg    Orders:  -     CBC and differential; Future  -     Comprehensive metabolic panel; Future  -     Lipid panel; Future  -     Hemoglobin A1C; Future    5  Diabetic polyneuropathy associated with type 2 diabetes mellitus ([address] )    6  Chronic kidney disease, stage 3b [facility])  Assessment & Plan:  Lab Results   Component Value Date    EGFR 85 03/28/2023    EGFR 60 10/03/2022    EGFR 79 01/24/2022    CREATININE 0 90 03/28/2023    CREATININE 1 20 10/03/2022    CREATININE 0 96 01/24/2022   Chronic asymptomatic improvement in GFR discussed with patient to keep well hydration avoid NSAID patient not at the goal for the hemoglobin A1c      7  Positive depression screening  Assessment & Plan:  PHQ 9 =7 patient has multiple underlying chronic condition and asked that he lost his wife he been spending most of the time at home discussed important control his a chronic condition   He had good supportive family will reevaluate          Depression Screening and Follow-up Plan: Patient's depression screening was positive with a PHQ-2 score of 6  Their PHQ-9 score was 7  Depression likely due to other medical condition  Will treat underlying condition           Subjective      HPI  Review of Systems    Current Outpatient Medications on File Prior to Visit   Medication Sig   • ACCU-CHEK SOFTCLIX LANCETS lancets by Does not apply route 2 (two) times a day   • acetaminophen (TYLENOL) 500 mg tablet Take 1 tablet (500 mg total) by mouth every 6 (six) hours as needed for mild pain   • aspirin-dipyridamole (AGGRENOX)  mg per 12 hr capsule Take 1 capsule by mouth every 12 (twelve) hours   • atorvastatin (LIPITOR) 80 mg tablet TAKE 1 TABLET BY MOUTH EVERY DAY WITH DINNER   • glucose blood test strip by In Vitro route 2 (two) times a day   • Januvia 100 MG tablet TAKE 1 TABLET BY MOUTH EVERY DAY   • Jardiance 25 MG TABS TAKE 1 TABLET BY MOUTH EVERY DAY IN THE MORNING       Objective     BP "120/70 (BP Location: Left arm, Patient Position: Sitting)   Pulse 76   Temp (!) 96 9 °F (36 1 °C) (Tympanic)   Ht 5' 10\" (1 778 m)   Wt 76 7 kg (169 lb)   SpO2 (!) 76%   BMI 24 25 kg/m²     Physical Exam  Cardiovascular:      Pulses: no weak pulses          Dorsalis pedis pulses are 2+ on the right side and 1+ on the left side  Feet:      Right foot:      Skin integrity: No warmth  Left foot:      Skin integrity: No warmth  Patient's shoes and socks removed  Right Foot/Ankle   Right Foot Inspection  Skin Exam: skin intact  No warmth and no pre-ulcer  Toe Exam: No swelling and erythema    Sensory   Monofilament testing: diminished    Vascular  Capillary refills: < 3 seconds  The right DP pulse is 2+  Left Foot/Ankle  Left Foot Inspection  Skin Exam: skin intact  No warmth and no pre-ulcer  Toe Exam: No swelling and no erythema  Sensory   Monofilament testing: diminished    Vascular  Capillary refills: < 3 seconds  The left DP pulse is 1+       Assign Risk Category  No deformity present  No loss of protective sensation  No weak pulses  Risk: 2    Alphonse Oh MD  "

## 2023-05-04 ENCOUNTER — TELEPHONE (OUTPATIENT)
Dept: ADMINISTRATIVE | Facility: OTHER | Age: 73
End: 2023-05-04

## 2023-05-04 NOTE — TELEPHONE ENCOUNTER
----- Message from Aguila Roper sent at 5/3/2023  2:46 PM EDT -----  Regarding: care gap request  05/03/23 2:46 PM    Hello, our patient attached above has had Diabetic Eye Exam completed/performed  Please assist in updating the patient chart by pulling the document from the Media Tab  The date of service is 1/30/2023       Thank you,  [phone] [place] [id number]

## 2023-05-05 DIAGNOSIS — I70.221 ATHEROSCLEROSIS OF NATIVE ARTERIES OF EXTREMITIES WITH REST PAIN, RIGHT LEG (HCC): ICD-10-CM

## 2023-05-05 DIAGNOSIS — E78.5 DYSLIPIDEMIA: ICD-10-CM

## 2023-05-05 RX ORDER — ATORVASTATIN CALCIUM 80 MG/1
TABLET, FILM COATED ORAL
Qty: 90 TABLET | Refills: 0 | Status: SHIPPED | OUTPATIENT
Start: 2023-05-05

## 2023-05-05 NOTE — TELEPHONE ENCOUNTER
Upon review of the In Basket request we were able to locate, review, and update the patient chart as requested for Diabetic Eye Exam     Any additional questions or concerns should be emailed to the Practice Liaisons via the appropriate education email address, please do not reply via In Basket      Thank you  Darrin Chavis

## 2023-06-01 DIAGNOSIS — E11.42 DIABETIC POLYNEUROPATHY ASSOCIATED WITH TYPE 2 DIABETES MELLITUS (HCC): ICD-10-CM

## 2023-06-01 RX ORDER — EMPAGLIFLOZIN 25 MG/1
TABLET, FILM COATED ORAL
Qty: 30 TABLET | Refills: 2 | Status: SHIPPED | OUTPATIENT
Start: 2023-06-01

## 2023-06-10 DIAGNOSIS — E11.42 DIABETIC POLYNEUROPATHY ASSOCIATED WITH TYPE 2 DIABETES MELLITUS (HCC): ICD-10-CM

## 2023-06-12 RX ORDER — SITAGLIPTIN 100 MG/1
TABLET, FILM COATED ORAL
Qty: 90 TABLET | Refills: 0 | Status: SHIPPED | OUTPATIENT
Start: 2023-06-12

## 2023-06-14 DIAGNOSIS — G45.8 OTHER SPECIFIED TRANSIENT CEREBRAL ISCHEMIAS: ICD-10-CM

## 2023-06-14 RX ORDER — ASPIRIN AND DIPYRIDAMOLE 25; 200 MG/1; MG/1
1 CAPSULE, EXTENDED RELEASE ORAL EVERY 12 HOURS
Qty: 180 CAPSULE | Refills: 0 | Status: SHIPPED | OUTPATIENT
Start: 2023-06-14

## 2023-08-01 DIAGNOSIS — I70.221 ATHEROSCLEROSIS OF NATIVE ARTERIES OF EXTREMITIES WITH REST PAIN, RIGHT LEG (HCC): ICD-10-CM

## 2023-08-01 DIAGNOSIS — E78.5 DYSLIPIDEMIA: ICD-10-CM

## 2023-08-01 RX ORDER — ATORVASTATIN CALCIUM 80 MG/1
TABLET, FILM COATED ORAL
Qty: 90 TABLET | Refills: 0 | Status: SHIPPED | OUTPATIENT
Start: 2023-08-01

## 2023-08-21 ENCOUNTER — TELEPHONE (OUTPATIENT)
Dept: FAMILY MEDICINE CLINIC | Facility: CLINIC | Age: 73
End: 2023-08-21

## 2023-09-08 DIAGNOSIS — E11.42 DIABETIC POLYNEUROPATHY ASSOCIATED WITH TYPE 2 DIABETES MELLITUS (HCC): ICD-10-CM

## 2023-09-08 RX ORDER — SITAGLIPTIN 100 MG/1
TABLET, FILM COATED ORAL
Qty: 90 TABLET | Refills: 0 | Status: SHIPPED | OUTPATIENT
Start: 2023-09-08

## 2023-09-11 DIAGNOSIS — G45.8 OTHER SPECIFIED TRANSIENT CEREBRAL ISCHEMIAS: ICD-10-CM

## 2023-09-11 RX ORDER — ASPIRIN AND DIPYRIDAMOLE 25; 200 MG/1; MG/1
1 CAPSULE, EXTENDED RELEASE ORAL EVERY 12 HOURS
Qty: 180 CAPSULE | Refills: 0 | Status: SHIPPED | OUTPATIENT
Start: 2023-09-11

## 2023-09-13 ENCOUNTER — TELEPHONE (OUTPATIENT)
Dept: VASCULAR SURGERY | Facility: CLINIC | Age: 73
End: 2023-09-13

## 2023-09-13 NOTE — TELEPHONE ENCOUNTER
Spoke with patient. Patient expressed they would not like to schedule the appointment(s) listed below. Per patient, they will reach out to our office when they are ready to schedule. Provided patient with call back number ([phone] option 3, should they need to reschedule. Provided patient with call back number ([phone] option 3, should they need to reschedule. Patient's appointment(s) are due now. Dopplers  [] Abdominal Aorta Iliac (AOIL)  [] Carotid (CV)   [] Celiac and/or Mesenteric  [] Endovascular Aortic Repair (EVAR)   [] Hemodialysis Access (HD)   [x] Lower Limb Arterial (MANUEL) 8/2023  [] Lower Limb Venous (LEV)  [] Lower Limb Venous Duplex with Reflux (LEVDR)  [] Renal Artery  [] Upper Limb Arterial (UEA)    [] Upper Limb Venous (UEV)              [] FAUSTINA and Waveform analysis     Advanced Imaging   [] CTA head/neck    [] CTA abdomen    [] CTA abdomen & pelvis    [] CT abdomen with/ without contrast  [] CT abdomen with contrast  [] CT abdomen without contrast    [] CT abdomen & pelvis with/ without contrast  [] CT abdomen & pelvis with contrast  [] CT abdomen & pelvis without contrast    Office Visit   [] New patient, patient last seen over 3 years ago  [x] Risk factor modification (RFM)   [] Follow up   [] Lost to follow up (LTFU)  Schedule with SALOME. L/S CGD 10/17/2021.  1 year RFM due now. Schedule MANUEL due 8/2023. Patient indicated he will call back if he would like to reschedule.

## 2023-10-24 ENCOUNTER — APPOINTMENT (OUTPATIENT)
Dept: LAB | Facility: MEDICAL CENTER | Age: 73
End: 2023-10-24
Payer: COMMERCIAL

## 2023-10-24 DIAGNOSIS — N18.32 CHRONIC KIDNEY DISEASE, STAGE 3B (HCC): ICD-10-CM

## 2023-10-24 DIAGNOSIS — E11.42 DIABETIC POLYNEUROPATHY ASSOCIATED WITH TYPE 2 DIABETES MELLITUS (HCC): ICD-10-CM

## 2023-10-24 DIAGNOSIS — E78.5 DYSLIPIDEMIA: ICD-10-CM

## 2023-10-24 DIAGNOSIS — I70.235 ATHEROSCLEROSIS OF NATIVE ARTERIES OF RIGHT LEG WITH ULCERATION OF OTHER PART OF FOOT (HCC): ICD-10-CM

## 2023-10-24 DIAGNOSIS — Z13.31 POSITIVE DEPRESSION SCREENING: ICD-10-CM

## 2023-10-24 DIAGNOSIS — M16.12 PRIMARY OSTEOARTHRITIS OF LEFT HIP: ICD-10-CM

## 2023-10-24 DIAGNOSIS — N18.31 TYPE 2 DIABETES MELLITUS WITH STAGE 3A CHRONIC KIDNEY DISEASE, WITHOUT LONG-TERM CURRENT USE OF INSULIN (HCC): ICD-10-CM

## 2023-10-24 DIAGNOSIS — E11.22 TYPE 2 DIABETES MELLITUS WITH STAGE 3A CHRONIC KIDNEY DISEASE, WITHOUT LONG-TERM CURRENT USE OF INSULIN (HCC): ICD-10-CM

## 2023-10-24 LAB
ALBUMIN SERPL BCP-MCNC: 4.6 G/DL (ref 3.5–5)
ALP SERPL-CCNC: 60 U/L (ref 34–104)
ALT SERPL W P-5'-P-CCNC: 18 U/L (ref 7–52)
ANION GAP SERPL CALCULATED.3IONS-SCNC: 11 MMOL/L
AST SERPL W P-5'-P-CCNC: 15 U/L (ref 13–39)
BASOPHILS # BLD AUTO: 0.04 THOUSANDS/ÂΜL (ref 0–0.1)
BASOPHILS NFR BLD AUTO: 1 % (ref 0–1)
BILIRUB SERPL-MCNC: 0.7 MG/DL (ref 0.2–1)
BUN SERPL-MCNC: 23 MG/DL (ref 5–25)
CALCIUM SERPL-MCNC: 9.6 MG/DL (ref 8.4–10.2)
CHLORIDE SERPL-SCNC: 99 MMOL/L (ref 96–108)
CHOLEST SERPL-MCNC: 108 MG/DL
CO2 SERPL-SCNC: 27 MMOL/L (ref 21–32)
CREAT SERPL-MCNC: 0.92 MG/DL (ref 0.6–1.3)
EOSINOPHIL # BLD AUTO: 0.12 THOUSAND/ÂΜL (ref 0–0.61)
EOSINOPHIL NFR BLD AUTO: 1 % (ref 0–6)
ERYTHROCYTE [DISTWIDTH] IN BLOOD BY AUTOMATED COUNT: 13.2 % (ref 11.6–15.1)
EST. AVERAGE GLUCOSE BLD GHB EST-MCNC: 269 MG/DL
GFR SERPL CREATININE-BSD FRML MDRD: 82 ML/MIN/1.73SQ M
GLUCOSE P FAST SERPL-MCNC: 221 MG/DL (ref 65–99)
HBA1C MFR BLD: 11 %
HCT VFR BLD AUTO: 50.6 % (ref 36.5–49.3)
HDLC SERPL-MCNC: 55 MG/DL
HGB BLD-MCNC: 17.2 G/DL (ref 12–17)
IMM GRANULOCYTES # BLD AUTO: 0.03 THOUSAND/UL (ref 0–0.2)
IMM GRANULOCYTES NFR BLD AUTO: 0 % (ref 0–2)
LDLC SERPL CALC-MCNC: 37 MG/DL (ref 0–100)
LYMPHOCYTES # BLD AUTO: 1.5 THOUSANDS/ÂΜL (ref 0.6–4.47)
LYMPHOCYTES NFR BLD AUTO: 17 % (ref 14–44)
MCH RBC QN AUTO: 31.7 PG (ref 26.8–34.3)
MCHC RBC AUTO-ENTMCNC: 34 G/DL (ref 31.4–37.4)
MCV RBC AUTO: 93 FL (ref 82–98)
MONOCYTES # BLD AUTO: 0.56 THOUSAND/ÂΜL (ref 0.17–1.22)
MONOCYTES NFR BLD AUTO: 6 % (ref 4–12)
NEUTROPHILS # BLD AUTO: 6.56 THOUSANDS/ÂΜL (ref 1.85–7.62)
NEUTS SEG NFR BLD AUTO: 75 % (ref 43–75)
NRBC BLD AUTO-RTO: 0 /100 WBCS
PLATELET # BLD AUTO: 223 THOUSANDS/UL (ref 149–390)
PMV BLD AUTO: 11.3 FL (ref 8.9–12.7)
POTASSIUM SERPL-SCNC: 4.3 MMOL/L (ref 3.5–5.3)
PROT SERPL-MCNC: 7.5 G/DL (ref 6.4–8.4)
RBC # BLD AUTO: 5.43 MILLION/UL (ref 3.88–5.62)
SODIUM SERPL-SCNC: 137 MMOL/L (ref 135–147)
TRIGL SERPL-MCNC: 78 MG/DL
TSH SERPL DL<=0.05 MIU/L-ACNC: 1.91 UIU/ML (ref 0.45–4.5)
WBC # BLD AUTO: 8.81 THOUSAND/UL (ref 4.31–10.16)

## 2023-10-24 PROCEDURE — 83036 HEMOGLOBIN GLYCOSYLATED A1C: CPT

## 2023-10-24 PROCEDURE — 36415 COLL VENOUS BLD VENIPUNCTURE: CPT

## 2023-10-24 PROCEDURE — 84443 ASSAY THYROID STIM HORMONE: CPT

## 2023-10-24 PROCEDURE — 85025 COMPLETE CBC W/AUTO DIFF WBC: CPT

## 2023-10-24 PROCEDURE — 3066F NEPHROPATHY DOC TX: CPT | Performed by: FAMILY MEDICINE

## 2023-10-24 PROCEDURE — 80053 COMPREHEN METABOLIC PANEL: CPT

## 2023-10-24 PROCEDURE — 80061 LIPID PANEL: CPT

## 2023-10-27 ENCOUNTER — OFFICE VISIT (OUTPATIENT)
Dept: FAMILY MEDICINE CLINIC | Facility: CLINIC | Age: 73
End: 2023-10-27
Payer: COMMERCIAL

## 2023-10-27 VITALS
BODY MASS INDEX: 24.91 KG/M2 | TEMPERATURE: 97 F | HEART RATE: 91 BPM | HEIGHT: 70 IN | DIASTOLIC BLOOD PRESSURE: 70 MMHG | WEIGHT: 174 LBS | OXYGEN SATURATION: 99 % | SYSTOLIC BLOOD PRESSURE: 110 MMHG

## 2023-10-27 DIAGNOSIS — E53.8 B12 NEUROPATHY (HCC): ICD-10-CM

## 2023-10-27 DIAGNOSIS — I70.221 ATHEROSCLEROSIS OF NATIVE ARTERIES OF EXTREMITIES WITH REST PAIN, RIGHT LEG (HCC): ICD-10-CM

## 2023-10-27 DIAGNOSIS — G45.8 OTHER SPECIFIED TRANSIENT CEREBRAL ISCHEMIAS: ICD-10-CM

## 2023-10-27 DIAGNOSIS — G61.81 CIDP (CHRONIC INFLAMMATORY DEMYELINATING POLYNEUROPATHY) (HCC): ICD-10-CM

## 2023-10-27 DIAGNOSIS — F33.9 DEPRESSION, RECURRENT (HCC): Primary | ICD-10-CM

## 2023-10-27 DIAGNOSIS — E78.5 DYSLIPIDEMIA: ICD-10-CM

## 2023-10-27 DIAGNOSIS — G63 B12 NEUROPATHY (HCC): ICD-10-CM

## 2023-10-27 DIAGNOSIS — E11.42 DIABETIC POLYNEUROPATHY ASSOCIATED WITH TYPE 2 DIABETES MELLITUS (HCC): ICD-10-CM

## 2023-10-27 DIAGNOSIS — Z53.20 LUNG CANCER SCREENING DECLINED BY PATIENT: ICD-10-CM

## 2023-10-27 PROCEDURE — 1160F RVW MEDS BY RX/DR IN RCRD: CPT | Performed by: FAMILY MEDICINE

## 2023-10-27 PROCEDURE — 99214 OFFICE O/P EST MOD 30 MIN: CPT | Performed by: FAMILY MEDICINE

## 2023-10-27 PROCEDURE — 3288F FALL RISK ASSESSMENT DOCD: CPT | Performed by: FAMILY MEDICINE

## 2023-10-27 PROCEDURE — 1159F MED LIST DOCD IN RCRD: CPT | Performed by: FAMILY MEDICINE

## 2023-10-27 PROCEDURE — 1100F PTFALLS ASSESS-DOCD GE2>/YR: CPT | Performed by: FAMILY MEDICINE

## 2023-10-27 PROCEDURE — 3725F SCREEN DEPRESSION PERFORMED: CPT | Performed by: FAMILY MEDICINE

## 2023-10-27 RX ORDER — ASPIRIN AND DIPYRIDAMOLE 25; 200 MG/1; MG/1
1 CAPSULE, EXTENDED RELEASE ORAL EVERY 12 HOURS
Qty: 180 CAPSULE | Refills: 0 | Status: SHIPPED | OUTPATIENT
Start: 2023-10-27

## 2023-10-27 RX ORDER — LANOLIN ALCOHOL/MO/W.PET/CERES
1000 CREAM (GRAM) TOPICAL DAILY
Qty: 90 TABLET | Refills: 0 | Status: SHIPPED | OUTPATIENT
Start: 2023-10-27

## 2023-10-27 RX ORDER — ATORVASTATIN CALCIUM 80 MG/1
80 TABLET, FILM COATED ORAL
Qty: 90 TABLET | Refills: 0 | Status: SHIPPED | OUTPATIENT
Start: 2023-10-27

## 2023-10-27 NOTE — ASSESSMENT & PLAN NOTE
Lab Results   Component Value Date    HGBA1C 11.0 (H) 10/24/2023   Chronic asymptomatic uncontrolled secondary patient compliant with the medication discussed with patient his hemoglobin A1c less than recommend to start on insulin he declined he would like to continue with his current medication try to take it daily and try to watch for his diet

## 2023-10-27 NOTE — ASSESSMENT & PLAN NOTE
Chronic recommend patient to continue vitamin B12 supplements and the plan to recheck vitamin B12 level

## 2023-10-27 NOTE — PROGRESS NOTES
Name: Aldo Gallegos      :       MRN: [id number]  Encounter Provider: Marian Zavaleta MD  Encounter Date: 10/27/2023   Encounter department: [address] [place] [address]     1. Depression, recurrent ([address])  Assessment & Plan:  Patient failed his condition admission currently not on any medication with supportive system his daughter      2. Other specified transient cerebral ischemias  Assessment & Plan:  Patient with history of TIA he is already on statin and Aggrenox discussed with patient important to control his blood sugar and compliant with the medication    Orders:  -     aspirin-dipyridamole (AGGRENOX)  mg per 12 hr capsule; Take 1 capsule by mouth every 12 (twelve) hours    3. Dyslipidemia  Assessment & Plan:  Chronic asymptomatic LDL therapeutic in diabetic patient patient will continue with atorvastatin 80 mg once a day low-fat diet reviewed with the patient    Orders:  -     atorvastatin (LIPITOR) 80 mg tablet; Take 1 tablet (80 mg total) by mouth daily with dinner    4. Atherosclerosis of native arteries of extremities with rest pain, right leg (HCC)  -     atorvastatin (LIPITOR) 80 mg tablet; Take 1 tablet (80 mg total) by mouth daily with dinner    5. Diabetic polyneuropathy associated with type 2 diabetes mellitus [facility])  Assessment & Plan:    Lab Results   Component Value Date    HGBA1C 11.0 (H) 10/24/2023   Chronic asymptomatic uncontrolled secondary patient compliant with the medication discussed with patient his hemoglobin A1c less than recommend to start on insulin he declined he would like to continue with his current medication try to take it daily and try to watch for his diet    Orders:  -     sitaGLIPtin (Januvia) 100 mg tablet;  Take 1 tablet (100 mg total) by mouth daily  -     Empagliflozin (Jardiance) 25 MG TABS; Take 1 tablet (25 mg total) by mouth every morning  -     Empagliflozin (Jardiance) 25 MG TABS; Take 1 tablet (25 mg total) by mouth daily    6. CIDP (chronic inflammatory demyelinating polyneuropathy) ([facility])    7. Lung cancer screening declined by patient  Assessment & Plan:  Patient declined lung cancer screening      8. B12 neuropathy ([address])  Assessment & Plan:  Chronic recommend patient to continue vitamin B12 supplements and the plan to recheck vitamin B12 level    Orders:  -     vitamin B-12 (VITAMIN B-12) 1,000 mcg tablet; Take 1 tablet (1,000 mcg total) by mouth daily        Falls Plan of Care: balance, strength, and gait training instructions were provided. Subjective      Patient here follow-up with a chronic condition patient known to have history of diabetes on Januvia 100 mg Jardiance 25 mg once a day his hemoglobin A1c uncontrolled patient informed he been forgetting to take his medication and he is not compliant with a low-carb diet patient also history of hypertension blood pressure well controlled recent blood work reviewed with the patient      Review of Systems   Constitutional:  Negative for chills and fever. HENT:  Negative for ear pain and sore throat. Eyes:  Negative for pain and visual disturbance. Respiratory:  Negative for cough and shortness of breath. Cardiovascular:  Negative for chest pain and palpitations. Gastrointestinal:  Negative for abdominal pain and vomiting. Genitourinary:  Negative for dysuria and hematuria. Musculoskeletal:  Negative for arthralgias and back pain. Skin:  Negative for color change and rash. Neurological:  Negative for seizures and syncope. All other systems reviewed and are negative.       Current Outpatient Medications on File Prior to Visit   Medication Sig   • ACCU-CHEK SOFTCLIX LANCETS lancets by Does not apply route 2 (two) times a day   • acetaminophen (TYLENOL) 500 mg tablet Take 1 tablet (500 mg total) by mouth every 6 (six) hours as needed for mild pain   • glucose blood test strip by In Vitro route 2 (two) times a day       Objective /70 (BP Location: Left arm, Patient Position: Sitting)   Pulse 91   Temp (!) 97 °F (36.1 °C) (Tympanic)   Ht 5' 10" (1.778 m)   Wt 78.9 kg (174 lb)   SpO2 99%   BMI 24.97 kg/m²     Physical Exam  Vitals and nursing note reviewed. Constitutional:       General: He is not in acute distress. Appearance: Normal appearance. He is well-developed. He is not diaphoretic. HENT:      Head: Normocephalic. Right Ear: Tympanic membrane, ear canal and external ear normal.      Left Ear: Tympanic membrane, ear canal and external ear normal.      Nose: Nose normal. No congestion or rhinorrhea. Mouth/Throat:      Mouth: Mucous membranes are moist.      Pharynx: Oropharynx is clear. No oropharyngeal exudate or posterior oropharyngeal erythema. Eyes:      General:         Right eye: No discharge. Left eye: No discharge. Conjunctiva/sclera: Conjunctivae normal.   Neck:      Vascular: No JVD. Cardiovascular:      Rate and Rhythm: Normal rate and regular rhythm. Heart sounds: Normal heart sounds. No murmur heard. No gallop. Pulmonary:      Effort: Pulmonary effort is normal. No respiratory distress. Breath sounds: Normal breath sounds. No stridor. No wheezing or rales. Chest:      Chest wall: No tenderness. Abdominal:      General: There is no distension. Palpations: Abdomen is soft. There is no mass. Tenderness: There is no abdominal tenderness. There is no rebound. Musculoskeletal:         General: No tenderness. Normal range of motion. Cervical back: Normal range of motion and neck supple. Lymphadenopathy:      Cervical: No cervical adenopathy. Skin:     General: Skin is warm. Findings: No erythema or rash. Neurological:      Mental Status: He is alert and oriented to person, place, and time. Sensory: No sensory deficit.       Gait: Gait normal.   Psychiatric:         Mood and Affect: Mood normal.         Behavior: Behavior normal. Emanuel Perez MD

## 2023-10-30 NOTE — ASSESSMENT & PLAN NOTE
Chronic asymptomatic LDL therapeutic in diabetic patient patient will continue with atorvastatin 80 mg once a day low-fat diet reviewed with the patient

## 2023-10-30 NOTE — ASSESSMENT & PLAN NOTE
Patient with history of TIA he is already on statin and Aggrenox discussed with patient important to control his blood sugar and compliant with the medication

## 2023-10-30 NOTE — ASSESSMENT & PLAN NOTE
Patient failed his condition admission currently not on any medication with supportive system his daughter

## 2023-11-08 ENCOUNTER — VBI (OUTPATIENT)
Dept: ADMINISTRATIVE | Facility: OTHER | Age: 73
End: 2023-11-08

## 2023-11-21 ENCOUNTER — TELEPHONE (OUTPATIENT)
Dept: VASCULAR SURGERY | Facility: CLINIC | Age: 73
End: 2023-11-21

## 2023-12-04 ENCOUNTER — RA CDI HCC (OUTPATIENT)
Dept: OTHER | Facility: HOSPITAL | Age: 73
End: 2023-12-04

## 2023-12-04 ENCOUNTER — HOSPITAL ENCOUNTER (OUTPATIENT)
Dept: NON INVASIVE DIAGNOSTICS | Facility: HOSPITAL | Age: 73
Discharge: HOME | End: 2023-12-04
Attending: SURGERY
Payer: COMMERCIAL

## 2023-12-04 DIAGNOSIS — I70.235 ATHEROSCLEROSIS OF NATIVE ARTERIES OF RIGHT LEG WITH ULCERATION OF OTHER PART OF FOOT (HCC): ICD-10-CM

## 2023-12-04 PROCEDURE — 93925 LOWER EXTREMITY STUDY: CPT

## 2023-12-04 PROCEDURE — 93923 UPR/LXTR ART STDY 3+ LVLS: CPT

## 2023-12-04 NOTE — PROGRESS NOTES
E11.51, e11.65  [facility] coding opportunities          Chart Reviewed number of suggestions sent to Provider: 2     Patients Insurance     Medicare Insurance: [id number]

## 2023-12-06 PROCEDURE — 93925 LOWER EXTREMITY STUDY: CPT | Performed by: SURGERY

## 2023-12-06 PROCEDURE — 93922 UPR/L XTREMITY ART 2 LEVELS: CPT | Performed by: SURGERY

## 2023-12-08 DIAGNOSIS — I73.9 PVD (PERIPHERAL VASCULAR DISEASE) (HCC): Primary | ICD-10-CM

## 2024-01-03 ENCOUNTER — OFFICE VISIT (OUTPATIENT)
Dept: VASCULAR SURGERY | Facility: HOSPITAL | Age: 74
End: 2024-01-03
Payer: COMMERCIAL

## 2024-01-03 ENCOUNTER — VBI (OUTPATIENT)
Dept: ADMINISTRATIVE | Facility: OTHER | Age: 74
End: 2024-01-03

## 2024-01-03 VITALS
DIASTOLIC BLOOD PRESSURE: 76 MMHG | WEIGHT: 174 LBS | HEIGHT: 70 IN | BODY MASS INDEX: 24.91 KG/M2 | HEART RATE: 85 BPM | SYSTOLIC BLOOD PRESSURE: 136 MMHG

## 2024-01-03 DIAGNOSIS — I70.235 ATHEROSCLEROSIS OF NATIVE ARTERIES OF RIGHT LEG WITH ULCERATION OF OTHER PART OF FOOT (HCC): Primary | ICD-10-CM

## 2024-01-03 DIAGNOSIS — I73.9 PVD (PERIPHERAL VASCULAR DISEASE) (HCC): ICD-10-CM

## 2024-01-03 PROCEDURE — 99213 OFFICE O/P EST LOW 20 MIN: CPT

## 2024-01-03 NOTE — ASSESSMENT & PLAN NOTE
73-year-old male with PMH of DM2 with neuropathy, foot drop, CKD, HLD , TIA, PAD w/ RLE arteriogram with SFA  angioplasty and drug-eluting stent placement 10/29/21 ([place]). He presents today for results review of noninvasive imaging done on 12/4/2023 for surveillance.  -MANUEL demonstrates diffuse disease in the RLE with focal profundus stenosis and patent mid/distal SFA stent. LLE with focal stenosis in the mid SFA with occlusion in the distal SFA with distal reconstitution in the proximal pop.  There is absence of flow in the left distal AT.  Tibioperoneal disease bilaterally.  Right FAUSTINA 1.05/86/60; left FAUSTINA 0.58/78/45.   -Denies claudication, rest pain, tissue loss.  Reports intermittent left hip pain up inclines.  -Educated patient on pathophysiology of peripheral arterial disease, available treatment options, and indications for treatment.   -No vascular surgical intervention indicated at this time.  Will continue to monitor with yearly arterial duplex.   -Discussed risk factor modification, including adequate glycemic and lipid control, smoking cessation, blood pressure, and lifestyle modifications.   -Continue medical optimization with daily ASA 81mg and statin therapy with LDL goal <100.   -Recommend starting a structured walking program 3-5 times/week for 30 minutes. Patient should walk until claudication symptoms occur, rest for 5-10 minutes, then continue to walk. Patient should increase distance each week.   -Follow up in 1 year.  -Instructed patient to call the office with questions, concerns, or new symptoms.

## 2024-01-03 NOTE — PATIENT INSTRUCTIONS
-Educated patient on pathophysiology of peripheral arterial disease, available treatment options, and indications for treatment.   -Discussed risk factor modification, including adequate glycemic and lipid control, smoking cessation, blood pressure, and lifestyle modifications.   -Continue medical optimization with daily ASA 81mg and statin therapy with LDL goal <100.   -Recommend starting a structured walking program 3-5 times/week for 30 minutes. Patient should walk until claudication symptoms occur, rest for 5-10 minutes, then continue to walk. Patient should increase distance each week.   -Follow up in 1 year   -repeat arterial duplex in 1 year  -Instructed patient to call the office with questions, concerns, or new symptoms.

## 2024-01-03 NOTE — PROGRESS NOTES
Assessment/Plan:    Atherosclerosis of native arteries of right leg with ulceration of other part of foot (HCC)  73-year-old male with PMH of DM2 with neuropathy, foot drop, CKD, HLD , TIA, PAD w/ RLE arteriogram with SFA  angioplasty and drug-eluting stent placement 10/29/21 ([facility]). He presents today for results review of noninvasive imaging done on 12/4/2023 for surveillance.    -MANUEL demonstrates diffuse disease in the RLE with focal profundus stenosis and patent mid/distal SFA stent. LLE with focal stenosis in the mid SFA with occlusion in the distal SFA with distal reconstitution in the proximal pop.  There is absence of flow in the left distal AT.  Tibioperoneal disease bilaterally.  Right FAUSTINA 1.05/86/60; left FAUSTINA 0.58/78/45.   -Denies claudication, rest pain, tissue loss.  Reports intermittent left hip pain up inclines.  -Educated patient on pathophysiology of peripheral arterial disease, available treatment options, and indications for treatment.   -No vascular surgical intervention indicated at this time.  Will continue to monitor with yearly arterial duplex.   -Discussed risk factor modification, including adequate glycemic and lipid control, smoking cessation, blood pressure, and lifestyle modifications.   -Continue medical optimization with daily ASA 81mg and statin therapy with LDL goal <100.   -Recommend starting a structured walking program 3-5 times/week for 30 minutes. Patient should walk until claudication symptoms occur, rest for 5-10 minutes, then continue to walk. Patient should increase distance each week.   -Adequate glucose control. Mgmt per PCP. Most recent A1c 11.0- not at goal.   -Follow up in 1 year.  -Instructed patient to call the office with questions, concerns, or new symptoms.          Diagnoses and all orders for this visit:    Atherosclerosis of native arteries of right leg with ulceration of other part of foot (HCC)  -     VAS lower limb arterial duplex, complete bilateral;  "Future    PVD (peripheral vascular disease) ([facility])  -     Ambulatory Referral to Vascular Surgery          Subjective:      Patient ID: Rick Power is a 73 y.o. male.    AWAIS Marks is a 73-year-old male with PMH of DM2 with neuropathy, foot drop, CKD, HLD , TIA, PAD w/ RLE arteriogram with SFA  angioplasty and drug-eluting stent placement 10/29/21 ([facility]). He presents today for results review of noninvasive imaging done on 12/4/2023 for surveillance.    He denies claudication, rest pain, or tissue loss. He had a RLE angiogram in 2021 for right foot rest pain. He reports occasional L hip pain when going up inclines but was told he has arthritis. He has not issues walking on flat surfaces and denies any disabling symptoms. He reports pain in both feet due to neuropathy. He states this pain has been going on since 2018 and is unchanged. He is taking aspirin and atorvastatin. His most recent A1c was 11.0%. He is a former smoker.     The following portions of the patient's history were reviewed and updated as appropriate: allergies, current medications, past family history, past medical history, past social history, past surgical history, and problem list.    Review of Systems   Constitutional: Negative.    HENT: Negative.     Eyes: Negative.    Respiratory: Negative.     Cardiovascular: Negative.    Gastrointestinal: Negative.    Endocrine: Negative.    Genitourinary: Negative.    Musculoskeletal: Negative.    Skin: Negative.    Allergic/Immunologic: Negative.    Neurological: Negative.    Hematological: Negative.    Psychiatric/Behavioral: Negative.       I have personally reviewed and made appropriate changes to the ROS that was input by the medical assistant.       Objective:      Vitals:    01/03/24 1317   BP: 136/76   BP Location: Left arm   Patient Position: Sitting   Cuff Size: Standard   Pulse: 85   Weight: 78.9 kg (174 lb)   Height: 5' 10\" (1.778 m)       Patient Active Problem List   Diagnosis    " Lumbar radiculopathy    Ambulatory dysfunction    Foot drop, bilateral    Neuropathy, peripheral, idiopathic    Low vitamin B12 level    Type 2 diabetes mellitus with hyperglycemia, without long-term current use of insulin (HCC)    B12 neuropathy (HCC)    Diabetic polyneuropathy associated with type 2 diabetes mellitus (HCC)    Dyslipidemia    Pulmonary nodule seen on imaging study    Transient cerebral ischemia    Atherosclerosis of native arteries of right leg with ulceration of other part of foot (HCC)    Claudication of right lower extremity (HCC)    Former smoker    Chronic kidney disease, stage 3b (HCC)    Hip pain, chronic, left    Side effect of medication    Immunization refused    Immunization counseling    Colon cancer screening declined    Lung cancer screening declined by patient    Primary osteoarthritis of left hip    Positive depression screening    Depression, recurrent (HCC)       Past Surgical History:   Procedure Laterality Date    EPIDURAL BLOCK INJECTION Bilateral 10/19/2017    Procedure: L5 TRANSFORAMINAL EPIDURAL STEROID INJECTION;  Surgeon: Trev Fajardo DO;  Location: [facility];  Service: Pain Management     IR AORTAGRAM WITH RUN-OFF  10/29/2021       History reviewed. No pertinent family history.    Social History     Socioeconomic History    Marital status:      Spouse name: Not on file    Number of children: Not on file    Years of education: Not on file    Highest education level: Not on file   Occupational History    Not on file   Tobacco Use    Smoking status: Former     Current packs/day: 0.00     Average packs/day: 1 pack/day for 53.7 years (53.7 ttl pk-yrs)     Types: Cigars, Cigarettes     Start date:      Quit date: 2019     Years since quittin.2     Passive exposure: Never    Smokeless tobacco: Never    Tobacco comments:     1 a day   Vaping Use    Vaping status: Never Used   Substance and Sexual Activity    Alcohol use: Yes     Comment: social    Drug use:  No    Sexual activity: Not Currently   Other Topics Concern    Not on file   Social History Narrative    No advance directives    No living will     Social Determinants of Health     Financial Resource Strain: Low Risk  (12/9/2022)    Overall Financial Resource Strain (CARDIA)     Difficulty of Paying Living Expenses: Not hard at all   Food Insecurity: No Food Insecurity (12/6/2021)    Hunger Vital Sign     Worried About Running Out of Food in the Last Year: Never true     Ran Out of Food in the Last Year: Never true   Transportation Needs: No Transportation Needs (12/9/2022)    PRAPARE - Transportation     Lack of Transportation (Medical): No     Lack of Transportation (Non-Medical): No   Physical Activity: Sufficiently Active (12/6/2021)    Exercise Vital Sign     Days of Exercise per Week: 7 days     Minutes of Exercise per Session: 30 min   Stress: No Stress Concern Present (12/6/2021)    [employer] - Occupational Stress Questionnaire     Feeling of Stress : Not at all   Social Connections: Socially Isolated (12/6/2021)    Social Connection and Isolation Panel [NHANES]     Frequency of Communication with Friends and Family: More than three times a week     Frequency of Social Gatherings with Friends and Family: Three times a week     Attends Rastafarian Services: Never     Active Member of Clubs or Organizations: No     Attends Club or Organization Meetings: Never     Marital Status:    Intimate Partner Violence: Not At Risk (12/6/2021)    Humiliation, Afraid, Rape, and Kick questionnaire     Fear of Current or Ex-Partner: No     Emotionally Abused: No     Physically Abused: No     Sexually Abused: No   Housing Stability: Unknown (12/6/2021)    Housing Stability Vital Sign     Unable to Pay for Housing in the Last Year: No     Number of Places Lived in the Last Year: Not on file     Unstable Housing in the Last Year: No       Allergies   Allergen Reactions    Metformin Diarrhea  "        Current Outpatient Medications:     ACCU-CHEK SOFTCLIX LANCETS lancets, by Does not apply route 2 (two) times a day, Disp: , Rfl:     acetaminophen (TYLENOL) 500 mg tablet, Take 1 tablet (500 mg total) by mouth every 6 (six) hours as needed for mild pain, Disp: 30 tablet, Rfl: 1    aspirin-dipyridamole (AGGRENOX)  mg per 12 hr capsule, Take 1 capsule by mouth every 12 (twelve) hours, Disp: 180 capsule, Rfl: 0    atorvastatin (LIPITOR) 80 mg tablet, Take 1 tablet (80 mg total) by mouth daily with dinner, Disp: 90 tablet, Rfl: 0    Empagliflozin (Jardiance) 25 MG TABS, Take 1 tablet (25 mg total) by mouth every morning, Disp: 90 tablet, Rfl: 0    Empagliflozin (Jardiance) 25 MG TABS, Take 1 tablet (25 mg total) by mouth daily, Disp: 90 tablet, Rfl: 1    glucose blood test strip, by In Vitro route 2 (two) times a day, Disp: , Rfl:     sitaGLIPtin (Januvia) 100 mg tablet, Take 1 tablet (100 mg total) by mouth daily, Disp: 90 tablet, Rfl: 0    vitamin B-12 (VITAMIN B-12) 1,000 mcg tablet, Take 1 tablet (1,000 mcg total) by mouth daily (Patient not taking: Reported on 1/3/2024), Disp: 90 tablet, Rfl: 0      /76 (BP Location: Left arm, Patient Position: Sitting, Cuff Size: Standard)   Pulse 85   Ht 5' 10\" (1.778 m)   Wt 78.9 kg (174 lb)   BMI 24.97 kg/m²          Physical Exam  Vitals and nursing note reviewed.   Constitutional:       Appearance: Normal appearance.   HENT:      Head: Normocephalic and atraumatic.   Cardiovascular:      Rate and Rhythm: Normal rate and regular rhythm.      Pulses:           Carotid pulses are 2+ on the right side and 2+ on the left side.       Radial pulses are 2+ on the right side and 2+ on the left side.        Dorsalis pedis pulses are detected w/ Doppler on the right side and detected w/ Doppler on the left side.        Posterior tibial pulses are detected w/ Doppler on the right side and detected w/ Doppler on the left side.      Heart sounds: Normal heart " sounds.   Pulmonary:      Effort: Pulmonary effort is normal. No respiratory distress.      Breath sounds: Normal breath sounds.   Abdominal:      General: Bowel sounds are normal. There is no distension.      Palpations: Abdomen is soft.      Comments: No abdominal bruit or pulsatile masses.    Musculoskeletal:         General: No swelling. Normal range of motion.      Cervical back: Normal range of motion and neck supple.      Right lower leg: No edema.      Left lower leg: No edema.   Skin:     General: Skin is warm.      Capillary Refill: Capillary refill takes 2 to 3 seconds.      Findings: No erythema.   Neurological:      General: No focal deficit present.      Mental Status: He is alert and oriented to person, place, and time.   Psychiatric:         Mood and Affect: Mood normal.         Behavior: Behavior normal.       Reina Aiken PA-C  [facility]  [phone]    I have spent a total time of 20 minutes on 01/03/24 in caring for this patient including Diagnostic results, Prognosis, Risks and benefits of tx options, Instructions for management, Patient and family education, Importance of tx compliance, Risk factor reductions, Impressions, Counseling / Coordination of care, Documenting in the medical record, Reviewing / ordering tests, medicine, procedures  , and Obtaining or reviewing history  .

## 2024-01-07 ENCOUNTER — RA CDI HCC (OUTPATIENT)
Dept: OTHER | Facility: HOSPITAL | Age: 74
End: 2024-01-07

## 2024-01-07 NOTE — PROGRESS NOTES
E11.22, e11.65  HCC coding opportunities          Chart Reviewed number of suggestions sent to Provider: 2     Patients Insurance     Medicare Insurance: [employer]

## 2024-01-12 ENCOUNTER — TELEPHONE (OUTPATIENT)
Dept: FAMILY MEDICINE CLINIC | Facility: CLINIC | Age: 74
End: 2024-01-12

## 2024-01-12 NOTE — TELEPHONE ENCOUNTER
Attempted to reach patient to remind him of upcoming appointment Monday 1/15/2024 @ 815am unable to leave voicemail full

## 2024-01-15 ENCOUNTER — OFFICE VISIT (OUTPATIENT)
Dept: FAMILY MEDICINE CLINIC | Facility: CLINIC | Age: 74
End: 2024-01-15
Payer: COMMERCIAL

## 2024-01-15 VITALS
OXYGEN SATURATION: 99 % | HEART RATE: 86 BPM | TEMPERATURE: 95.9 F | DIASTOLIC BLOOD PRESSURE: 80 MMHG | SYSTOLIC BLOOD PRESSURE: 138 MMHG | WEIGHT: 174.4 LBS | HEIGHT: 70 IN | BODY MASS INDEX: 24.97 KG/M2

## 2024-01-15 DIAGNOSIS — E66.3 OVERWEIGHT (BMI 25.0-29.9): ICD-10-CM

## 2024-01-15 DIAGNOSIS — G89.29 HIP PAIN, CHRONIC, LEFT: ICD-10-CM

## 2024-01-15 DIAGNOSIS — E11.65 TYPE 2 DIABETES MELLITUS WITH HYPERGLYCEMIA, WITHOUT LONG-TERM CURRENT USE OF INSULIN (HCC): ICD-10-CM

## 2024-01-15 DIAGNOSIS — Z53.20 LUNG CANCER SCREENING DECLINED BY PATIENT: ICD-10-CM

## 2024-01-15 DIAGNOSIS — E11.42 DIABETIC POLYNEUROPATHY ASSOCIATED WITH TYPE 2 DIABETES MELLITUS (HCC): ICD-10-CM

## 2024-01-15 DIAGNOSIS — Z53.20 COLON CANCER SCREENING DECLINED: ICD-10-CM

## 2024-01-15 DIAGNOSIS — Z00.00 MEDICARE ANNUAL WELLNESS VISIT, SUBSEQUENT: Primary | ICD-10-CM

## 2024-01-15 DIAGNOSIS — M25.552 HIP PAIN, CHRONIC, LEFT: ICD-10-CM

## 2024-01-15 DIAGNOSIS — G61.81 CIDP (CHRONIC INFLAMMATORY DEMYELINATING POLYNEUROPATHY) (HCC): ICD-10-CM

## 2024-01-15 DIAGNOSIS — N18.32 CHRONIC KIDNEY DISEASE, STAGE 3B (HCC): ICD-10-CM

## 2024-01-15 DIAGNOSIS — F33.9 DEPRESSION, RECURRENT (HCC): ICD-10-CM

## 2024-01-15 LAB
CREAT UR-MCNC: 46.8 MG/DL
MICROALBUMIN UR-MCNC: 17.3 MG/L
MICROALBUMIN/CREAT 24H UR: 37 MG/G CREATININE (ref 0–30)

## 2024-01-15 PROCEDURE — 99214 OFFICE O/P EST MOD 30 MIN: CPT | Performed by: FAMILY MEDICINE

## 2024-01-15 PROCEDURE — 82570 ASSAY OF URINE CREATININE: CPT | Performed by: FAMILY MEDICINE

## 2024-01-15 PROCEDURE — 82043 UR ALBUMIN QUANTITATIVE: CPT | Performed by: FAMILY MEDICINE

## 2024-01-15 PROCEDURE — G0439 PPPS, SUBSEQ VISIT: HCPCS | Performed by: FAMILY MEDICINE

## 2024-01-15 NOTE — PATIENT INSTRUCTIONS
Medicare Preventive Visit Patient Instructions  Thank you for completing your Welcome to Medicare Visit or Medicare Annual Wellness Visit today. Your next wellness visit will be due in one year (1/15/2025).  The screening/preventive services that you may require over the next 5-10 years are detailed below. Some tests may not apply to you based off risk factors and/or age. Screening tests ordered at today's visit but not completed yet may show as past due. Also, please note that scanned in results may not display below.  Preventive Screenings:  Service Recommendations Previous Testing/Comments   Colorectal Cancer Screening  Colonoscopy    Fecal Occult Blood Test (FOBT)/Fecal Immunochemical Test (FIT)  Fecal DNA/Cologuard Test  Flexible Sigmoidoscopy Age: 45-75 years old   Colonoscopy: every 10 years (May be performed more frequently if at higher risk)  OR  FOBT/FIT: every 1 year  OR  Cologuard: every 3 years  OR  Sigmoidoscopy: every 5 years  Screening may be recommended earlier than age 45 if at higher risk for colorectal cancer. Also, an individualized decision between you and your healthcare provider will decide whether screening between the ages of 76-85 would be appropriate. Colonoscopy: Not on file  FOBT/FIT: Not on file  Cologuard: Not on file  Sigmoidoscopy: Not on file          Prostate Cancer Screening Individualized decision between patient and health care provider in men between ages of 55-69   Medicare will cover every 12 months beginning on the day after your 50th birthday PSA: 0.7 ng/mL     Screening Current     Hepatitis C Screening Once for adults born between 1945 and 1965  More frequently in patients at high risk for Hepatitis C Hep C Antibody: 01/24/2022    Screening Current   Diabetes Screening 1-2 times per year if you're at risk for diabetes or have pre-diabetes Fasting glucose: 221 mg/dL (10/24/2023)  A1C: 11.0 % (10/24/2023)  Screening Not Indicated  History Diabetes   Cholesterol Screening  Once every 5 years if you don't have a lipid disorder. May order more often based on risk factors. Lipid panel: 10/24/2023  Screening Current      Other Preventive Screenings Covered by Medicare:  Abdominal Aortic Aneurysm (AAA) Screening: covered once if your at risk. You're considered to be at risk if you have a family history of AAA or a male between the age of 65-75 who smoking at least 100 cigarettes in your lifetime.  Lung Cancer Screening: covers low dose CT scan once per year if you meet all of the following conditions: (1) Age 55-77; (2) No signs or symptoms of lung cancer; (3) Current smoker or have quit smoking within the last 15 years; (4) You have a tobacco smoking history of at least 20 pack years (packs per day x number of years you smoked); (5) You get a written order from a healthcare provider.  Glaucoma Screening: covered annually if you're considered high risk: (1) You have diabetes OR (2) Family history of glaucoma OR (3)  aged 50 and older OR (4)  American aged 65 and older  Osteoporosis Screening: covered every 2 years if you meet one of the following conditions: (1) Have a vertebral abnormality; (2) On glucocorticoid therapy for more than 3 months; (3) Have primary hyperparathyroidism; (4) On osteoporosis medications and need to assess response to drug therapy.  HIV Screening: covered annually if you're between the age of 15-65. Also covered annually if you are younger than 15 and older than 65 with risk factors for HIV infection. For pregnant patients, it is covered up to 3 times per pregnancy.    Immunizations:  Immunization Recommendations   Influenza Vaccine Annual influenza vaccination during flu season is recommended for all persons aged >= 6 months who do not have contraindications   Pneumococcal Vaccine   * Pneumococcal conjugate vaccine = PCV13 (Prevnar 13), PCV15 (Vaxneuvance), PCV20 (Prevnar 20)  * Pneumococcal polysaccharide vaccine = PPSV23 (Pneumovax)  Adults 19-65 yo with certain risk factors or if 65+ yo  If never received any pneumonia vaccine: recommend Prevnar 20 (PCV20)  Give PCV20 if previously received 1 dose of PCV13 or PPSV23   Hepatitis B Vaccine 3 dose series if at intermediate or high risk (ex: diabetes, end stage renal disease, liver disease)   Respiratory syncytial virus (RSV) Vaccine - COVERED BY MEDICARE PART D  * RSVPreF3 (Arexvy) CDC recommends that adults 60 years of age and older may receive a single dose of RSV vaccine using shared clinical decision-making (SCDM)   Tetanus (Td) Vaccine - COST NOT COVERED BY MEDICARE PART B Following completion of primary series, a booster dose should be given every 10 years to maintain immunity against tetanus. Td may also be given as tetanus wound prophylaxis.   Tdap Vaccine - COST NOT COVERED BY MEDICARE PART B Recommended at least once for all adults. For pregnant patients, recommended with each pregnancy.   Shingles Vaccine (Shingrix) - COST NOT COVERED BY MEDICARE PART B  2 shot series recommended in those 19 years and older who have or will have weakened immune systems or those 50 years and older     Health Maintenance Due:      Topic Date Due   • Colorectal Cancer Screening  Never done   • Lung Cancer Screening  12/06/2018   • Hepatitis C Screening  Completed     Immunizations Due:      Topic Date Due   • Pneumococcal Vaccine: 65+ Years (1 - PCV) Never done   • Influenza Vaccine (1) 09/01/2023   • COVID-19 Vaccine (4 - 2023-24 season) 09/01/2023     Advance Directives   What are advance directives?  Advance directives are legal documents that state your wishes and plans for medical care. These plans are made ahead of time in case you lose your ability to make decisions for yourself. Advance directives can apply to any medical decision, such as the treatments you want, and if you want to donate organs.   What are the types of advance directives?  There are many types of advance directives, and each  state has rules about how to use them. You may choose a combination of any of the following:  Living will:  This is a written record of the treatment you want. You can also choose which treatments you do not want, which to limit, and which to stop at a certain time. This includes surgery, medicine, IV fluid, and tube feedings.   Durable power of  for healthcare (DPAHC):  This is a written record that states who you want to make healthcare choices for you when you are unable to make them for yourself. This person, called a proxy, is usually a family member or a friend. You may choose more than 1 proxy.  Do not resuscitate (DNR) order:  A DNR order is used in case your heart stops beating or you stop breathing. It is a request not to have certain forms of treatment, such as CPR. A DNR order may be included in other types of advance directives.  Medical directive:  This covers the care that you want if you are in a coma, near death, or unable to make decisions for yourself. You can list the treatments you want for each condition. Treatment may include pain medicine, surgery, blood transfusions, dialysis, IV or tube feedings, and a ventilator (breathing machine).  Values history:  This document has questions about your views, beliefs, and how you feel and think about life. This information can help others choose the care that you would choose.  Why are advance directives important?  An advance directive helps you control your care. Although spoken wishes may be used, it is better to have your wishes written down. Spoken wishes can be misunderstood, or not followed. Treatments may be given even if you do not want them. An advance directive may make it easier for your family to make difficult choices about your care.   Fall Prevention    Fall prevention  includes ways to make your home and other areas safer. It also includes ways you can move more carefully to prevent a fall. Health conditions that cause changes  in your blood pressure, vision, or muscle strength and coordination may increase your risk for falls. Medicines may also increase your risk for falls if they make you dizzy, weak, or sleepy.   Fall prevention tips:   Stand or sit up slowly.    Use assistive devices as directed.    Wear shoes that fit well and have soles that .    Wear a personal alarm.    Stay active.    Manage your medical conditions.    Home Safety Tips:  Add items to prevent falls in the bathroom.    Keep paths clear.    Install bright lights in your home.    Keep items you use often on shelves within reach.    Paint or place reflective tape on the edges of your stairs.    Weight Management   Why it is important to manage your weight:  Being overweight increases your risk of health conditions such as heart disease, high blood pressure, type 2 diabetes, and certain types of cancer. It can also increase your risk for osteoarthritis, sleep apnea, and other respiratory problems. Aim for a slow, steady weight loss. Even a small amount of weight loss can lower your risk of health problems.  How to lose weight safely:  A safe and healthy way to lose weight is to eat fewer calories and get regular exercise. You can lose up about 1 pound a week by decreasing the number of calories you eat by 500 calories each day.   Healthy meal plan for weight management:  A healthy meal plan includes a variety of foods, contains fewer calories, and helps you stay healthy. A healthy meal plan includes the following:  Eat whole-grain foods more often.  A healthy meal plan should contain fiber. Fiber is the part of grains, fruits, and vegetables that is not broken down by your body. Whole-grain foods are healthy and provide extra fiber in your diet. Some examples of whole-grain foods are whole-wheat breads and pastas, oatmeal, brown rice, and bulgur.  Eat a variety of vegetables every day.  Include dark, leafy greens such as spinach, kale, lilli greens, and mustard  greens. Eat yellow and orange vegetables such as carrots, sweet potatoes, and winter squash.   Eat a variety of fruits every day.  Choose fresh or canned fruit (canned in its own juice or light syrup) instead of juice. Fruit juice has very little or no fiber.  Eat low-fat dairy foods.  Drink fat-free (skim) milk or 1% milk. Eat fat-free yogurt and low-fat cottage cheese. Try low-fat cheeses such as mozzarella and other reduced-fat cheeses.  Choose meat and other protein foods that are low in fat.  Choose beans or other legumes such as split peas or lentils. Choose fish, skinless poultry (chicken or turkey), or lean cuts of red meat (beef or pork). Before you cook meat or poultry, cut off any visible fat.   Use less fat and oil.  Try baking foods instead of frying them. Add less fat, such as margarine, sour cream, regular salad dressing and mayonnaise to foods. Eat fewer high-fat foods. Some examples of high-fat foods include french fries, doughnuts, ice cream, and cakes.  Eat fewer sweets.  Limit foods and drinks that are high in sugar. This includes candy, cookies, regular soda, and sweetened drinks.  Exercise:  Exercise at least 30 minutes per day on most days of the week. Some examples of exercise include walking, biking, dancing, and swimming. You can also fit in more physical activity by taking the stairs instead of the elevator or parking farther away from stores. Ask your healthcare provider about the best exercise plan for you.   Alcohol Use and Your Health    Drinking too much can harm your health.  Excessive alcohol use leads to about 88,000 death in the [place] each year, and shortens the life of those who diet by almost 30 years.  Further, excessive drinking cost the economy $249 billion in 2010.  Most excessive drinkers are not alcohol dependent.    Excessive alcohol use has immediate effects that increase the risk of many harmful health conditions.  These are most often the result of binge  "drinking.  Over time, excessive alcohol use can lead to the development of chronic diseases and other series health problems.    What is considered a \"drink\"?        Excessive alcohol use includes:  Binge Drinking: For women, 4 or more drinks consumed on one occasion. For men, 5 or more drinks consumed on one occasion.  Heavy Drinking: For women, 8 or more drinks per week. For men, 15 or more drinks per week  Any alcohol used by pregnant women  Any alcohol used by those under the age of 21 years    If you choose to drink, do so in moderation:  Do not drink at all if you are under the age of 21, or if you are or may be pregnant, or have health problems that could be made worse by drinking.  For women, up to 1 drink per day  For men, up to 2 drinks a day    No one should begin drinking or drink more frequently based on potential health benefits    Short-Term Health Risks:  Injuries: motor vehicle crashes, falls, drownings, burns  Violence: homicide, suicide, sexual assault, intimate partner violence  Alcohol poisoning  Reproductive health: risky sexual behaviors, unintended prengnacy, sexually transmitted diseases, miscarriage, stillbirth, fetal alcohol syndrome    Long-Term Health Risks:  Chronic diseases: high blood pressure, heart disease, stroke, liver disease, digestive problems  Cancers: breast, mouth and throat, liver, colon  Learning and memory problems: dementia, poor school performance  Mental health: depression, anxiety, insomnia  Social problems: lost productivity, family problems, unemployment  Alcohol dependence    For support and more information:  Substance Abuse and Mental Health Services Administration  [address]  [place] [address]  Web Address: http://www.samhsa.gov    Alcoholics Anonymous        Web Address: http://www.aa.org    https://www.cdc.gov/alcohol/fact-sheets/alcohol-use.htm     © Copyright [employer] 2018 Information is for End User's use only and may not be sold, " redistributed or otherwise used for commercial purposes. All illustrations and images included in CareNotes® are the copyrighted property of [employer] or [employer]

## 2024-01-15 NOTE — PROGRESS NOTES
Assessment and Plan:     Problem List Items Addressed This Visit     Diabetic polyneuropathy associated with type 2 diabetes mellitus (HCC)       Lab Results   Component Value Date    HGBA1C 11.0 (H) 10/24/2023   Chronic asymptomatic and controlled patient compliant with the medication not compliant with diet patient has not been monitoring his blood sugar at home has not interested in doing PICC line insulin         Relevant Orders    Hemoglobin A1C    Comprehensive metabolic panel    CBC and differential    TSH, 3rd generation with Free T4 reflex    Lipid Panel with Direct LDL reflex    Chronic kidney disease, stage 3b (HCC)     Lab Results   Component Value Date    EGFR 82 10/24/2023    EGFR 85 03/28/2023    EGFR 60 10/03/2022    CREATININE 0.92 10/24/2023    CREATININE 0.90 03/28/2023    CREATININE 1.20 10/03/2022   Chronic asymptomatic improvement in the GFR encourage patient to continue well hydration avoid NSAID patient not at the goal for the hemoglobin A1c discussed the goal to have hemoglobin A1c below 7 and also blood pressure below 130/80         Relevant Orders    Hemoglobin A1C    Comprehensive metabolic panel    CBC and differential    TSH, 3rd generation with Free T4 reflex    Lipid Panel with Direct LDL reflex    Hip pain, chronic, left     Acute on chronic previous workup including x-ray done on November 2022 positive for arthritis discussed with the patient Tylenol for the pain secondary to chronic kidney disease recommend to avoid NSAIDs apply ice 20 minutes 3 times a day         Colon cancer screening declined     Patient declined colon cancer screening         Lung cancer screening declined by patient     He is former smoker but the fit the criteria to screen for lung cancer  discussed with the patient and he declined         Relevant Orders    Hemoglobin A1C    Comprehensive metabolic panel    CBC and differential    TSH, 3rd generation with Free T4 reflex    Lipid Panel with Direct LDL  reflex    Depression, recurrent (HCC)     Chronic patient declined medication         Relevant Orders    Hemoglobin A1C    Comprehensive metabolic panel    CBC and differential    TSH, 3rd generation with Free T4 reflex    Lipid Panel with Direct LDL reflex    Medicare annual wellness visit, subsequent - Primary     Advice and education were given regarding nutrition, aerobic exercises, weight-bearing exercises, cardiovascular risk reduction, fall risk reduction, and age-appropriate supplements.     The patient was counseled regarding instructions for management, risk factor reductions, prognosis, risks and benefits of treatment options, patient and family education, and importance of compliance with treatment.         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 Medications that increase falls were reviewed.     I discussed with him that he is a candidate for lung cancer CT screening.     The following Shared Decision-Making points were covered:  Benefits of screening were discussed, including the rates of reduction in death from lung cancer and other causes.  Harms of screening were reviewed, including false positive tests, radiation exposure levels, risks of invasive procedures, risks of complications of screening, and risk of  overdiagnosis.  I counseled on the importance of adherence to annual lung cancer LDCT screening, impact of co-morbidities, and ability or willingness to undergo diagnosis and treatment.  I counseled on the importance of maintaining abstinence as a former smoker or was counseled on the importance of smoking cessation if a current smoker    Review of Eligibility Criteria: He meets all of the criteria for Lung Cancer Screening.   He is 73 y.o.   He has 20 pack year tobacco history and is a current smoker or has quit within the past 15 years  He presents no signs or symptoms of lung cancer    After discussion, the patient decided to decline lung cancer screening.  Preventive health issues were discussed with patient, and age appropriate screening tests were ordered as noted in patient's After Visit Summary.  Personalized health advice and appropriate referrals for health education or preventive services given if needed, as noted in patient's After Visit Summary.     History of Present Illness:     Patient presents for a Medicare Wellness Visit    Patient here for Medicare exam and he is concerned about left hip pain he describes it as achy comes and goes it is a chronic but there is sometimes exacerbated no recent fall or trauma pain localized in the hip radiating to the left leg no numbness no muscle weakness no swelling no limited range of motion       Patient Care Team:  Adry Kirby MD as PCP - General (Family Medicine)  Phoenix Green DPM (Podiatry)  Segundo Barrios MD (Ophthalmology)     Review of Systems:     Review of Systems   Constitutional:  Negative for chills and fever.   HENT:  Negative for ear pain and sore throat.    Eyes:  Negative for pain and visual disturbance.   Respiratory:  Negative for cough and shortness of breath.    Cardiovascular:  Negative for chest pain and palpitations.   Gastrointestinal:  Negative for abdominal pain, constipation, diarrhea and vomiting.   Genitourinary:   Negative for dysuria and hematuria.   Musculoskeletal:  Positive for arthralgias.   Skin:  Negative for color change and rash.   Neurological:  Negative for seizures and syncope.   All other systems reviewed and are negative.       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)   • Claudication of right lower extremity (HCC)   • Former smoker   • Chronic kidney disease, stage 3b (HCC)   • Hip pain, chronic, left   • Side effect of medication   • Immunization refused   • Immunization counseling   • Colon cancer screening declined   • Lung cancer screening declined by patient   • Primary osteoarthritis of left hip   • Positive depression screening   • Depression, recurrent (HCC)   • Medicare annual wellness visit, subsequent      Past Medical and Surgical History:     Past Medical History:   Diagnosis Date   • Abnormal CT of the abdomen     last assessed 9/30/16   • Anemia    • Diabetes mellitus (HCC)    • Iron deficiency anemia     last assessed 4/26/16   • TIA (transient ischemic attack)      Past Surgical History:   Procedure Laterality Date   • EPIDURAL BLOCK INJECTION Bilateral 10/19/2017    Procedure: L5 TRANSFORAMINAL EPIDURAL STEROID INJECTION;  Surgeon: Trev Fajardo DO;  Location: [facility];  Service: Pain Management    • IR AORTAGRAM WITH RUN-OFF  10/29/2021      Family History:     History reviewed. No pertinent family history.   Social History:     Social History      Socioeconomic History   • Marital status:      Spouse name: None   • Number of children: None   • Years of education: None   • Highest education level: None   Occupational History   • None   Tobacco Use   • Smoking status: Former     Current packs/day: 0.00     Average packs/day: 1 pack/day for 53.7 years (53.7 ttl pk-yrs)     Types: Cigars, Cigarettes     Start date:      Quit date: 2019     Years since quittin.3     Passive exposure: Never   • Smokeless tobacco: Never   • Tobacco comments:     1 a day   Vaping Use   • Vaping status: Never Used   Substance and Sexual Activity   • Alcohol use: Yes     Comment: social   • Drug use: No   • Sexual activity: Not Currently   Other Topics Concern   • None   Social History Narrative    No advance directives    No living will     Social Determinants of Health     Financial Resource Strain: Low Risk  (1/15/2024)    Overall Financial Resource Strain (CARDIA)    • Difficulty of Paying Living Expenses: Not hard at all   Food Insecurity: No Food Insecurity (2021)    Hunger Vital Sign    • Worried About Running Out of Food in the Last Year: Never true    • Ran Out of Food in the Last Year: Never true   Transportation Needs: No Transportation Needs (1/15/2024)    PRAPARE - Transportation    • Lack of Transportation (Medical): No    • Lack of Transportation (Non-Medical): No   Physical Activity: Sufficiently Active (2021)    Exercise Vital Sign    • Days of Exercise per Week: 7 days    • Minutes of Exercise per Session: 30 min   Stress: No Stress Concern Present (2021)    [employer] - Occupational Stress Questionnaire    • Feeling of Stress : Not at all   Social Connections: Socially Isolated (2021)    Social Connection and Isolation Panel [NHANES]    • Frequency of Communication with Friends and Family: More than three times a week    • Frequency of Social Gatherings with Friends and Family: Three times a  week    • Attends [employer] Services: Never    • Active Member of Clubs or Organizations: No    • Attends Club or Organization Meetings: Never    • Marital Status:    Intimate Partner Violence: Not At Risk (12/6/2021)    Humiliation, Afraid, Rape, and Kick questionnaire    • Fear of Current or Ex-Partner: No    • Emotionally Abused: No    • Physically Abused: No    • Sexually Abused: No   Housing Stability: Unknown (12/6/2021)    Housing Stability Vital Sign    • Unable to Pay for Housing in the Last Year: No    • Number of Places Lived in the Last Year: Not on file    • Unstable Housing in the Last Year: No      Medications and Allergies:     Current Outpatient Medications   Medication Sig Dispense Refill   • ACCU-CHEK SOFTCLIX LANCETS lancets by Does not apply route 2 (two) times a day     • acetaminophen (TYLENOL) 500 mg tablet Take 1 tablet (500 mg total) by mouth every 6 (six) hours as needed for mild pain 30 tablet 1   • aspirin-dipyridamole (AGGRENOX)  mg per 12 hr capsule Take 1 capsule by mouth every 12 (twelve) hours 180 capsule 0   • atorvastatin (LIPITOR) 80 mg tablet Take 1 tablet (80 mg total) by mouth daily with dinner 90 tablet 0   • Empagliflozin (Jardiance) 25 MG TABS Take 1 tablet (25 mg total) by mouth every morning 90 tablet 0   • glucose blood test strip by In Vitro route 2 (two) times a day     • sitaGLIPtin (Januvia) 100 mg tablet Take 1 tablet (100 mg total) by mouth daily 90 tablet 0   • vitamin B-12 (VITAMIN B-12) 1,000 mcg tablet Take 1 tablet (1,000 mcg total) by mouth daily (Patient not taking: Reported on 1/3/2024) 90 tablet 0     No current facility-administered medications for this visit.     Allergies   Allergen Reactions   • Metformin Diarrhea      Immunizations:     Immunization History   Administered Date(s) Administered   • COVID-19 MODERNA VACC 0.25 ML IM BOOSTER 12/14/2021   • COVID-19 MODERNA VACC 0.5 ML IM 03/18/2021, 04/15/2021   • Influenza, high dose seasonal  0.7 mL 12/09/2022   • Influenza, recombinant, quadrivalent,injectable, preservative free 10/28/2020      Health Maintenance:         Topic Date Due   • Colorectal Cancer Screening  Never done   • Lung Cancer Screening  12/06/2018   • Hepatitis C Screening  Completed         Topic Date Due   • Pneumococcal Vaccine: 65+ Years (1 - PCV) Never done   • Influenza Vaccine (1) 09/01/2023   • COVID-19 Vaccine (4 - 2023-24 season) 09/01/2023      Medicare Screening Tests and Risk Assessments:     Rick is here for his Subsequent Wellness visit.     Health Risk Assessment:   Patient rates overall health as fair. Patient feels that their physical health rating is same. Patient is dissatisfied with their life. Eyesight was rated as same. Hearing was rated as slightly worse. Patient feels that their emotional and mental health rating is same. Patients states they are never, rarely angry. Patient states they are never, rarely unusually tired/fatigued. Pain experienced in the last 7 days has been a lot. Patient's pain rating has been 7/10. Left hip pain    Fall Risk Screening:   In the past year, patient has experienced: history of falling in past year    Number of falls: 2 or more  Injured during fall?: No    Feels unsteady when standing or walking?: Yes    Worried about falling?: Yes      Home Safety:  Patient has trouble with stairs inside or outside of their home. Patient has working smoke alarms and has working carbon monoxide detector. Home safety hazards include: none.     Nutrition:   Current diet is Regular.     Medications:   Patient is currently taking over-the-counter supplements. OTC medications include: see medication list. Patient is able to manage medications.     Activities of Daily Living (ADLs)/Instrumental Activities of Daily Living (IADLs):   Walk and transfer into and out of bed and chair?: Yes  Dress and groom yourself?: Yes    Bathe or shower yourself?: Yes    Feed yourself? Yes  Do your  laundry/housekeeping?: Yes  Manage your money, pay your bills and track your expenses?: Yes  Make your own meals?: Yes    Do your own shopping?: Yes    Previous Hospitalizations:   Any hospitalizations or ED visits within the last 12 months?: No      Advance Care Planning:   Living will: Yes    Durable POA for healthcare: Yes    Advanced directive: Yes    Five wishes given: No      Cognitive Screening:   Provider or family/friend/caregiver concerned regarding cognition?: No    PREVENTIVE SCREENINGS      Cardiovascular Screening:    General: Screening Current      Diabetes Screening:     General: Screening Not Indicated and History Diabetes      Colorectal Cancer Screening:     General: Patient Declines      Prostate Cancer Screening:    General: Screening Current      Osteoporosis Screening:    General: Patient Declines      Abdominal Aortic Aneurysm (AAA) Screening:    Risk factors include: age between 65-74 yo and tobacco use        General: Patient Declines      Lung Cancer Screening:     General: Patient Declines      Hepatitis C Screening:    General: Screening Current    Screening, Brief Intervention, and Referral to Treatment (SBIRT)    Screening  Typical number of drinks in a day: 2  Typical number of drinks in a week: 14  Interpretation: Low risk drinking behavior.    AUDIT-C Screenin) How often did you have a drink containing alcohol in the past year? 4 or more times a week  2) How many drinks did you have on a typical day when you were drinking in the past year? 1 to 2  3) How often did you have 6 or more drinks on one occasion in the past year? never    AUDIT-C Score: 4  Interpretation: Score 4-12 (male): POSITIVE screen for alcohol misuse    AUDIT Screenin) How often during the last year have you found that you were not able to stop drinking once you had started? 0 - never  5) How often during the last year have you failed to do what was normally expected from you because of drinking? 0 -  "never  6) How often during the last year have you needed a first drink in the morning to get yourself going after a heavy drinking session? 0 - never  7) How often during the last year have you had a feeling of guilt or remorse after drinking? 0 - never  8) How often during the last year have you been unable to remember what happened the night before because you had been drinking? 0 - never  9) Have you or someone else been injured as a result of your drinking? 0 - no  10) Has a relative or friend or a doctor or another health worker been concerned about your drinking or suggested you cut down? 0 - no    AUDIT Score: 4  Interpretation: Low risk alcohol consumption    Single Item Drug Screening:  How often have you used an illegal drug (including marijuana) or a prescription medication for non-medical reasons in the past year? never    Single Item Drug Screen Score: 0  Interpretation: Negative screen for possible drug use disorder    Brief Intervention  Alcohol & drug use screenings were reviewed. No concerns regarding substance use disorder identified.     No results found.     Physical Exam:     /80 (BP Location: Left arm, Patient Position: Sitting)   Pulse 86   Temp (!) 95.9 °F (35.5 °C) (Tympanic)   Ht 5' 10\" (1.778 m)   Wt 79.1 kg (174 lb 6.4 oz)   SpO2 99%   BMI 25.02 kg/m²     Physical Exam  Vitals and nursing note reviewed.   Constitutional:       General: He is not in acute distress.     Appearance: He is well-developed.   HENT:      Head: Normocephalic and atraumatic.      Right Ear: Tympanic membrane normal. There is no impacted cerumen.      Left Ear: Tympanic membrane normal. There is no impacted cerumen.      Nose: No rhinorrhea.      Mouth/Throat:      Pharynx: No posterior oropharyngeal erythema.   Eyes:      General:         Right eye: No discharge.         Left eye: No discharge.      Conjunctiva/sclera: Conjunctivae normal.   Cardiovascular:      Rate and Rhythm: Normal rate and regular " rhythm.      Heart sounds: No murmur heard.  Pulmonary:      Effort: Pulmonary effort is normal. No respiratory distress.      Breath sounds: Normal breath sounds.   Abdominal:      Palpations: Abdomen is soft.      Tenderness: There is no abdominal tenderness.   Musculoskeletal:         General: No swelling.      Cervical back: Neck supple.      Left hip: Tenderness and crepitus present. No bony tenderness. Normal range of motion.   Skin:     General: Skin is warm and dry.      Capillary Refill: Capillary refill takes less than 2 seconds.      Findings: No rash.   Neurological:      Mental Status: He is alert and oriented to person, place, and time.   Psychiatric:         Mood and Affect: Mood normal.          Adry Kirby MD

## 2024-01-15 NOTE — ASSESSMENT & PLAN NOTE
Acute on chronic previous workup including x-ray done on November 2022 positive for arthritis discussed with the patient Tylenol for the pain secondary to chronic kidney disease recommend to avoid NSAIDs apply ice 20 minutes 3 times a day

## 2024-01-15 NOTE — ASSESSMENT & PLAN NOTE
He is former smoker but the fit the criteria to screen for lung cancer  discussed with the patient and he declined

## 2024-01-15 NOTE — ASSESSMENT & PLAN NOTE
Lab Results   Component Value Date    HGBA1C 11.0 (H) 10/24/2023   Chronic asymptomatic and controlled patient compliant with the medication not compliant with diet patient has not been monitoring his blood sugar at home has not interested in doing PICC line insulin

## 2024-01-15 NOTE — ASSESSMENT & PLAN NOTE
Lab Results   Component Value Date    EGFR 82 10/24/2023    EGFR 85 03/28/2023    EGFR 60 10/03/2022    CREATININE 0.92 10/24/2023    CREATININE 0.90 03/28/2023    CREATININE 1.20 10/03/2022   Chronic asymptomatic improvement in the GFR encourage patient to continue well hydration avoid NSAID patient not at the goal for the hemoglobin A1c discussed the goal to have hemoglobin A1c below 7 and also blood pressure below 130/80

## 2024-01-20 DIAGNOSIS — G45.8 OTHER SPECIFIED TRANSIENT CEREBRAL ISCHEMIAS: ICD-10-CM

## 2024-01-20 DIAGNOSIS — I70.221 ATHEROSCLEROSIS OF NATIVE ARTERIES OF EXTREMITIES WITH REST PAIN, RIGHT LEG (HCC): ICD-10-CM

## 2024-01-20 DIAGNOSIS — E78.5 DYSLIPIDEMIA: ICD-10-CM

## 2024-01-22 RX ORDER — ASPIRIN AND DIPYRIDAMOLE 25; 200 MG/1; MG/1
1 CAPSULE, EXTENDED RELEASE ORAL EVERY 12 HOURS
Qty: 180 CAPSULE | Refills: 0 | Status: SHIPPED | OUTPATIENT
Start: 2024-01-22

## 2024-01-22 RX ORDER — ATORVASTATIN CALCIUM 80 MG/1
80 TABLET, FILM COATED ORAL
Qty: 90 TABLET | Refills: 0 | Status: SHIPPED | OUTPATIENT
Start: 2024-01-22

## 2024-02-09 ENCOUNTER — VBI (OUTPATIENT)
Dept: ADMINISTRATIVE | Facility: OTHER | Age: 74
End: 2024-02-09

## 2024-02-21 PROBLEM — Z00.00 MEDICARE ANNUAL WELLNESS VISIT, SUBSEQUENT: Status: RESOLVED | Noted: 2024-01-15 | Resolved: 2024-02-21

## 2024-03-20 ENCOUNTER — TELEPHONE (OUTPATIENT)
Dept: FAMILY MEDICINE CLINIC | Facility: CLINIC | Age: 74
End: 2024-03-20

## 2024-03-20 NOTE — TELEPHONE ENCOUNTER
Attempted to contact patient to advise that patient is due for colon cancer screening. No voicemail unable to leave message. [email or web address] message sent to patient.

## 2024-04-19 DIAGNOSIS — G45.8 OTHER SPECIFIED TRANSIENT CEREBRAL ISCHEMIAS: ICD-10-CM

## 2024-04-19 RX ORDER — ASPIRIN AND DIPYRIDAMOLE 25; 200 MG/1; MG/1
1 CAPSULE, EXTENDED RELEASE ORAL EVERY 12 HOURS
Qty: 60 CAPSULE | Refills: 0 | Status: SHIPPED | OUTPATIENT
Start: 2024-04-19

## 2024-04-26 DIAGNOSIS — E11.42 DIABETIC POLYNEUROPATHY ASSOCIATED WITH TYPE 2 DIABETES MELLITUS (HCC): ICD-10-CM

## 2024-04-27 RX ORDER — EMPAGLIFLOZIN 25 MG/1
25 TABLET, FILM COATED ORAL EVERY MORNING
Qty: 90 TABLET | Refills: 1 | Status: SHIPPED | OUTPATIENT
Start: 2024-04-27

## 2024-05-22 ENCOUNTER — VBI (OUTPATIENT)
Dept: ADMINISTRATIVE | Facility: OTHER | Age: 74
End: 2024-05-22

## 2024-05-27 DIAGNOSIS — G45.8 OTHER SPECIFIED TRANSIENT CEREBRAL ISCHEMIAS: ICD-10-CM

## 2024-05-28 RX ORDER — ASPIRIN AND DIPYRIDAMOLE 25; 200 MG/1; MG/1
1 CAPSULE, EXTENDED RELEASE ORAL EVERY 12 HOURS
Qty: 180 CAPSULE | Refills: 1 | Status: SHIPPED | OUTPATIENT
Start: 2024-05-28

## 2024-07-24 ENCOUNTER — RA CDI HCC (OUTPATIENT)
Dept: OTHER | Facility: HOSPITAL | Age: 74
End: 2024-07-24

## 2024-08-01 ENCOUNTER — OFFICE VISIT (OUTPATIENT)
Dept: FAMILY MEDICINE CLINIC | Facility: CLINIC | Age: 74
End: 2024-08-01
Payer: COMMERCIAL

## 2024-08-01 VITALS
HEIGHT: 71 IN | DIASTOLIC BLOOD PRESSURE: 72 MMHG | WEIGHT: 168.6 LBS | BODY MASS INDEX: 23.6 KG/M2 | HEART RATE: 95 BPM | TEMPERATURE: 96.6 F | SYSTOLIC BLOOD PRESSURE: 138 MMHG | OXYGEN SATURATION: 97 %

## 2024-08-01 DIAGNOSIS — E11.22 TYPE 2 DIABETES MELLITUS WITH STAGE 3A CHRONIC KIDNEY DISEASE, WITHOUT LONG-TERM CURRENT USE OF INSULIN (HCC): Primary | ICD-10-CM

## 2024-08-01 DIAGNOSIS — N18.31 TYPE 2 DIABETES MELLITUS WITH STAGE 3A CHRONIC KIDNEY DISEASE, WITHOUT LONG-TERM CURRENT USE OF INSULIN (HCC): Primary | ICD-10-CM

## 2024-08-01 DIAGNOSIS — E11.42 DIABETIC POLYNEUROPATHY ASSOCIATED WITH TYPE 2 DIABETES MELLITUS (HCC): ICD-10-CM

## 2024-08-01 LAB — SL AMB POCT HEMOGLOBIN AIC: 10.4 (ref ?–6.5)

## 2024-08-01 PROCEDURE — 99214 OFFICE O/P EST MOD 30 MIN: CPT | Performed by: PHYSICIAN ASSISTANT

## 2024-08-01 PROCEDURE — 83036 HEMOGLOBIN GLYCOSYLATED A1C: CPT | Performed by: PHYSICIAN ASSISTANT

## 2024-08-01 RX ORDER — BLOOD SUGAR DIAGNOSTIC
STRIP MISCELLANEOUS
Qty: 100 EACH | Refills: 3 | Status: SHIPPED | OUTPATIENT
Start: 2024-08-01

## 2024-08-01 RX ORDER — BLOOD-GLUCOSE METER
KIT MISCELLANEOUS
Qty: 1 KIT | Refills: 0 | Status: SHIPPED | OUTPATIENT
Start: 2024-08-01

## 2024-08-01 RX ORDER — LANCETS 33 GAUGE
EACH MISCELLANEOUS
Qty: 100 EACH | Refills: 3 | Status: SHIPPED | OUTPATIENT
Start: 2024-08-01

## 2024-08-01 NOTE — PROGRESS NOTES
Ambulatory Visit  Name: Rick Power      : 1950      MRN: [id number]  Encounter Provider: Darvin Keith PA-C  Encounter Date: 2024   Encounter department: [facility] PRIMARY CARE    Assessment & Plan   1. Type 2 diabetes mellitus with stage 3a chronic kidney disease, without long-term current use of insulin (HCC)  -     POCT hemoglobin A1c  -     Blood Glucose Monitoring Suppl (OneTouch Verio Reflect) w/Device KIT; Check blood sugars once daily. Please substitute with appropriate alternative as covered by patient's insurance. Dx: E11.65  -     glucose blood (OneTouch Verio) test strip; Check blood sugars once daily. Please substitute with appropriate alternative as covered by patient's insurance. Dx: E11.65  -     OneTouch Delica Lancets 33G MISC; Check blood sugars once daily. Please substitute with appropriate alternative as covered by patient's insurance. Dx: E11.65  -     Albumin / creatinine urine ratio; Future  -     Lipid panel; Future  -     T4, free; Future  -     TSH, 3rd generation; Future  2. Diabetic polyneuropathy associated with type 2 diabetes mellitus (HCC)  -     sitaGLIPtin (Januvia) 100 mg tablet; Take 1 tablet (100 mg total) by mouth daily    Patient 73 year old male PMH T2DM, neuropathy of feet, Osteoarthritis of left hip, CKD, depression, dyslipidemia, presenting today to transition care within [facility]. He denied any concerns today and feels well. Patient is candidate for both colorectal cancer screening and CT lung cancer screen secondary to hx of tobacco smoking but declines.     Pt is a type II diabetic w/ stage III CKD with an A1c of 10.4 POC today. Follows with both podiatrist and optometrist. Diabetes ROS completed and was positive for peripheral neuropathy but denied any vision changes, polyuria, polydipsia, polyphagia. Has not taken daily sugar in one year due to not having supplies at home. Pt is currently maxed on both januvia and jardiance, has been on  metformin in past but will not take due to GI side effects. Discussion was had with patient of the potential consequences of uncontrolled blood sugar and adding either insulin or GLP-1 was recommended to patient. He declined adding any medication at this time. Dietary modification and increased exercise was recommended. Blood glucose monitoring supplies ordered for patient and he will take one month of fasting sugars and follow up in one month for DM recheck and DM foot exam.     Depression Screening and Follow-up Plan: Patient was screened for depression during today's encounter. They screened negative with a PHQ-9 score of 2.      History of Present Illness     Rick Power is a 73 y.o. male PMH T2DM, peripheral neuropathy, CKD, depression, osteoarthritis of left hip, and dyslipidemia presenting today to transition care within [facility]. He has no concerns today and feels well. ROS negative    Diabetes  He presents for his follow-up diabetic visit. He has type 2 diabetes mellitus. His disease course has been worsening. Pertinent negatives for hypoglycemia include no confusion, nervousness/anxiousness, pallor, seizures, speech difficulty or sweats. Pertinent negatives for diabetes include no chest pain, no foot paresthesias, no polydipsia, no polyphagia, no polyuria, no visual change, no weakness and no weight loss. Symptoms are stable. Diabetic complications include peripheral neuropathy. Pertinent negatives for diabetic complications include no retinopathy. Risk factors for coronary artery disease include dyslipidemia, male sex and tobacco exposure. Current diabetic treatment includes oral agent (dual therapy). He is compliant with treatment most of the time. He is following a generally unhealthy diet. Meal planning includes avoidance of concentrated sweets. He has not had a previous visit with a dietitian. He participates in exercise intermittently (at work.). An ACE inhibitor/angiotensin II receptor blocker  "is not being taken. He sees a podiatrist (Dr. Green).Eye exam is current.       Review of Systems   Constitutional:  Negative for chills, fever and weight loss.   HENT:  Negative for ear pain and sore throat.    Eyes:  Negative for pain and visual disturbance.   Respiratory:  Negative for cough and shortness of breath.    Cardiovascular:  Negative for chest pain and palpitations.   Gastrointestinal:  Negative for abdominal pain, constipation, diarrhea, nausea and vomiting.   Endocrine: Negative for polydipsia, polyphagia and polyuria.   Genitourinary:  Negative for dysuria and hematuria.   Musculoskeletal:  Negative for arthralgias and back pain.   Skin:  Negative for color change, pallor and rash.   Neurological:  Negative for seizures, syncope, speech difficulty and weakness.   Psychiatric/Behavioral:  Negative for confusion. The patient is not nervous/anxious.    All other systems reviewed and are negative.    Medical History Reviewed by provider this encounter:  Meds       Objective     /72 (BP Location: Right arm)   Pulse 95   Temp (!) 96.6 °F (35.9 °C) (Tympanic)   Ht 5' 10.5\" (1.791 m)   Wt 76.5 kg (168 lb 9.6 oz)   SpO2 97%   BMI 23.85 kg/m²     Physical Exam  Vitals reviewed.   Constitutional:       Appearance: Normal appearance.   HENT:      Head: Normocephalic.      Right Ear: External ear normal.      Left Ear: External ear normal.      Nose: Nose normal.      Mouth/Throat:      Mouth: Mucous membranes are moist.      Pharynx: Oropharynx is clear.   Eyes:      Conjunctiva/sclera: Conjunctivae normal.   Cardiovascular:      Rate and Rhythm: Normal rate and regular rhythm.      Heart sounds: Normal heart sounds.   Pulmonary:      Effort: Pulmonary effort is normal.      Breath sounds: Normal breath sounds.   Abdominal:      General: Bowel sounds are normal.      Palpations: Abdomen is soft.   Musculoskeletal:         General: No swelling. Normal range of motion.      Right lower leg: No " edema.      Left lower leg: No edema.   Neurological:      Mental Status: He is alert and oriented to person, place, and time.   Psychiatric:         Behavior: Behavior normal.       Administrative Statements

## 2024-08-02 ENCOUNTER — APPOINTMENT (OUTPATIENT)
Dept: LAB | Facility: CLINIC | Age: 74
End: 2024-08-02
Payer: COMMERCIAL

## 2024-08-02 DIAGNOSIS — N18.31 TYPE 2 DIABETES MELLITUS WITH STAGE 3A CHRONIC KIDNEY DISEASE, WITHOUT LONG-TERM CURRENT USE OF INSULIN (HCC): ICD-10-CM

## 2024-08-02 DIAGNOSIS — E11.22 TYPE 2 DIABETES MELLITUS WITH STAGE 3A CHRONIC KIDNEY DISEASE, WITHOUT LONG-TERM CURRENT USE OF INSULIN (HCC): ICD-10-CM

## 2024-08-02 LAB
CHOLEST SERPL-MCNC: 227 MG/DL
CREAT UR-MCNC: 55.7 MG/DL
HDLC SERPL-MCNC: 76 MG/DL
LDLC SERPL CALC-MCNC: 126 MG/DL (ref 0–100)
MICROALBUMIN UR-MCNC: 12.8 MG/L
MICROALBUMIN/CREAT 24H UR: 23 MG/G CREATININE (ref 0–30)
NONHDLC SERPL-MCNC: 151 MG/DL
T4 FREE SERPL-MCNC: 0.96 NG/DL (ref 0.61–1.12)
TRIGL SERPL-MCNC: 124 MG/DL
TSH SERPL DL<=0.05 MIU/L-ACNC: 1.78 UIU/ML (ref 0.45–4.5)

## 2024-08-02 PROCEDURE — 82570 ASSAY OF URINE CREATININE: CPT

## 2024-08-02 PROCEDURE — 84439 ASSAY OF FREE THYROXINE: CPT

## 2024-08-02 PROCEDURE — 36415 COLL VENOUS BLD VENIPUNCTURE: CPT

## 2024-08-02 PROCEDURE — 82043 UR ALBUMIN QUANTITATIVE: CPT

## 2024-08-02 PROCEDURE — 84443 ASSAY THYROID STIM HORMONE: CPT

## 2024-08-02 PROCEDURE — 80061 LIPID PANEL: CPT

## 2024-09-03 ENCOUNTER — OFFICE VISIT (OUTPATIENT)
Dept: FAMILY MEDICINE CLINIC | Facility: CLINIC | Age: 74
End: 2024-09-03
Payer: COMMERCIAL

## 2024-09-03 VITALS
OXYGEN SATURATION: 97 % | HEIGHT: 71 IN | SYSTOLIC BLOOD PRESSURE: 128 MMHG | DIASTOLIC BLOOD PRESSURE: 62 MMHG | BODY MASS INDEX: 23.27 KG/M2 | TEMPERATURE: 63.3 F | WEIGHT: 166.2 LBS | HEART RATE: 72 BPM

## 2024-09-03 DIAGNOSIS — E11.22 TYPE 2 DIABETES MELLITUS WITH STAGE 3A CHRONIC KIDNEY DISEASE, WITHOUT LONG-TERM CURRENT USE OF INSULIN (HCC): ICD-10-CM

## 2024-09-03 DIAGNOSIS — E11.42 DIABETIC POLYNEUROPATHY ASSOCIATED WITH TYPE 2 DIABETES MELLITUS (HCC): ICD-10-CM

## 2024-09-03 DIAGNOSIS — N18.31 TYPE 2 DIABETES MELLITUS WITH STAGE 3A CHRONIC KIDNEY DISEASE, WITHOUT LONG-TERM CURRENT USE OF INSULIN (HCC): ICD-10-CM

## 2024-09-03 PROCEDURE — 1159F MED LIST DOCD IN RCRD: CPT | Performed by: PHYSICIAN ASSISTANT

## 2024-09-03 PROCEDURE — 1160F RVW MEDS BY RX/DR IN RCRD: CPT | Performed by: PHYSICIAN ASSISTANT

## 2024-09-03 PROCEDURE — 3066F NEPHROPATHY DOC TX: CPT | Performed by: PHYSICIAN ASSISTANT

## 2024-09-03 PROCEDURE — G2211 COMPLEX E/M VISIT ADD ON: HCPCS | Performed by: PHYSICIAN ASSISTANT

## 2024-09-03 PROCEDURE — 99214 OFFICE O/P EST MOD 30 MIN: CPT | Performed by: PHYSICIAN ASSISTANT

## 2024-09-03 RX ORDER — BLOOD SUGAR DIAGNOSTIC
STRIP MISCELLANEOUS
Qty: 100 EACH | Refills: 3 | Status: SHIPPED | OUTPATIENT
Start: 2024-09-03

## 2024-09-03 NOTE — PROGRESS NOTES
Ambulatory Visit  Name: Rick Power      : 1950      MRN: [id number]  Encounter Provider: Darvin Keith PA-C  Encounter Date: 9/3/2024   Encounter department: [facility] PRIMARY CARE    Assessment & Plan   1. Diabetic polyneuropathy associated with type 2 diabetes mellitus (HCC)  -     sitaGLIPtin (Januvia) 100 mg tablet; Take 1 tablet (100 mg total) by mouth daily  2. Type 2 diabetes mellitus with stage 3a chronic kidney disease, without long-term current use of insulin (HCC)  -     glucose blood (OneTouch Verio) test strip; Check blood sugars once daily. Please substitute with appropriate alternative as covered by patient's insurance. Dx: E11.65  -     CBC and differential; Future  -     Hemoglobin A1C; Future  -     Comprehensive metabolic panel; Future    Patient is a 73 year old male presenting today for DM recheck. Last A1c in 2024 was 10.4. Patient prescribed dual therapy of jardiance and januvia but has only been taking jardiance for the last month as he says the order for januvia never arrived to pharmacy. Has not been taking daily blood sugars as he says he never received test strips. Patient again declines recommendation to either add insulin or other glucose lowering agents, preferring to continue improving blood sugar w/ diet and exercise.    Diabetic ROS completed and was negative. DM foot exam performed and WNL. Patient established w/ both optometry and podiatry. Order for test strips and januvia sent to pharmacy of choice, confirmed with patient. Also advised patient to contact office when order at pharmacy does not match recommended plan discussed at visits in clinic. Patient to follow up in 2 months for DM recheck.      History of Present Illness     Diabetes  He presents for his follow-up diabetic visit. He has type 2 diabetes mellitus. The initial diagnosis of diabetes was made 20 years ago. There are no hypoglycemic associated symptoms. Pertinent negatives for  "hypoglycemia include no seizures. Pertinent negatives for diabetes include no blurred vision, no chest pain, no fatigue, no polydipsia, no polyphagia and no polyuria. There are no hypoglycemic complications. Symptoms are stable. Diabetic complications include peripheral neuropathy. Pertinent negatives for diabetic complications include no retinopathy. Risk factors for coronary artery disease include dyslipidemia, hypertension, male sex and tobacco exposure. Current diabetic treatment includes oral agent (dual therapy). He is compliant with treatment all of the time. He is following a generally unhealthy diet. When asked about meal planning, he reported none. He participates in exercise intermittently. He sees a podiatrist.Eye exam is current.       Review of Systems   Constitutional:  Negative for chills, fatigue and fever.   HENT:  Negative for ear pain and sore throat.    Eyes:  Negative for blurred vision, pain and visual disturbance.   Respiratory:  Negative for cough and shortness of breath.    Cardiovascular:  Negative for chest pain and palpitations.   Gastrointestinal:  Negative for abdominal pain and vomiting.   Endocrine: Negative for polydipsia, polyphagia and polyuria.   Genitourinary:  Negative for dysuria and hematuria.   Musculoskeletal:  Negative for arthralgias and back pain.   Skin:  Negative for color change and rash.   Neurological:  Negative for seizures and syncope.   All other systems reviewed and are negative.      Objective     /62   Pulse 72   Temp (!) 63.3 °F (17.4 °C)   Ht 5' 10.5\" (1.791 m)   Wt 75.4 kg (166 lb 3.2 oz)   SpO2 97%   BMI 23.51 kg/m²     Physical Exam  Constitutional:       Appearance: Normal appearance.   HENT:      Head: Normocephalic.      Right Ear: External ear normal.      Left Ear: External ear normal.      Nose: Nose normal.      Mouth/Throat:      Mouth: Mucous membranes are moist.      Pharynx: Oropharynx is clear.   Eyes:      Conjunctiva/sclera: " Conjunctivae normal.   Cardiovascular:      Rate and Rhythm: Normal rate and regular rhythm.      Pulses: no weak pulses.           Dorsalis pedis pulses are 2+ on the right side and 2+ on the left side.        Posterior tibial pulses are 2+ on the right side and 2+ on the left side.      Heart sounds: Normal heart sounds.   Pulmonary:      Effort: Pulmonary effort is normal.      Breath sounds: Normal breath sounds.   Abdominal:      General: Bowel sounds are normal.      Palpations: Abdomen is soft.   Feet:      Right foot:      Skin integrity: No ulcer, skin breakdown, erythema, warmth, callus or dry skin.      Left foot:      Skin integrity: No ulcer, skin breakdown, erythema, warmth, callus or dry skin.   Neurological:      Mental Status: He is alert and oriented to person, place, and time.   Psychiatric:         Behavior: Behavior normal.       Administrative Statements     Diabetic Foot Exam    Patient's shoes and socks removed.    Right Foot/Ankle   Right Foot Inspection  Skin Exam: skin normal and skin intact. No dry skin, no warmth, no callus, no erythema, no maceration, no abnormal color, no pre-ulcer, no ulcer and no callus.     Toe Exam: ROM and strength within normal limits.     Sensory   Vibration: intact  Proprioception: intact  Monofilament testing: intact    Vascular  Capillary refills: < 3 seconds  The right DP pulse is 2+. The right PT pulse is 2+.     Left Foot/Ankle  Left Foot Inspection  Skin Exam: skin normal and skin intact. No dry skin, no warmth, no erythema, no maceration, normal color, no pre-ulcer, no ulcer and no callus.     Toe Exam: ROM and strength within normal limits.     Sensory   Vibration: intact  Proprioception: intact  Monofilament testing: intact    Vascular  Capillary refills: < 3 seconds  The left DP pulse is 2+. The left PT pulse is 2+.     Assign Risk Category  No deformity present  No loss of protective sensation  No weak pulses  Risk: 0

## 2024-09-13 DIAGNOSIS — G45.8 OTHER SPECIFIED TRANSIENT CEREBRAL ISCHEMIAS: ICD-10-CM

## 2024-09-13 RX ORDER — ASPIRIN AND DIPYRIDAMOLE 25; 200 MG/1; MG/1
1 CAPSULE, EXTENDED RELEASE ORAL EVERY 12 HOURS
Qty: 60 CAPSULE | Refills: 0 | Status: SHIPPED | OUTPATIENT
Start: 2024-09-13

## 2024-10-27 ENCOUNTER — RA CDI HCC (OUTPATIENT)
Dept: OTHER | Facility: HOSPITAL | Age: 74
End: 2024-10-27

## 2024-11-04 ENCOUNTER — OFFICE VISIT (OUTPATIENT)
Dept: FAMILY MEDICINE CLINIC | Facility: CLINIC | Age: 74
End: 2024-11-04
Payer: COMMERCIAL

## 2024-11-04 VITALS
BODY MASS INDEX: 22.65 KG/M2 | TEMPERATURE: 96.1 F | DIASTOLIC BLOOD PRESSURE: 66 MMHG | SYSTOLIC BLOOD PRESSURE: 108 MMHG | HEIGHT: 71 IN | OXYGEN SATURATION: 99 % | HEART RATE: 78 BPM | WEIGHT: 161.8 LBS

## 2024-11-04 DIAGNOSIS — N18.31 TYPE 2 DIABETES MELLITUS WITH STAGE 3A CHRONIC KIDNEY DISEASE, WITHOUT LONG-TERM CURRENT USE OF INSULIN (HCC): Primary | ICD-10-CM

## 2024-11-04 DIAGNOSIS — M16.12 PRIMARY OSTEOARTHRITIS OF LEFT HIP: ICD-10-CM

## 2024-11-04 DIAGNOSIS — E11.42 DIABETIC POLYNEUROPATHY ASSOCIATED WITH TYPE 2 DIABETES MELLITUS (HCC): ICD-10-CM

## 2024-11-04 DIAGNOSIS — E11.22 TYPE 2 DIABETES MELLITUS WITH STAGE 3A CHRONIC KIDNEY DISEASE, WITHOUT LONG-TERM CURRENT USE OF INSULIN (HCC): Primary | ICD-10-CM

## 2024-11-04 LAB — SL AMB POCT HEMOGLOBIN AIC: 9.8 (ref ?–6.5)

## 2024-11-04 PROCEDURE — 83036 HEMOGLOBIN GLYCOSYLATED A1C: CPT | Performed by: PHYSICIAN ASSISTANT

## 2024-11-04 PROCEDURE — 99214 OFFICE O/P EST MOD 30 MIN: CPT | Performed by: PHYSICIAN ASSISTANT

## 2024-11-04 RX ORDER — BLOOD SUGAR DIAGNOSTIC
STRIP MISCELLANEOUS
Qty: 100 EACH | Refills: 3 | Status: SHIPPED | OUTPATIENT
Start: 2024-11-04

## 2024-11-04 NOTE — PROGRESS NOTES
Ambulatory Visit  Name: Rick Power      : 1950      MRN: [id number]  Encounter Provider: Darvin Keith PA-C  Encounter Date: 2024   Encounter department: [facility] PRIMARY CARE    Assessment & Plan  Diabetic polyneuropathy associated with type 2 diabetes mellitus (HCC)    Lab Results   Component Value Date    HGBA1C 9.8 (A) 2024      -Currently taking jardiance and januvia QD. Compliant w/ medication.    - A1c improved from 10.4 to 9.8.    - Patient still not at goal however states he does not have interest at this time of adding additional pharmacotherapy. Pt was counseled on risks of having chronically elevated blood sugar.    - DM foot and eye exams up to date.    - Refills provided. Patient to return in January for AWV    Orders:    Empagliflozin (Jardiance) 25 MG TABS; Take 1 tablet (25 mg total) by mouth every morning    POCT hemoglobin A1c    Type 2 diabetes mellitus with stage 3a chronic kidney disease, without long-term current use of insulin ([facility])    Lab Results   Component Value Date    HGBA1C 9.8 (A) 2024       Orders:    POCT hemoglobin A1c    glucose blood (OneTouch Verio) test strip; Check blood sugars once daily. Please substitute with appropriate alternative as covered by patient's insurance. Dx: E11.65    Primary osteoarthritis of left hip   -Patient noted hx of Left hip pain that comes and goes. Occasionally admits that it causes difficulty ambulating. Takes occasional tylenol for relief which provides mild improvement.    - X-ray in  noted moderate left hip osteoarthritis   - PCP to collect updated left hip x ray and placed referral to orthopedics.   Orders:    Ambulatory Referral to Orthopedic Surgery; Future    XR hip/pelv 2-3 vws left if performed; Future       History of Present Illness     Diabetes  He presents for his follow-up diabetic visit. He has type 2 diabetes mellitus. His disease course has been improving. Pertinent negatives for  "hypoglycemia include no mood changes, nervousness/anxiousness, pallor or seizures. Pertinent negatives for diabetes include no chest pain, no polydipsia, no polyphagia and no polyuria. There are no hypoglycemic complications. Symptoms are stable. Risk factors for coronary artery disease include diabetes mellitus and male sex. He is compliant with treatment all of the time. His weight is stable. He is following a generally healthy diet. Meal planning includes avoidance of concentrated sweets. He has not had a previous visit with a dietitian. He participates in exercise intermittently.       Review of Systems   Constitutional:  Negative for chills and fever.   HENT:  Negative for ear pain and sore throat.    Eyes:  Negative for pain and visual disturbance.   Respiratory:  Negative for cough and shortness of breath.    Cardiovascular:  Negative for chest pain and palpitations.   Gastrointestinal:  Negative for abdominal pain and vomiting.   Endocrine: Negative for polydipsia, polyphagia and polyuria.   Genitourinary:  Negative for dysuria and hematuria.   Musculoskeletal:  Negative for arthralgias and back pain.   Skin:  Negative for color change, pallor and rash.   Neurological:  Negative for seizures and syncope.   Psychiatric/Behavioral:  The patient is not nervous/anxious.    All other systems reviewed and are negative.    Medical History Reviewed by provider this encounter:  Tobacco  Allergies  Meds  Problems  Med Hx  Surg Hx  Fam Hx           Objective     /66   Pulse 78   Temp (!) 96.1 °F (35.6 °C)   Ht 5' 10.5\" (1.791 m)   Wt 73.4 kg (161 lb 12.8 oz)   SpO2 99%   BMI 22.89 kg/m²     Physical Exam  Constitutional:       Appearance: Normal appearance.   HENT:      Head: Normocephalic.      Right Ear: External ear normal.      Left Ear: External ear normal.      Nose: Nose normal.      Mouth/Throat:      Mouth: Mucous membranes are moist.      Pharynx: Oropharynx is clear.   Eyes:      " Conjunctiva/sclera: Conjunctivae normal.   Cardiovascular:      Rate and Rhythm: Normal rate and regular rhythm.      Heart sounds: Normal heart sounds.   Pulmonary:      Effort: Pulmonary effort is normal.      Breath sounds: Normal breath sounds.   Abdominal:      General: Bowel sounds are normal.      Palpations: Abdomen is soft.   Neurological:      Mental Status: He is alert and oriented to person, place, and time.   Psychiatric:         Behavior: Behavior normal.

## 2024-11-04 NOTE — ASSESSMENT & PLAN NOTE
-Patient noted hx of Left hip pain that comes and goes. Occasionally admits that it causes difficulty ambulating. Takes occasional tylenol for relief which provides mild improvement.    - X-ray in 2022 noted moderate left hip osteoarthritis   - PCP to collect updated left hip x ray and placed referral to orthopedics.   Orders:    Ambulatory Referral to Orthopedic Surgery; Future    XR hip/pelv 2-3 vws left if performed; Future

## 2024-11-04 NOTE — ASSESSMENT & PLAN NOTE
Lab Results   Component Value Date    HGBA1C 9.8 (A) 11/04/2024      -Currently taking jardiance and januvia QD. Compliant w/ medication.    - A1c improved from 10.4 to 9.8.    - Patient still not at goal however states he does not have interest at this time of adding additional pharmacotherapy. Pt was counseled on risks of having chronically elevated blood sugar.    - DM foot and eye exams up to date.    - Refills provided. Patient to return in January for AWV    Orders:    Empagliflozin (Jardiance) 25 MG TABS; Take 1 tablet (25 mg total) by mouth every morning    POCT hemoglobin A1c

## 2024-12-07 DIAGNOSIS — G45.8 OTHER SPECIFIED TRANSIENT CEREBRAL ISCHEMIAS: ICD-10-CM

## 2024-12-09 RX ORDER — ASPIRIN AND DIPYRIDAMOLE 25; 200 MG/1; MG/1
1 CAPSULE, EXTENDED RELEASE ORAL EVERY 12 HOURS
Qty: 60 CAPSULE | Refills: 0 | Status: SHIPPED | OUTPATIENT
Start: 2024-12-09 | End: 2024-12-09 | Stop reason: SDUPTHER

## 2024-12-09 RX ORDER — ASPIRIN AND DIPYRIDAMOLE 25; 200 MG/1; MG/1
1 CAPSULE, EXTENDED RELEASE ORAL EVERY 12 HOURS
Qty: 60 CAPSULE | Refills: 0 | Status: SHIPPED | OUTPATIENT
Start: 2024-12-09

## 2024-12-12 ENCOUNTER — VBI (OUTPATIENT)
Dept: ADMINISTRATIVE | Facility: OTHER | Age: 74
End: 2024-12-12

## 2024-12-13 NOTE — TELEPHONE ENCOUNTER
12/12/24 8:17 PM     Chart reviewed for Hemoglobin A1c ; nothing is submitted to the patient's insurance at this time.     Antonia Burris   [facility]

## 2025-01-10 ENCOUNTER — RA CDI HCC (OUTPATIENT)
Dept: OTHER | Facility: HOSPITAL | Age: 75
End: 2025-01-10

## 2025-01-10 NOTE — PROGRESS NOTES
E11.51 for 2025  HCC coding opportunities          Chart Reviewed number of suggestions sent to Provider: 1     Patients Insurance     Medicare Insurance: [employer]vantage

## 2025-01-14 ENCOUNTER — APPOINTMENT (OUTPATIENT)
Dept: LAB | Facility: CLINIC | Age: 75
End: 2025-01-14
Payer: COMMERCIAL

## 2025-01-14 DIAGNOSIS — N18.31 TYPE 2 DIABETES MELLITUS WITH STAGE 3A CHRONIC KIDNEY DISEASE, WITHOUT LONG-TERM CURRENT USE OF INSULIN (HCC): ICD-10-CM

## 2025-01-14 DIAGNOSIS — E11.22 TYPE 2 DIABETES MELLITUS WITH STAGE 3A CHRONIC KIDNEY DISEASE, WITHOUT LONG-TERM CURRENT USE OF INSULIN (HCC): ICD-10-CM

## 2025-01-14 LAB
ALBUMIN SERPL BCG-MCNC: 4.3 G/DL (ref 3.5–5)
ALP SERPL-CCNC: 61 U/L (ref 34–104)
ALT SERPL W P-5'-P-CCNC: 10 U/L (ref 7–52)
ANION GAP SERPL CALCULATED.3IONS-SCNC: 10 MMOL/L (ref 4–13)
AST SERPL W P-5'-P-CCNC: 9 U/L (ref 13–39)
BASOPHILS # BLD AUTO: 0.03 THOUSANDS/ΜL (ref 0–0.1)
BASOPHILS NFR BLD AUTO: 0 % (ref 0–1)
BILIRUB SERPL-MCNC: 0.62 MG/DL (ref 0.2–1)
BUN SERPL-MCNC: 12 MG/DL (ref 5–25)
CALCIUM SERPL-MCNC: 9.2 MG/DL (ref 8.4–10.2)
CHLORIDE SERPL-SCNC: 99 MMOL/L (ref 96–108)
CO2 SERPL-SCNC: 29 MMOL/L (ref 21–32)
CREAT SERPL-MCNC: 0.82 MG/DL (ref 0.6–1.3)
EOSINOPHIL # BLD AUTO: 0.05 THOUSAND/ΜL (ref 0–0.61)
EOSINOPHIL NFR BLD AUTO: 1 % (ref 0–6)
ERYTHROCYTE [DISTWIDTH] IN BLOOD BY AUTOMATED COUNT: 13.4 % (ref 11.6–15.1)
EST. AVERAGE GLUCOSE BLD GHB EST-MCNC: 243 MG/DL
GFR SERPL CREATININE-BSD FRML MDRD: 87 ML/MIN/1.73SQ M
GLUCOSE P FAST SERPL-MCNC: 295 MG/DL (ref 65–99)
HBA1C MFR BLD: 10.1 %
HCT VFR BLD AUTO: 51.6 % (ref 36.5–49.3)
HGB BLD-MCNC: 17.4 G/DL (ref 12–17)
IMM GRANULOCYTES # BLD AUTO: 0.03 THOUSAND/UL (ref 0–0.2)
IMM GRANULOCYTES NFR BLD AUTO: 0 % (ref 0–2)
LYMPHOCYTES # BLD AUTO: 1.56 THOUSANDS/ΜL (ref 0.6–4.47)
LYMPHOCYTES NFR BLD AUTO: 18 % (ref 14–44)
MCH RBC QN AUTO: 32 PG (ref 26.8–34.3)
MCHC RBC AUTO-ENTMCNC: 33.7 G/DL (ref 31.4–37.4)
MCV RBC AUTO: 95 FL (ref 82–98)
MONOCYTES # BLD AUTO: 0.56 THOUSAND/ΜL (ref 0.17–1.22)
MONOCYTES NFR BLD AUTO: 6 % (ref 4–12)
NEUTROPHILS # BLD AUTO: 6.49 THOUSANDS/ΜL (ref 1.85–7.62)
NEUTS SEG NFR BLD AUTO: 75 % (ref 43–75)
NRBC BLD AUTO-RTO: 0 /100 WBCS
PLATELET # BLD AUTO: 248 THOUSANDS/UL (ref 149–390)
PMV BLD AUTO: 10.4 FL (ref 8.9–12.7)
POTASSIUM SERPL-SCNC: 4.4 MMOL/L (ref 3.5–5.3)
PROT SERPL-MCNC: 7.2 G/DL (ref 6.4–8.4)
RBC # BLD AUTO: 5.44 MILLION/UL (ref 3.88–5.62)
SODIUM SERPL-SCNC: 138 MMOL/L (ref 135–147)
WBC # BLD AUTO: 8.72 THOUSAND/UL (ref 4.31–10.16)

## 2025-01-14 PROCEDURE — 36415 COLL VENOUS BLD VENIPUNCTURE: CPT

## 2025-01-14 PROCEDURE — 80053 COMPREHEN METABOLIC PANEL: CPT

## 2025-01-14 PROCEDURE — 85025 COMPLETE CBC W/AUTO DIFF WBC: CPT

## 2025-01-14 PROCEDURE — 83036 HEMOGLOBIN GLYCOSYLATED A1C: CPT

## 2025-01-15 ENCOUNTER — RESULTS FOLLOW-UP (OUTPATIENT)
Dept: FAMILY MEDICINE CLINIC | Facility: CLINIC | Age: 75
End: 2025-01-15

## 2025-01-17 ENCOUNTER — OFFICE VISIT (OUTPATIENT)
Dept: FAMILY MEDICINE CLINIC | Facility: CLINIC | Age: 75
End: 2025-01-17
Payer: COMMERCIAL

## 2025-01-17 VITALS
WEIGHT: 158.4 LBS | TEMPERATURE: 95.3 F | SYSTOLIC BLOOD PRESSURE: 102 MMHG | DIASTOLIC BLOOD PRESSURE: 60 MMHG | HEART RATE: 71 BPM | BODY MASS INDEX: 22.41 KG/M2 | OXYGEN SATURATION: 97 %

## 2025-01-17 DIAGNOSIS — Z00.00 MEDICARE ANNUAL WELLNESS VISIT, SUBSEQUENT: ICD-10-CM

## 2025-01-17 DIAGNOSIS — I73.9 PVD (PERIPHERAL VASCULAR DISEASE) (HCC): ICD-10-CM

## 2025-01-17 DIAGNOSIS — F33.9 DEPRESSION, RECURRENT (HCC): ICD-10-CM

## 2025-01-17 DIAGNOSIS — Z12.2 SCREENING FOR LUNG CANCER: Primary | ICD-10-CM

## 2025-01-17 DIAGNOSIS — N18.31 TYPE 2 DIABETES MELLITUS WITH STAGE 3A CHRONIC KIDNEY DISEASE, WITHOUT LONG-TERM CURRENT USE OF INSULIN (HCC): ICD-10-CM

## 2025-01-17 DIAGNOSIS — E11.22 TYPE 2 DIABETES MELLITUS WITH STAGE 3A CHRONIC KIDNEY DISEASE, WITHOUT LONG-TERM CURRENT USE OF INSULIN (HCC): ICD-10-CM

## 2025-01-17 DIAGNOSIS — E11.42 DIABETIC POLYNEUROPATHY ASSOCIATED WITH TYPE 2 DIABETES MELLITUS (HCC): ICD-10-CM

## 2025-01-17 DIAGNOSIS — Z87.891 PERSONAL HISTORY OF NICOTINE DEPENDENCE: ICD-10-CM

## 2025-01-17 PROBLEM — N18.32 CHRONIC KIDNEY DISEASE, STAGE 3B (HCC): Status: RESOLVED | Noted: 2022-01-26 | Resolved: 2025-01-17

## 2025-01-17 PROBLEM — I70.235 ATHEROSCLEROSIS OF NATIVE ARTERIES OF RIGHT LEG WITH ULCERATION OF OTHER PART OF FOOT (HCC): Status: RESOLVED | Noted: 2021-10-26 | Resolved: 2025-01-17

## 2025-01-17 PROCEDURE — 99214 OFFICE O/P EST MOD 30 MIN: CPT | Performed by: PHYSICIAN ASSISTANT

## 2025-01-17 PROCEDURE — G2211 COMPLEX E/M VISIT ADD ON: HCPCS | Performed by: PHYSICIAN ASSISTANT

## 2025-01-17 PROCEDURE — G0439 PPPS, SUBSEQ VISIT: HCPCS | Performed by: PHYSICIAN ASSISTANT

## 2025-01-17 NOTE — PROGRESS NOTES
Name: Rick Power      : 1950      MRN: [id number]  Encounter Provider: Darvin Keith PA-C  Encounter Date: 2025   Encounter department: [facility]    Assessment & Plan  Depression, recurrent (HCC)  Depression Screening Follow-up Plan: Patient's depression screening was positive with a PHQ-9 score of 11. Patient assessed for underlying major depression. They have no active suicidal ideations. Brief counseling provided and recommend additional follow-up/re-evaluation next office visit.         PVD (peripheral vascular disease) (HCC)     - Follows annually w/ vascular surgery   - Denies current claudication, rest pain   - Continue regular follow up        Diabetic polyneuropathy associated with type 2 diabetes mellitus (HCC)    Lab Results   Component Value Date    HGBA1C 10.1 (H) 2025            Type 2 diabetes mellitus with stage 3a chronic kidney disease, without long-term current use of insulin (HCC)    Lab Results   Component Value Date    HGBA1C 10.1 (H) 2025        - Labs collected 2025 significant for A1c 10.1   - Patient current regimen includes jardiance 25mg, januvia 100mg both QD which he is compliant with    - Admits he does not monitor sweets intake and often indulges   - Pt educated on long term consequences of diabetes, patient not interested in pursuing additional medication or insulin at this time    - Will continue w/ current regimen       Screening for lung cancer  I discussed with him that he is a candidate for lung cancer CT screening.     The following Shared Decision-Making points were covered:  Benefits of screening were discussed, including the rates of reduction in death from lung cancer and other causes.  Harms of screening were reviewed, including false positive tests, radiation exposure levels, risks of invasive procedures, risks of complications of screening, and risk of overdiagnosis.  I counseled on the importance of  adherence to annual lung cancer LDCT screening, impact of co-morbidities, and ability or willingness to undergo diagnosis and treatment.  I counseled on the importance of maintaining abstinence as a former smoker or was counseled on the importance of smoking cessation if a current smoker    Review of Eligibility Criteria: He meets all of the criteria for Lung Cancer Screening.   He is 74 y.o.   He has 20 pack year tobacco history and is a current smoker or has quit within the past 15 years  He presents no signs or symptoms of lung cancer    After discussion, the patient decided to elect lung cancer screening.    Orders:    CT lung screening program; Future    Personal history of nicotine dependence    Orders:    CT lung screening program; Future      Depression Screening and Follow-up Plan: Patient's depression screening was positive with a PHQ-9 score of 11.   Patient assessed for underlying major depression. Brief counseling provided and recommend additional follow-up/re-evaluation next office visit. Patient w/ longstanding depression since loss of wife x 9 years ago. Wero SI/HI, AH/VH. Discussed adding pharmacotherapy or referral to talk therapy, but patient declines both at this time.   Falls Plan of Care: balance, strength, and gait training instructions were provided. Assessed feet and footwear. Patient offered PT referral and declined at this time.       Preventive health issues were discussed with patient, and age appropriate screening tests were ordered as noted in patient's After Visit Summary. Personalized health advice and appropriate referrals for health education or preventive services given if needed, as noted in patient's After Visit Summary. Patient due for lung cancer screening due to smoking history which patient states he will complete. Order placed. Patient also due for CRC screening but patient declines colonoscopy, cologuard, FIT testing.     History of Present Illness     HPI   Patient Care  Team:  Darvin Keith PA-C as PCP - General (Family Medicine)  Phoenix Green DPM (Podiatry)  Segundo Barrios MD (Ophthalmology)    Review of Systems   Constitutional:  Negative for chills and fever.   HENT:  Negative for ear pain and sore throat.    Eyes:  Negative for pain and visual disturbance.   Respiratory:  Negative for cough and shortness of breath.    Cardiovascular:  Negative for chest pain and palpitations.   Gastrointestinal:  Negative for abdominal pain and vomiting.   Genitourinary:  Negative for dysuria and hematuria.   Musculoskeletal:  Negative for arthralgias and back pain.   Skin:  Negative for color change and rash.   Neurological:  Negative for seizures and syncope.   All other systems reviewed and are negative.    Medical History Reviewed by provider this encounter:       Annual Wellness Visit Questionnaire   Rick is here for his Subsequent Wellness visit.     Health Risk Assessment:   Patient rates overall health as fair. Patient feels that their physical health rating is slightly worse. Patient is dissatisfied with their life. Eyesight was rated as same. Hearing was rated as slightly worse. Patient feels that their emotional and mental health rating is same. Patients states they are never, rarely angry. Patient states they are often unusually tired/fatigued. Pain experienced in the last 7 days has been none. Patient states that he has experienced weight loss or gain in last 6 months.     Depression Screening:   PHQ-9 Score: 11      Fall Risk Screening:   In the past year, patient has experienced: history of falling in past year    Number of falls: 2 or more  Injured during fall?: No    Feels unsteady when standing or walking?: Yes    Worried about falling?: Yes      Home Safety:  Patient has trouble with stairs inside or outside of their home. Patient has working smoke alarms and has working carbon monoxide detector. Home safety hazards include: none.     Nutrition:   Current  diet is Regular.     Medications:   Patient is currently taking over-the-counter supplements. OTC medications include: see medication list. Patient is able to manage medications.     Activities of Daily Living (ADLs)/Instrumental Activities of Daily Living (IADLs):   Walk and transfer into and out of bed and chair?: Yes  Dress and groom yourself?: Yes    Bathe or shower yourself?: Yes    Feed yourself? Yes  Do your laundry/housekeeping?: Yes  Manage your money, pay your bills and track your expenses?: Yes  Make your own meals?: Yes    Do your own shopping?: Yes    Previous Hospitalizations:   Any hospitalizations or ED visits within the last 12 months?: No      PREVENTIVE SCREENINGS      Cardiovascular Screening:    General: Screening Current      Diabetes Screening:     General: Screening Not Indicated and History Diabetes      Colorectal Cancer Screening:     General: Patient Declines      Prostate Cancer Screening:    General: Screening Not Indicated      Abdominal Aortic Aneurysm (AAA) Screening:    Risk factors include: age between 65-74 yo and tobacco use        Lung Cancer Screening:     General: Risks and Benefits Discussed    Due for: Low Dose CT (LDCT)      Hepatitis C Screening:    General: Screening Current    Screening, Brief Intervention, and Referral to Treatment (SBIRT)    Screening  Typical number of drinks in a day: 2  Typical number of drinks in a week: 14  Interpretation: Low risk drinking behavior.    AUDIT-C Screenin) How often did you have a drink containing alcohol in the past year? 4 or more times a week  2) How many drinks did you have on a typical day when you were drinking in the past year? 1 to 2  3) How often did you have 6 or more drinks on one occasion in the past year? less than monthly    AUDIT-C Score: 5  Interpretation: Score 4-12 (male): POSITIVE screen for alcohol misuse    AUDIT Screenin) How often during the last year have you found that you were not able to stop  drinking once you had started? 0 - never  5) How often during the last year have you failed to do what was normally expected from you because of drinking? 0 - never  6) How often during the last year have you needed a first drink in the morning to get yourself going after a heavy drinking session? 0 - never  7) How often during the last year have you had a feeling of guilt or remorse after drinking? 0 - never  8) How often during the last year have you been unable to remember what happened the night before because you had been drinking? 0 - never  9) Have you or someone else been injured as a result of your drinking? 0 - no  10) Has a relative or friend or a doctor or another health worker been concerned about your drinking or suggested you cut down? 0 - no    AUDIT Score: 5  Interpretation: Low risk alcohol consumption    Single Item Drug Screening:  How often have you used an illegal drug (including marijuana) or a prescription medication for non-medical reasons in the past year? never    Single Item Drug Screen Score: 0  Interpretation: Negative screen for possible drug use disorder    Social Drivers of Health     Financial Resource Strain: Low Risk  (1/15/2024)    Overall Financial Resource Strain (CARDIA)     Difficulty of Paying Living Expenses: Not hard at all   Food Insecurity: No Food Insecurity (12/6/2021)    Hunger Vital Sign     Worried About Running Out of Food in the Last Year: Never true     Ran Out of Food in the Last Year: Never true   Transportation Needs: No Transportation Needs (1/15/2024)    PRAPARE - Transportation     Lack of Transportation (Medical): No     Lack of Transportation (Non-Medical): No   Housing Stability: Unknown (12/6/2021)    Housing Stability Vital Sign     Unable to Pay for Housing in the Last Year: No     Unstable Housing in the Last Year: No     No results found.    Objective   /60   Pulse 71   Temp (!) 95.3 °F (35.2 °C)   Wt 71.8 kg (158 lb 6.4 oz)   SpO2 97%    BMI 22.41 kg/m²     Physical Exam  Constitutional:       Appearance: Normal appearance.   HENT:      Head: Normocephalic.      Right Ear: External ear normal.      Left Ear: External ear normal.      Nose: Nose normal.      Mouth/Throat:      Mouth: Mucous membranes are moist.      Pharynx: Oropharynx is clear.   Eyes:      Conjunctiva/sclera: Conjunctivae normal.   Cardiovascular:      Rate and Rhythm: Normal rate and regular rhythm.      Heart sounds: Normal heart sounds.   Pulmonary:      Effort: Pulmonary effort is normal.      Breath sounds: Normal breath sounds.   Abdominal:      General: Bowel sounds are normal.      Palpations: Abdomen is soft.   Neurological:      Mental Status: He is alert and oriented to person, place, and time.   Psychiatric:         Behavior: Behavior normal.

## 2025-01-17 NOTE — ASSESSMENT & PLAN NOTE
Depression Screening Follow-up Plan: Patient's depression screening was positive with a PHQ-9 score of 11. Patient assessed for underlying major depression. They have no active suicidal ideations. Brief counseling provided and recommend additional follow-up/re-evaluation next office visit.

## 2025-01-17 NOTE — PATIENT INSTRUCTIONS
Medicare Preventive Visit Patient Instructions  Thank you for completing your Welcome to Medicare Visit or Medicare Annual Wellness Visit today. Your next wellness visit will be due in one year (1/18/2026).  The screening/preventive services that you may require over the next 5-10 years are detailed below. Some tests may not apply to you based off risk factors and/or age. Screening tests ordered at today's visit but not completed yet may show as past due. Also, please note that scanned in results may not display below.  Preventive Screenings:  Service Recommendations Previous Testing/Comments   Colorectal Cancer Screening  Colonoscopy    Fecal Occult Blood Test (FOBT)/Fecal Immunochemical Test (FIT)  Fecal DNA/Cologuard Test  Flexible Sigmoidoscopy Age: 45-75 years old   Colonoscopy: every 10 years (May be performed more frequently if at higher risk)  OR  FOBT/FIT: every 1 year  OR  Cologuard: every 3 years  OR  Sigmoidoscopy: every 5 years  Screening may be recommended earlier than age 45 if at higher risk for colorectal cancer. Also, an individualized decision between you and your healthcare provider will decide whether screening between the ages of 76-85 would be appropriate. Colonoscopy: Not on file  FOBT/FIT: Not on file  Cologuard: Not on file  Sigmoidoscopy: Not on file    Patient Declines     Prostate Cancer Screening Individualized decision between patient and health care provider in men between ages of 55-69   Medicare will cover every 12 months beginning on the day after your 50th birthday PSA: 0.7 ng/mL     Screening Not Indicated     Hepatitis C Screening Once for adults born between 1945 and 1965  More frequently in patients at high risk for Hepatitis C Hep C Antibody: 01/24/2022    Screening Current   Diabetes Screening 1-2 times per year if you're at risk for diabetes or have pre-diabetes Fasting glucose: 295 mg/dL (1/14/2025)  A1C: 10.1 % (1/14/2025)  Screening Not Indicated  History Diabetes    Cholesterol Screening Once every 5 years if you don't have a lipid disorder. May order more often based on risk factors. Lipid panel: 08/02/2024  Screening Current      Other Preventive Screenings Covered by Medicare:  Abdominal Aortic Aneurysm (AAA) Screening: covered once if your at risk. You're considered to be at risk if you have a family history of AAA or a male between the age of 65-75 who smoking at least 100 cigarettes in your lifetime.  Lung Cancer Screening: covers low dose CT scan once per year if you meet all of the following conditions: (1) Age 55-77; (2) No signs or symptoms of lung cancer; (3) Current smoker or have quit smoking within the last 15 years; (4) You have a tobacco smoking history of at least 20 pack years (packs per day x number of years you smoked); (5) You get a written order from a healthcare provider.  Glaucoma Screening: covered annually if you're considered high risk: (1) You have diabetes OR (2) Family history of glaucoma OR (3)  aged 50 and older OR (4)  American aged 65 and older  Osteoporosis Screening: covered every 2 years if you meet one of the following conditions: (1) Have a vertebral abnormality; (2) On glucocorticoid therapy for more than 3 months; (3) Have primary hyperparathyroidism; (4) On osteoporosis medications and need to assess response to drug therapy.  HIV Screening: covered annually if you're between the age of 15-65. Also covered annually if you are younger than 15 and older than 65 with risk factors for HIV infection. For pregnant patients, it is covered up to 3 times per pregnancy.    Immunizations:  Immunization Recommendations   Influenza Vaccine Annual influenza vaccination during flu season is recommended for all persons aged >= 6 months who do not have contraindications   Pneumococcal Vaccine   * Pneumococcal conjugate vaccine = PCV13 (Prevnar 13), PCV15 (Vaxneuvance), PCV20 (Prevnar 20)  * Pneumococcal polysaccharide vaccine =  PPSV23 (Pneumovax) Adults 19-65 yo with certain risk factors or if 65+ yo  If never received any pneumonia vaccine: recommend Prevnar 20 (PCV20)  Give PCV20 if previously received 1 dose of PCV13 or PPSV23   Hepatitis B Vaccine 3 dose series if at intermediate or high risk (ex: diabetes, end stage renal disease, liver disease)   Respiratory syncytial virus (RSV) Vaccine - COVERED BY MEDICARE PART D  * RSVPreF3 (Arexvy) CDC recommends that adults 60 years of age and older may receive a single dose of RSV vaccine using shared clinical decision-making (SCDM)   Tetanus (Td) Vaccine - COST NOT COVERED BY MEDICARE PART B Following completion of primary series, a booster dose should be given every 10 years to maintain immunity against tetanus. Td may also be given as tetanus wound prophylaxis.   Tdap Vaccine - COST NOT COVERED BY MEDICARE PART B Recommended at least once for all adults. For pregnant patients, recommended with each pregnancy.   Shingles Vaccine (Shingrix) - COST NOT COVERED BY MEDICARE PART B  2 shot series recommended in those 19 years and older who have or will have weakened immune systems or those 50 years and older     Health Maintenance Due:      Topic Date Due   • Lung Cancer Screening  08/01/2025 (Originally 12/6/2018)   • Colorectal Cancer Screening  08/01/2025 (Originally 11/14/1995)   • Hepatitis C Screening  Completed     Immunizations Due:      Topic Date Due   • Pneumococcal Vaccine: 65+ Years (1 of 2 - PCV) Never done   • Influenza Vaccine (1) 09/01/2024   • COVID-19 Vaccine (4 - 2024-25 season) 09/01/2024     Advance Directives   What are advance directives?  Advance directives are legal documents that state your wishes and plans for medical care. These plans are made ahead of time in case you lose your ability to make decisions for yourself. Advance directives can apply to any medical decision, such as the treatments you want, and if you want to donate organs.   What are the types of advance  directives?  There are many types of advance directives, and each state has rules about how to use them. You may choose a combination of any of the following:  Living will:  This is a written record of the treatment you want. You can also choose which treatments you do not want, which to limit, and which to stop at a certain time. This includes surgery, medicine, IV fluid, and tube feedings.   Durable power of  for healthcare (DPAHC):  This is a written record that states who you want to make healthcare choices for you when you are unable to make them for yourself. This person, called a proxy, is usually a family member or a friend. You may choose more than 1 proxy.  Do not resuscitate (DNR) order:  A DNR order is used in case your heart stops beating or you stop breathing. It is a request not to have certain forms of treatment, such as CPR. A DNR order may be included in other types of advance directives.  Medical directive:  This covers the care that you want if you are in a coma, near death, or unable to make decisions for yourself. You can list the treatments you want for each condition. Treatment may include pain medicine, surgery, blood transfusions, dialysis, IV or tube feedings, and a ventilator (breathing machine).  Values history:  This document has questions about your views, beliefs, and how you feel and think about life. This information can help others choose the care that you would choose.  Why are advance directives important?  An advance directive helps you control your care. Although spoken wishes may be used, it is better to have your wishes written down. Spoken wishes can be misunderstood, or not followed. Treatments may be given even if you do not want them. An advance directive may make it easier for your family to make difficult choices about your care.   Fall Prevention    Fall prevention  includes ways to make your home and other areas safer. It also includes ways you can move more  "carefully to prevent a fall. Health conditions that cause changes in your blood pressure, vision, or muscle strength and coordination may increase your risk for falls. Medicines may also increase your risk for falls if they make you dizzy, weak, or sleepy.   Fall prevention tips:   Stand or sit up slowly.    Use assistive devices as directed.    Wear shoes that fit well and have soles that .    Wear a personal alarm.    Stay active.    Manage your medical conditions.    Home Safety Tips:  Add items to prevent falls in the bathroom.    Keep paths clear.    Install bright lights in your home.    Keep items you use often on shelves within reach.    Paint or place reflective tape on the edges of your stairs.    Alcohol Use and Your Health    Drinking too much can harm your health.  Excessive alcohol use leads to about 88,000 death in the [place] each year, and shortens the life of those who diet by almost 30 years.  Further, excessive drinking cost the economy $249 billion in 2010.  Most excessive drinkers are not alcohol dependent.    Excessive alcohol use has immediate effects that increase the risk of many harmful health conditions.  These are most often the result of binge drinking.  Over time, excessive alcohol use can lead to the development of chronic diseases and other series health problems.    What is considered a \"drink\"?        Excessive alcohol use includes:  Binge Drinking: For women, 4 or more drinks consumed on one occasion. For men, 5 or more drinks consumed on one occasion.  Heavy Drinking: For women, 8 or more drinks per week. For men, 15 or more drinks per week  Any alcohol used by pregnant women  Any alcohol used by those under the age of 21 years    If you choose to drink, do so in moderation:  Do not drink at all if you are under the age of 21, or if you are or may be pregnant, or have health problems that could be made worse by drinking.  For women, up to 1 drink per day  For men, up to " 2 drinks a day    No one should begin drinking or drink more frequently based on potential health benefits    Short-Term Health Risks:  Injuries: motor vehicle crashes, falls, drownings, burns  Violence: homicide, suicide, sexual assault, intimate partner violence  Alcohol poisoning  Reproductive health: risky sexual behaviors, unintended prengnacy, sexually transmitted diseases, miscarriage, stillbirth, fetal alcohol syndrome    Long-Term Health Risks:  Chronic diseases: high blood pressure, heart disease, stroke, liver disease, digestive problems  Cancers: breast, mouth and throat, liver, colon  Learning and memory problems: dementia, poor school performance  Mental health: depression, anxiety, insomnia  Social problems: lost productivity, family problems, unemployment  Alcohol dependence    For support and more information:  Substance Abuse and Mental Health Services Administration  [address]  [place] [address]  Web Address: http://www.samhsa.gov    Alcoholics Anonymous        Web Address: http://www.aa.org    https://www.cdc.gov/alcohol/fact-sheets/alcohol-use.htm     © Copyright [employer] 2018 Information is for End User's use only and may not be sold, redistributed or otherwise used for commercial purposes. All illustrations and images included in CareNotes® are the copyrighted property of A.D.A.M., Inc. or [employer]

## 2025-01-29 ENCOUNTER — HOSPITAL ENCOUNTER (OUTPATIENT)
Dept: CT IMAGING | Facility: HOSPITAL | Age: 75
Discharge: HOME | End: 2025-01-29

## 2025-01-29 DIAGNOSIS — Z87.891 PERSONAL HISTORY OF NICOTINE DEPENDENCE: ICD-10-CM

## 2025-01-29 DIAGNOSIS — Z12.2 SCREENING FOR LUNG CANCER: ICD-10-CM

## 2025-02-04 ENCOUNTER — RESULTS FOLLOW-UP (OUTPATIENT)
Dept: FAMILY MEDICINE CLINIC | Facility: CLINIC | Age: 75
End: 2025-02-04

## 2025-03-12 ENCOUNTER — TELEPHONE (OUTPATIENT)
Age: 75
End: 2025-03-12

## 2025-03-12 DIAGNOSIS — R26.89 LOSS OF BALANCE: Primary | ICD-10-CM

## 2025-03-12 NOTE — TELEPHONE ENCOUNTER
Patient is requesting PT referral to [facility]. Patient reports he keeps losing his balance. He feel again yesterday but no injuries reported. Hit back and right hip. States he's sore but okay. Please advise.

## 2025-03-13 NOTE — TELEPHONE ENCOUNTER
Called patient to inform him that PT referral is placed. Patient is aware and will call us if he needs it faxed to another facility.

## 2025-03-25 ENCOUNTER — EVALUATION (OUTPATIENT)
Dept: PHYSICAL THERAPY | Facility: CLINIC | Age: 75
End: 2025-03-25
Payer: COMMERCIAL

## 2025-03-25 DIAGNOSIS — R26.89 LOSS OF BALANCE: ICD-10-CM

## 2025-03-25 DIAGNOSIS — R53.1 WEAKNESS: Primary | ICD-10-CM

## 2025-03-25 PROCEDURE — 97161 PT EVAL LOW COMPLEX 20 MIN: CPT | Performed by: PHYSICAL THERAPIST

## 2025-03-25 PROCEDURE — 97110 THERAPEUTIC EXERCISES: CPT | Performed by: PHYSICAL THERAPIST

## 2025-03-25 PROCEDURE — 97535 SELF CARE MNGMENT TRAINING: CPT | Performed by: PHYSICAL THERAPIST

## 2025-03-25 NOTE — PROGRESS NOTES
PT Evaluation     Today's date: 3/25/2025  Patient name: Rick Power  : 1950  MRN: [id number]  Referring provider: Darvin Keith PA-C  Dx:   Encounter Diagnosis     ICD-10-CM    1. Weakness  R53.1       2. Loss of balance  R26.89 Ambulatory Referral to Physical Therapy          Start Time: 0900  Stop Time: 1000  Total time in clinic (min): 60 minutes    Assessment  Impairments: abnormal or restricted ROM, impaired balance, impaired physical strength and lacks appropriate home exercise program  Other impairment: Frequent falls    Assessment details: The patient is a 73 yo male who presents to physical therapy with signs and symptoms consistent with LLE weakness resulting in frequent falls. He notes a several year history of L leg weakness. He falls several times a week. He has difficulty stepping in and out of the shower. He is unable to lift or carry things without losing his balance. Examination reveals deficits in L LE strength and stability, core stability, balance and functional tolerance. His 5x sit to stand test was >30 seconds(norm for his age is 12.6 sec)He would benefit from a course of skilled physical therapy to address deficits in ROM, strength, stability and functional status.      Understanding of Dx/Px/POC: good     Prognosis: fair    Goals  STG(4 weeks):            1. Independent with HEP            2. Patient will remain free of recent falls            3. Patient will ambulate community distances with SPC and no LOB            4. Increase strength by 1/2 MMT grade     LTG(8 weeks):              1. Patient will remain free of falls             2. Increase LE strength to 4+ to 5/5             3. Patient will maintain tandem balance for 15 seconds              4. Improve 5x STS to <15 seconds             4. Return to PLOF       Plan  Patient would benefit from: skilled physical therapy    Planned therapy interventions: abdominal trunk stabilization, balance, neuromuscular  re-education, strengthening, stretching, therapeutic activities, therapeutic exercise and home exercise program    Frequency: 2x week  Duration in weeks: 8  Plan of Care beginning date: 3/25/2025  Plan of Care expiration date: 2025  Treatment plan discussed with: patient        Subjective Evaluation    History of Present Illness  Mechanism of injury: The patient states that he has no strength in his L leg and it gives out and he falls several times/week. He is able to get up by himself. He had a stent placed in the R leg due to circulatory issues and that helped with R leg strength.  He describes occasional pain in the L leg. He uses a cream for neuropathic pain in his feet. He has a cane but rarely uses it. He notes that if he tries to walk while carrying anything in his L hand, he falls over. He has difficulty stepping in and out of his shower. He is contacting the [employer] aging to get assistance with laundry and ADLs. He is now referred to OPPT.           Recurrent probem    Quality of life: fair    Patient Goals  Patient goal: Strengthen up my L leg so I don't fall  Pain  Current pain ratin  At best pain ratin  At worst pain rating: 3  Location: L thigh and lower leg  Quality: dull ache    Social Support  Steps to enter house: yes  Stairs in house: no   Lives in: one-story house  Lives with: alone    Employment status: not working        Objective     Concurrent Complaints  Positive for peripheral neuropathy. Negative for nausea/motion sickness, tinnitus, visual change, hearing loss, memory loss, aural fullness and poor concentration    Strength/Myotome Testing     Left Hip   Planes of Motion   Flexion: 4  Abduction: 4    Right Hip   Planes of Motion   Flexion: 4+  Abduction: 4+    Left Knee   Flexion: 4  Extension: 4    Right Knee   Flexion: 4+  Extension: 4+    Left Ankle/Foot   Dorsiflexion: 4+  Plantar flexion: 4  Inversion: 4-  Eversion: 4-    Right Ankle/Foot   Dorsiflexion: 5  Plantar  "flexion: 4+  Neuro Exam:     Dizziness  Positive for vertigo.   Negative for disequilibrium, oscillopsia, motion sickness, light-headedness, rocking or swaying, diplopia and floating or swimming.     Positional testing   Positional testing comment: Patient unable to maintain balance in a NBOS    Sensation   Light touch LE: left WNL and right WNL    Functional outcomes   5x sit to stand: 30.4 (seconds)  Functional outcome comment: Patient requires UE assist for sit to stand. (Norm is 12.6 sec; >15 sec indicates high fall risk)  Functional outcome gait comment: Patient has SPC but rarely uses it      Balance assessments   MCTSIB   Eyes open level surface: 30 sec  Eyes closed level surface: 30 sec             Precautions: Fall risk  HEP issued. Diagnosis, prognosis and PT POC discussed with patient       3/25            Manuals                                                    Neuro Re-Ed             Tandem balance                                                                                           Ther Ex             Seated hip flex 10x LLE            LAQ 5\"x10 LLE            Calf stretch 15\"x5 w/ strap in sitting            Standing hip abd,ext,march             HR/TR             Iso hip add             Clam             Bridging             SLR             NuStep for LE ms endurance and strength             Ther Activity             Step ups F/L             Side stepping             STS             Modalities                                            "

## 2025-03-27 ENCOUNTER — OFFICE VISIT (OUTPATIENT)
Dept: PHYSICAL THERAPY | Facility: CLINIC | Age: 75
End: 2025-03-27
Payer: COMMERCIAL

## 2025-03-27 DIAGNOSIS — R53.1 WEAKNESS: ICD-10-CM

## 2025-03-27 DIAGNOSIS — R26.89 LOSS OF BALANCE: Primary | ICD-10-CM

## 2025-03-27 PROCEDURE — 97110 THERAPEUTIC EXERCISES: CPT

## 2025-03-27 PROCEDURE — 97112 NEUROMUSCULAR REEDUCATION: CPT

## 2025-03-27 PROCEDURE — 97530 THERAPEUTIC ACTIVITIES: CPT

## 2025-03-27 NOTE — PROGRESS NOTES
"Daily Note     Today's date: 3/27/2025  Patient name: Rick Power  : 1950  MRN: [id number]  Referring provider: Darvin Keith PA-C  Dx:   Encounter Diagnosis     ICD-10-CM    1. Loss of balance  R26.89       2. Weakness  R53.1                      Subjective: Patient reports that he falls at least once per week. He feels his knees buckle on him when this happens. He has been compliant with HEP since his initial evaluation.       Objective: See treatment diary below.       Assessment: Tolerated treatment well. Patient demonstrated fatigue post treatment and would benefit from continued PT to improve BLE strength and balance to reduce falls and perform functional activities. He was able to tolerate program today with proper muscular fatigue. He has BLE weakness in all planes of motion, LLE>RLE. Progress to tolerance.      Plan: Continue per plan of care.      Precautions: Fall risk  HEP issued. Diagnosis, prognosis and PT POC discussed with patient       3/25 3/27       Manuals                                    Neuro Re-Ed         Tandem balance  On firm 30\"x3       NBOS  On AE 30\"x3                                                    Ther Ex         Seated hip flex 10x LLE BLE 10x        LAQ 5\"x10 LLE BLE 5\" holds 10x        Calf stretch 15\"x5 w/ strap in sitting 30\"x3 w/ strap sitting LLE       Standing hip abd,ext,march         Seated HR  15x       Seated TR  15x       Iso hip add  5\"x10       Clam  L2 2x10 in HL       Bridging  5\" holds 10x       SLR  BLE 2x10       NuStep for LE ms endurance and strength  L5 10'       Ther Activity         Step ups F/L  6\" 10x       Side stepping         STS  Chair + AE 10x       Modalities                                "

## 2025-03-31 ENCOUNTER — OFFICE VISIT (OUTPATIENT)
Dept: PHYSICAL THERAPY | Facility: CLINIC | Age: 75
End: 2025-03-31
Payer: COMMERCIAL

## 2025-03-31 DIAGNOSIS — R53.1 WEAKNESS: ICD-10-CM

## 2025-03-31 DIAGNOSIS — R26.89 LOSS OF BALANCE: Primary | ICD-10-CM

## 2025-03-31 PROCEDURE — 97110 THERAPEUTIC EXERCISES: CPT | Performed by: PHYSICAL THERAPIST

## 2025-03-31 PROCEDURE — 97112 NEUROMUSCULAR REEDUCATION: CPT | Performed by: PHYSICAL THERAPIST

## 2025-03-31 NOTE — PROGRESS NOTES
"Daily Note     Today's date: 3/31/2025  Patient name: Rick Power  : 1950  MRN: [id number]  Referring provider: Darvin Keith PA-C  Dx:   Encounter Diagnosis     ICD-10-CM    1. Loss of balance  R26.89       2. Weakness  R53.1           Start Time: 0730  Stop Time: 0815  Total time in clinic (min): 45 minutes    Subjective: The patient states that he typically falls every day because his L leg gives out.       Objective: See treatment diary below      Assessment: Patient requires frequent encouragement to complete prescribed repetitions of exercises. He commonly states \"I can't. My leg is weak.\"Tolerated treatment well. Patient would benefit from continued PT      Plan: Continue per plan of care.      Precautions: Fall risk  HEP issued. Diagnosis, prognosis and PT POC discussed with patient       3/25 3/27 3/31      Manuals                                    Neuro Re-Ed         Tandem balance  On firm 30\"x3 On firm 30\"x2      NBOS  On AE 30\"x3 On AE 30\"x2                                                   Ther Ex         Seated hip flex 10x LLE BLE 10x  BLE 2x10      LAQ 5\"x10 LLE BLE 5\" holds 10x  BLE 5\" holds 2x10      Calf stretch 15\"x5 w/ strap in sitting 30\"x3 w/ strap sitting LLE 30\"x3 w/ strap in sitting  BLE      Standing hip abd,ext,march         Seated HR  15x 20x      Seated TR  15x 20x      Iso hip add  5\"x10 5\"hold 2x10      Clam  L2 2x10 in HL L2 2x10 in HL      Bridging  5\" holds 10x 5\" hold 2x10      SLR  BLE 2x10 BLE 2x10      NuStep for LE ms endurance and strength  L5 10' L5 10'      Ther Activity         Step ups F/L  6\" 10x 6\" 10x       Side stepping   5x //bars      STS  Chair + AE 10x Chair+AE 10x      Modalities                                  "

## 2025-04-03 ENCOUNTER — OFFICE VISIT (OUTPATIENT)
Dept: PHYSICAL THERAPY | Facility: CLINIC | Age: 75
End: 2025-04-03
Payer: COMMERCIAL

## 2025-04-03 DIAGNOSIS — R26.89 LOSS OF BALANCE: Primary | ICD-10-CM

## 2025-04-03 DIAGNOSIS — R53.1 WEAKNESS: ICD-10-CM

## 2025-04-03 PROCEDURE — 97530 THERAPEUTIC ACTIVITIES: CPT

## 2025-04-03 PROCEDURE — 97112 NEUROMUSCULAR REEDUCATION: CPT

## 2025-04-03 PROCEDURE — 97110 THERAPEUTIC EXERCISES: CPT

## 2025-04-03 NOTE — PROGRESS NOTES
"Daily Note     Today's date: 4/3/2025  Patient name: Rick Power  : 1950  MRN: [id number]  Referring provider: Darvin Keith PA-C  Dx:   Encounter Diagnosis     ICD-10-CM    1. Loss of balance  R26.89       2. Weakness  R53.1                      Subjective: Patient reports that his BLE still has weakness and he has soreness in his BLE. He did not fall since the start of therapy.      Objective: See treatment diary below. Educated patient on DOMS and strengthening progressions.      Assessment: Tolerated treatment well. Patient demonstrated fatigue post treatment and would benefit from continued PT to improve BLE strength and balance to reduce falls and perform functional activities. He was able to tolerate more reps with step ups today. Progress to tolerance.       Plan: Continue per plan of care.      Precautions: Fall risk  HEP issued. Diagnosis, prognosis and PT POC discussed with patient       3/25 3/27 3/31 4/3     Manuals                                    Neuro Re-Ed         Tandem balance  On firm 30\"x3 On firm 30\"x2 On firm 30\"x2     NBOS  On AE 30\"x3 On AE 30\"x2 On AE 30\"x2                                                  Ther Ex         Seated hip flex 10x LLE BLE 10x  BLE 2x10 BLE 2x10     LAQ 5\"x10 LLE BLE 5\" holds 10x  BLE 5\" holds 2x10 BLE 5\" holds 2x10     Calf stretch 15\"x5 w/ strap in sitting 30\"x3 w/ strap sitting LLE 30\"x3 w/ strap in sitting  BLE 30\"x3 w/ strap in sitting  BLE     Standing hip abd,ext,march         Seated HR  15x 20x 20x     Seated TR  15x 20x 20x     Iso hip add  5\"x10 5\"hold 2x10 5\" holds 2x10     Clam  L2 2x10 in HL L2 2x10 in HL L2 2x10 HL     Bridging  5\" holds 10x 5\" hold 2x10 5\" hold 2x10     SLR  BLE 2x10 BLE 2x10 BLE 2x10     NuStep for LE ms endurance and strength  L5 10' L5 10' L5 10'     Ther Activity         Step ups F/L  6\" 10x 6\" 10x  6\" 2x10     Side stepping   5x //bars 5x// bars     STS  Chair + AE 10x Chair+AE 10x Chair +AE 10x   "   Modalities

## 2025-04-07 ENCOUNTER — OFFICE VISIT (OUTPATIENT)
Dept: PHYSICAL THERAPY | Facility: CLINIC | Age: 75
End: 2025-04-07
Payer: COMMERCIAL

## 2025-04-07 DIAGNOSIS — R26.89 LOSS OF BALANCE: Primary | ICD-10-CM

## 2025-04-07 DIAGNOSIS — R53.1 WEAKNESS: ICD-10-CM

## 2025-04-07 PROCEDURE — 97112 NEUROMUSCULAR REEDUCATION: CPT | Performed by: PHYSICAL THERAPIST

## 2025-04-07 PROCEDURE — 97110 THERAPEUTIC EXERCISES: CPT | Performed by: PHYSICAL THERAPIST

## 2025-04-07 PROCEDURE — 97530 THERAPEUTIC ACTIVITIES: CPT | Performed by: PHYSICAL THERAPIST

## 2025-04-07 NOTE — PROGRESS NOTES
"Daily Note     Today's date: 2025  Patient name: Rick Power  : 1950  MRN: [id number]  Referring provider: Darvin Keith PA-C  Dx:   Encounter Diagnosis     ICD-10-CM    1. Loss of balance  R26.89       2. Weakness  R53.1           Start Time: 0730  Stop Time: 0815  Total time in clinic (min): 45 minutes    Subjective: The patient reports pain in his L hip and knee today.       Objective: See treatment diary below      Assessment: The patient requires frequent encouragement to complete exercises. He often states \" I can't\". He was able to complete 3 reps of sit to stand independently. Min A required with fatigue. Tolerated treatment well. Patient would benefit from continued PT      Plan: Continue per plan of care.      Precautions: Fall risk  HEP issued. Diagnosis, prognosis and PT POC discussed with patient       3/25 3/27 3/31 4/3 4/7    Manuals                                    Neuro Re-Ed         Tandem balance  On firm 30\"x3 On firm 30\"x2 On firm 30\"x2 On firm 30\"x2    NBOS  On AE 30\"x3 On AE 30\"x2 On AE 30\"x2 On AE 30\"x2                                                 Ther Ex         Seated hip flex 10x LLE BLE 10x  BLE 2x10 BLE 2x10 BLE 2x10    LAQ 5\"x10 LLE BLE 5\" holds 10x  BLE 5\" holds 2x10 BLE 5\" holds 2x10 BLE 5\" holds 2x10    Calf stretch 15\"x5 w/ strap in sitting 30\"x3 w/ strap sitting LLE 30\"x3 w/ strap in sitting  BLE 30\"x3 w/ strap in sitting  BLE 30\"x3 w/ strap in sitting BLE 1/2 roll   Standing hip abd,ext,march     2x10 ea BLE    Seated HR  15x 20x 20x 10x in standing    Seated TR  15x 20x 20x 10x in standing    Iso hip add  5\"x10 5\"hold 2x10 5\" holds 2x10 5\" holds 20x in HL    Clam  L2 2x10 in HL L2 2x10 in HL L2 2x10 HL L2 2x10 5\" hold    Bridging  5\" holds 10x 5\" hold 2x10 5\" hold 2x10 5\" hold 2x10    SLR  BLE 2x10 BLE 2x10 BLE 2x10 BLE 2x10    NuStep for LE ms endurance and strength  L5 10' L5 10' L5 10' L5 10'    Ther Activity         Step ups F/L  6\" 10x 6\" 10x  6\" " "2x10 6\" 2x10    Side stepping   5x //bars 5x// bars 5x //bars    STS  Chair + AE 10x Chair+AE 10x Chair +AE 10x Chair+AE 10x    Modalities                                      "

## 2025-04-10 ENCOUNTER — OFFICE VISIT (OUTPATIENT)
Dept: PHYSICAL THERAPY | Facility: CLINIC | Age: 75
End: 2025-04-10
Payer: COMMERCIAL

## 2025-04-10 DIAGNOSIS — R26.89 LOSS OF BALANCE: Primary | ICD-10-CM

## 2025-04-10 DIAGNOSIS — R53.1 WEAKNESS: ICD-10-CM

## 2025-04-10 PROCEDURE — 97110 THERAPEUTIC EXERCISES: CPT | Performed by: PHYSICAL THERAPIST

## 2025-04-10 PROCEDURE — 97530 THERAPEUTIC ACTIVITIES: CPT | Performed by: PHYSICAL THERAPIST

## 2025-04-10 PROCEDURE — 97112 NEUROMUSCULAR REEDUCATION: CPT | Performed by: PHYSICAL THERAPIST

## 2025-04-10 NOTE — PROGRESS NOTES
"PT Re-Evaluation  and PT Discharge    Today's date: 4/10/2025  Patient name: Rick Power  : 1950  MRN: [id number]  Referring provider: Darvin Keith PA-C  Dx:   Encounter Diagnosis     ICD-10-CM    1. Loss of balance  R26.89       2. Weakness  R53.1           Start Time: 0730  Stop Time: 0815  Total time in clinic (min): 45 minutes    Assessment  Impairments: abnormal or restricted ROM, impaired balance, impaired physical strength and lacks appropriate home exercise program  Other impairment: Frequent falls    Assessment details: The patient has attended 6 PT visits. He requests discharge today stating \"it won't help\". He fell yesterday while walking up steps and fell onto his knees. He states that his rib are sore. Attempts were made to educate patient on timeline for strength gains. He declines further PT at this time. Deficits remain in strength, balance, stability and functional status. He remains a high fall risk.     Goals  STG(4 weeks):  NOT MET DUE TO SELF DISCHARGE            1. Independent with HEP            2. Patient will remain free of recent falls            3. Patient will ambulate community distances with SPC and no LOB            4. Increase strength by 1/2 MMT grade     LTG(8 weeks): NOT MET DUE TO SELF DISCHARGE             1. Patient will remain free of falls             2. Increase LE strength to 4+ to 5/5             3. Patient will maintain tandem balance for 15 seconds              4. Improve 5x STS to <15 seconds             4. Return to PLOF       Plan    Treatment plan discussed with: patient  Plan details: D/C as per patient request        Subjective Evaluation    History of Present Illness  Mechanism of injury: The patient states that he has no strength in his L leg and it gives out and he falls several times/week. He is able to get up by himself. He had a stent placed in the R leg due to circulatory issues and that helped with R leg strength.  He describes occasional " "pain in the L leg. He uses a cream for neuropathic pain in his feet. He has a cane but rarely uses it. He notes that if he tries to walk while carrying anything in his L hand, he falls over. He has difficulty stepping in and out of his shower. He is contacting the [employer] aging to get assistance with laundry and ADLs. He is now referred to OPPT.     UPDATE: The patient states tat he fell yesterday while walking up his deck steps. He landed on his knees and hurt his ribs. He requests discharge from PT stating \"it's not helping\". He has an appointment with his PCP next week.  Patient Goals  Patient goal: Strengthen up my L leg so I don't fall  Social Support  Steps to enter house: yes  Stairs in house: no   Lives in: one-story house  Lives with: alone    Employment status: not working        Objective     Concurrent Complaints  Positive for peripheral neuropathy. Negative for nausea/motion sickness, tinnitus, visual change, hearing loss, memory loss, aural fullness and poor concentration    Strength/Myotome Testing     Left Hip   Planes of Motion   Flexion: 4  Abduction: 4    Right Hip   Planes of Motion   Flexion: 4+  Abduction: 4+    Left Knee   Flexion: 4  Extension: 4    Right Knee   Flexion: 4+  Extension: 4+    Left Ankle/Foot   Dorsiflexion: 4+  Plantar flexion: 4  Inversion: 4-  Eversion: 4-    Right Ankle/Foot   Dorsiflexion: 5  Plantar flexion: 4+  Neuro Exam:     Dizziness  Positive for vertigo.   Negative for disequilibrium, oscillopsia, motion sickness, light-headedness, rocking or swaying, diplopia and floating or swimming.     Positional testing   Positional testing comment: Patient unable to maintain balance in a NBOS    Sensation   Light touch LE: left WNL and right WNL    Functional outcomes   5x sit to stand: 30.4 (seconds)  Functional outcome comment: Patient requires UE assist for sit to stand. (Norm is 12.6 sec; >15 sec indicates high fall risk)  Functional outcome gait comment: Patient has SPC " "but rarely uses it      Balance assessments   MCTSIB   Eyes open level surface: 30 sec  Eyes closed level surface: 30 sec             Precautions: Fall risk  HEP issued. Diagnosis, prognosis and PT POC discussed with patient       3/25 3/27 3/31 4/3 4/7 4/10   Manuals                                    Neuro Re-Ed         Tandem balance  On firm 30\"x3 On firm 30\"x2 On firm 30\"x2 On firm 30\"x2 30\"x2   NBOS  On AE 30\"x3 On AE 30\"x2 On AE 30\"x2 On AE 30\"x2                                                 Ther Ex         Seated hip flex 10x LLE BLE 10x  BLE 2x10 BLE 2x10 BLE 2x10 BLE 2x10   LAQ 5\"x10 LLE BLE 5\" holds 10x  BLE 5\" holds 2x10 BLE 5\" holds 2x10 BLE 5\" holds 2x10 BLE 5\" holds 2x10   Calf stretch 15\"x5 w/ strap in sitting 30\"x3 w/ strap sitting LLE 30\"x3 w/ strap in sitting  BLE 30\"x3 w/ strap in sitting  BLE 30\"x3 w/ strap in sitting BLE 30\"x3 w/ strap in sitting BLE   Standing hip abd,ext,march     2x10 ea BLE 2x10 ea BLE   Seated HR  15x 20x 20x 10x in standing 10x in standing   Seated TR  15x 20x 20x 10x in standing 10x in standing   Iso hip add  5\"x10 5\"hold 2x10 5\" holds 2x10 5\" holds 20x in HL 5\" hold 20x in HL   Clam  L2 2x10 in HL L2 2x10 in HL L2 2x10 HL L2 2x10 5\" hold L2 2x10 5\" hold   Bridging  5\" holds 10x 5\" hold 2x10 5\" hold 2x10 5\" hold 2x10 5\" hold 2x10   SLR  BLE 2x10 BLE 2x10 BLE 2x10 BLE 2x10 BLE 2x10   NuStep for LE ms endurance and strength  L5 10' L5 10' L5 10' L5 10' L5 10'   Ther Activity         Step ups F/L  6\" 10x 6\" 10x  6\" 2x10 6\" 2x10 6\" 2x10   Side stepping   5x //bars 5x// bars 5x //bars 5x //bars   STS  Chair + AE 10x Chair+AE 10x Chair +AE 10x Chair+AE 10x Chair+AE 10x   Modalities                                "

## 2025-04-11 ENCOUNTER — RA CDI HCC (OUTPATIENT)
Dept: OTHER | Facility: HOSPITAL | Age: 75
End: 2025-04-11

## 2025-04-14 ENCOUNTER — APPOINTMENT (OUTPATIENT)
Dept: PHYSICAL THERAPY | Facility: CLINIC | Age: 75
End: 2025-04-14
Payer: COMMERCIAL

## 2025-04-17 ENCOUNTER — APPOINTMENT (OUTPATIENT)
Dept: PHYSICAL THERAPY | Facility: CLINIC | Age: 75
End: 2025-04-17
Payer: COMMERCIAL

## 2025-04-18 ENCOUNTER — TELEPHONE (OUTPATIENT)
Dept: FAMILY MEDICINE CLINIC | Facility: CLINIC | Age: 75
End: 2025-04-18

## 2025-04-18 NOTE — TELEPHONE ENCOUNTER
Called patient to reschedule appointment today. No answer first attempt. Second attempt I reached patinet he was walking into the office 7:20 am.    I explained the Provider is out sick. Rescheduled for 5-1-25. Pt was understanding.

## 2025-05-02 ENCOUNTER — OFFICE VISIT (OUTPATIENT)
Dept: FAMILY MEDICINE CLINIC | Facility: CLINIC | Age: 75
End: 2025-05-02
Payer: COMMERCIAL

## 2025-05-02 VITALS
WEIGHT: 150 LBS | SYSTOLIC BLOOD PRESSURE: 140 MMHG | TEMPERATURE: 97.2 F | HEIGHT: 71 IN | DIASTOLIC BLOOD PRESSURE: 52 MMHG | HEART RATE: 99 BPM | OXYGEN SATURATION: 99 % | BODY MASS INDEX: 21 KG/M2

## 2025-05-02 DIAGNOSIS — E11.42 DIABETIC POLYNEUROPATHY ASSOCIATED WITH TYPE 2 DIABETES MELLITUS (HCC): ICD-10-CM

## 2025-05-02 PROBLEM — G61.81 CIDP (CHRONIC INFLAMMATORY DEMYELINATING POLYNEUROPATHY) (HCC): Status: RESOLVED | Noted: 2018-05-17 | Resolved: 2025-05-02

## 2025-05-02 LAB — SL AMB POCT HEMOGLOBIN AIC: 10.3 (ref ?–6.5)

## 2025-05-02 PROCEDURE — 99214 OFFICE O/P EST MOD 30 MIN: CPT | Performed by: PHYSICIAN ASSISTANT

## 2025-05-02 PROCEDURE — 83036 HEMOGLOBIN GLYCOSYLATED A1C: CPT | Performed by: PHYSICIAN ASSISTANT

## 2025-05-02 PROCEDURE — G2211 COMPLEX E/M VISIT ADD ON: HCPCS | Performed by: PHYSICIAN ASSISTANT

## 2025-05-02 NOTE — PROGRESS NOTES
Name: Rick Power      : 1950      MRN: [id number]  Encounter Provider: Darvin Keith PA-C  Encounter Date: 2025   Encounter department: [facility] PRIMARY C[facility]  :  Assessment & Plan  Diabetic polyneuropathy associated with type 2 diabetes mellitus (HCC)     - presents for DM recheck, A1c worsening from 10.1 to 10.3 in office today    - Patient currently on jardiance, januvia both maxed out QD    - Admits to occasional missed dose    - Importance of daily compliance with medication emphasized, discussed different additional treatment options including referral to endo for CGM, insulin pump and patient declines at this time    - Risks of uncontrolled DM discussed, will continue w/ prescribed dual therapy and continued lifestyle modification  Lab Results   Component Value Date    HGBA1C 10.3 (A) 2025       Orders:    Empagliflozin (Jardiance) 25 MG TABS; Take 1 tablet (25 mg total) by mouth every morning    sitaGLIPtin (Januvia) 100 mg tablet; Take 1 tablet (100 mg total) by mouth daily    POCT hemoglobin A1c           History of Present Illness   Diabetes  He presents for his follow-up diabetic visit. He has type 2 diabetes mellitus. His disease course has been worsening. There are no hypoglycemic associated symptoms. Pertinent negatives for hypoglycemia include no seizures. There are no diabetic associated symptoms. Pertinent negatives for diabetes include no chest pain. There are no hypoglycemic complications. Diabetic complications include peripheral neuropathy. Pertinent negatives for diabetic complications include no nephropathy, PVD or retinopathy. Risk factors for coronary artery disease include diabetes mellitus, male sex and hypertension. Current diabetic treatment includes oral agent (dual therapy). He is compliant with treatment most of the time. He is following a generally healthy diet. When asked about meal planning, he reported none. He has not had a previous visit  "with a dietitian. He participates in exercise intermittently. An ACE inhibitor/angiotensin II receptor blocker is not being taken. He does not see a podiatrist.Eye exam is not current.     Review of Systems   Constitutional:  Negative for chills and fever.   HENT:  Negative for ear pain and sore throat.    Eyes:  Negative for pain and visual disturbance.   Respiratory:  Negative for cough and shortness of breath.    Cardiovascular:  Negative for chest pain and palpitations.   Gastrointestinal:  Negative for abdominal pain and vomiting.   Genitourinary:  Negative for dysuria and hematuria.   Musculoskeletal:  Negative for arthralgias and back pain.   Skin:  Negative for color change and rash.   Neurological:  Negative for seizures and syncope.   All other systems reviewed and are negative.      Objective   /52 (BP Location: Left arm, Patient Position: Sitting, Cuff Size: Adult)   Pulse 99   Temp (!) 97.2 °F (36.2 °C) (Tympanic)   Ht 5' 10.5\" (1.791 m)   Wt 68 kg (150 lb)   SpO2 99%   BMI 21.22 kg/m²      Physical Exam  Constitutional:       Appearance: Normal appearance.   HENT:      Head: Normocephalic.      Right Ear: External ear normal.      Left Ear: External ear normal.      Nose: Nose normal.      Mouth/Throat:      Mouth: Mucous membranes are moist.      Pharynx: Oropharynx is clear.   Eyes:      Conjunctiva/sclera: Conjunctivae normal.   Cardiovascular:      Rate and Rhythm: Normal rate and regular rhythm.      Heart sounds: Normal heart sounds.   Pulmonary:      Effort: Pulmonary effort is normal.      Breath sounds: Normal breath sounds.   Abdominal:      General: Bowel sounds are normal.      Palpations: Abdomen is soft.   Neurological:      Mental Status: He is alert and oriented to person, place, and time.   Psychiatric:         Behavior: Behavior normal.         "

## 2025-05-02 NOTE — ASSESSMENT & PLAN NOTE
- presents for DM recheck, A1c worsening from 10.1 to 10.3 in office today    - Patient currently on jardiance, januvia both maxed out QD    - Admits to occasional missed dose    - Importance of daily compliance with medication emphasized, discussed different additional treatment options including referral to endo for CGM, insulin pump and patient declines at this time    - Risks of uncontrolled DM discussed, will continue w/ prescribed dual therapy and continued lifestyle modification  Lab Results   Component Value Date    HGBA1C 10.3 (A) 05/02/2025       Orders:    Empagliflozin (Jardiance) 25 MG TABS; Take 1 tablet (25 mg total) by mouth every morning    sitaGLIPtin (Januvia) 100 mg tablet; Take 1 tablet (100 mg total) by mouth daily    POCT hemoglobin A1c

## 2025-06-27 ENCOUNTER — HOSPITAL ENCOUNTER (INPATIENT)
Facility: HOSPITAL | Age: 75
LOS: 3 days | Discharge: HOME | DRG: 253 | End: 2025-06-30
Attending: SURGERY | Admitting: SURGERY
Payer: COMMERCIAL

## 2025-06-27 ENCOUNTER — APPOINTMENT (EMERGENCY)
Dept: CT IMAGING | Facility: HOSPITAL | Age: 75
End: 2025-06-27
Payer: COMMERCIAL

## 2025-06-27 ENCOUNTER — HOSPITAL ENCOUNTER (EMERGENCY)
Facility: HOSPITAL | Age: 75
End: 2025-06-27
Attending: EMERGENCY MEDICINE
Payer: COMMERCIAL

## 2025-06-27 VITALS
TEMPERATURE: 97 F | RESPIRATION RATE: 20 BRPM | HEART RATE: 76 BPM | SYSTOLIC BLOOD PRESSURE: 130 MMHG | BODY MASS INDEX: 21.14 KG/M2 | DIASTOLIC BLOOD PRESSURE: 72 MMHG | WEIGHT: 149.47 LBS | OXYGEN SATURATION: 99 %

## 2025-06-27 DIAGNOSIS — I99.8 ISCHEMIA OF RIGHT LOWER EXTREMITY: ICD-10-CM

## 2025-06-27 DIAGNOSIS — I99.8 LIMB ISCHEMIA: Primary | ICD-10-CM

## 2025-06-27 DIAGNOSIS — T82.599A OCCLUSION OF STENT OF PERIPHERAL ARTERY, INITIAL ENCOUNTER: Primary | ICD-10-CM

## 2025-06-27 LAB
ANION GAP SERPL CALCULATED.3IONS-SCNC: 11 MMOL/L (ref 4–13)
APTT PPP: 26 SECONDS (ref 23–34)
APTT PPP: 29 SECONDS (ref 23–34)
ATRIAL RATE: 92 BPM
BASOPHILS # BLD AUTO: 0.03 THOUSANDS/ÂΜL (ref 0–0.1)
BASOPHILS NFR BLD AUTO: 0 % (ref 0–1)
BUN SERPL-MCNC: 13 MG/DL (ref 5–25)
CALCIUM SERPL-MCNC: 9.3 MG/DL (ref 8.4–10.2)
CHLORIDE SERPL-SCNC: 99 MMOL/L (ref 96–108)
CK SERPL-CCNC: 53 U/L (ref 39–308)
CO2 SERPL-SCNC: 27 MMOL/L (ref 21–32)
CREAT SERPL-MCNC: 0.85 MG/DL (ref 0.6–1.3)
EOSINOPHIL # BLD AUTO: 0.07 THOUSAND/ÂΜL (ref 0–0.61)
EOSINOPHIL NFR BLD AUTO: 1 % (ref 0–6)
ERYTHROCYTE [DISTWIDTH] IN BLOOD BY AUTOMATED COUNT: 13.6 % (ref 11.6–15.1)
GFR SERPL CREATININE-BSD FRML MDRD: 85 ML/MIN/1.73SQ M
GLUCOSE SERPL-MCNC: 282 MG/DL (ref 65–140)
GLUCOSE SERPL-MCNC: 289 MG/DL (ref 65–140)
HCT VFR BLD AUTO: 50.4 % (ref 36.5–49.3)
HGB BLD-MCNC: 16.9 G/DL (ref 12–17)
IMM GRANULOCYTES # BLD AUTO: 0.03 THOUSAND/UL (ref 0–0.2)
IMM GRANULOCYTES NFR BLD AUTO: 0 % (ref 0–2)
INR PPP: 0.81 (ref 0.85–1.19)
LACTATE SERPL-SCNC: 1.3 MMOL/L (ref 0.5–2)
LYMPHOCYTES # BLD AUTO: 1.92 THOUSANDS/ÂΜL (ref 0.6–4.47)
LYMPHOCYTES NFR BLD AUTO: 19 % (ref 14–44)
MCH RBC QN AUTO: 32.1 PG (ref 26.8–34.3)
MCHC RBC AUTO-ENTMCNC: 33.5 G/DL (ref 31.4–37.4)
MCV RBC AUTO: 96 FL (ref 82–98)
MONOCYTES # BLD AUTO: 0.77 THOUSAND/ÂΜL (ref 0.17–1.22)
MONOCYTES NFR BLD AUTO: 8 % (ref 4–12)
NEUTROPHILS # BLD AUTO: 7.2 THOUSANDS/ÂΜL (ref 1.85–7.62)
NEUTS SEG NFR BLD AUTO: 72 % (ref 43–75)
NRBC BLD AUTO-RTO: 0 /100 WBCS
P AXIS: 73 DEGREES
PLATELET # BLD AUTO: 293 THOUSANDS/UL (ref 149–390)
PMV BLD AUTO: 10.3 FL (ref 8.9–12.7)
POTASSIUM SERPL-SCNC: 3.8 MMOL/L (ref 3.5–5.3)
PR INTERVAL: 170 MS
PROTHROMBIN TIME: 11.7 SECONDS (ref 12.3–15)
QRS AXIS: -34 DEGREES
QRSD INTERVAL: 96 MS
QT INTERVAL: 370 MS
QTC INTERVAL: 457 MS
RBC # BLD AUTO: 5.26 MILLION/UL (ref 3.88–5.62)
SODIUM SERPL-SCNC: 137 MMOL/L (ref 135–147)
T WAVE AXIS: -17 DEGREES
VENTRICULAR RATE: 92 BPM
WBC # BLD AUTO: 10.02 THOUSAND/UL (ref 4.31–10.16)

## 2025-06-27 PROCEDURE — 99285 EMERGENCY DEPT VISIT HI MDM: CPT

## 2025-06-27 PROCEDURE — 75635 CT ANGIO ABDOMINAL ARTERIES: CPT

## 2025-06-27 PROCEDURE — 80048 BASIC METABOLIC PNL TOTAL CA: CPT | Performed by: EMERGENCY MEDICINE

## 2025-06-27 PROCEDURE — 82550 ASSAY OF CK (CPK): CPT | Performed by: EMERGENCY MEDICINE

## 2025-06-27 PROCEDURE — 96366 THER/PROPH/DIAG IV INF ADDON: CPT

## 2025-06-27 PROCEDURE — 36415 COLL VENOUS BLD VENIPUNCTURE: CPT | Performed by: EMERGENCY MEDICINE

## 2025-06-27 PROCEDURE — 83605 ASSAY OF LACTIC ACID: CPT | Performed by: EMERGENCY MEDICINE

## 2025-06-27 PROCEDURE — 99283 EMERGENCY DEPT VISIT LOW MDM: CPT

## 2025-06-27 PROCEDURE — 85610 PROTHROMBIN TIME: CPT | Performed by: EMERGENCY MEDICINE

## 2025-06-27 PROCEDURE — 85730 THROMBOPLASTIN TIME PARTIAL: CPT | Performed by: EMERGENCY MEDICINE

## 2025-06-27 PROCEDURE — 99291 CRITICAL CARE FIRST HOUR: CPT | Performed by: EMERGENCY MEDICINE

## 2025-06-27 PROCEDURE — 82948 REAGENT STRIP/BLOOD GLUCOSE: CPT

## 2025-06-27 PROCEDURE — 93005 ELECTROCARDIOGRAM TRACING: CPT

## 2025-06-27 PROCEDURE — 99223 1ST HOSP IP/OBS HIGH 75: CPT | Performed by: SURGERY

## 2025-06-27 PROCEDURE — 96365 THER/PROPH/DIAG IV INF INIT: CPT

## 2025-06-27 PROCEDURE — 85730 THROMBOPLASTIN TIME PARTIAL: CPT | Performed by: PHYSICIAN ASSISTANT

## 2025-06-27 PROCEDURE — 85025 COMPLETE CBC W/AUTO DIFF WBC: CPT | Performed by: EMERGENCY MEDICINE

## 2025-06-27 RX ORDER — SODIUM CHLORIDE 9 MG/ML
50 INJECTION, SOLUTION INTRAVENOUS CONTINUOUS
Status: DISCONTINUED | OUTPATIENT
Start: 2025-06-28 | End: 2025-06-29 | Stop reason: ALTCHOICE

## 2025-06-27 RX ORDER — ACETAMINOPHEN 325 MG/1
975 TABLET ORAL EVERY 8 HOURS PRN
Status: DISCONTINUED | OUTPATIENT
Start: 2025-06-27 | End: 2025-06-28

## 2025-06-27 RX ORDER — HEPARIN SODIUM 1000 [USP'U]/ML
5200 INJECTION, SOLUTION INTRAVENOUS; SUBCUTANEOUS EVERY 6 HOURS PRN
Status: DISCONTINUED | OUTPATIENT
Start: 2025-06-27 | End: 2025-06-27 | Stop reason: HOSPADM

## 2025-06-27 RX ORDER — HEPARIN SODIUM 10000 [USP'U]/100ML
3-30 INJECTION, SOLUTION INTRAVENOUS
Status: DISCONTINUED | OUTPATIENT
Start: 2025-06-27 | End: 2025-06-28

## 2025-06-27 RX ORDER — ONDANSETRON 2 MG/ML
4 INJECTION INTRAMUSCULAR; INTRAVENOUS EVERY 6 HOURS PRN
Status: DISCONTINUED | OUTPATIENT
Start: 2025-06-27 | End: 2025-06-30 | Stop reason: HOSPADM

## 2025-06-27 RX ORDER — HEPARIN SODIUM 10000 [USP'U]/100ML
3-30 INJECTION, SOLUTION INTRAVENOUS
Status: DISCONTINUED | OUTPATIENT
Start: 2025-06-27 | End: 2025-06-27 | Stop reason: HOSPADM

## 2025-06-27 RX ORDER — HEPARIN SODIUM 1000 [USP'U]/ML
5200 INJECTION, SOLUTION INTRAVENOUS; SUBCUTANEOUS EVERY 6 HOURS PRN
Status: DISCONTINUED | OUTPATIENT
Start: 2025-06-27 | End: 2025-06-28

## 2025-06-27 RX ORDER — HEPARIN SODIUM 1000 [USP'U]/ML
2600 INJECTION, SOLUTION INTRAVENOUS; SUBCUTANEOUS EVERY 6 HOURS PRN
Status: DISCONTINUED | OUTPATIENT
Start: 2025-06-27 | End: 2025-06-27 | Stop reason: HOSPADM

## 2025-06-27 RX ORDER — CALCIUM CARBONATE 500 MG/1
1000 TABLET, CHEWABLE ORAL DAILY PRN
Status: DISCONTINUED | OUTPATIENT
Start: 2025-06-27 | End: 2025-06-30 | Stop reason: HOSPADM

## 2025-06-27 RX ORDER — ATORVASTATIN CALCIUM 80 MG/1
80 TABLET, FILM COATED ORAL
Status: DISCONTINUED | OUTPATIENT
Start: 2025-06-28 | End: 2025-06-30 | Stop reason: HOSPADM

## 2025-06-27 RX ORDER — OXYCODONE HYDROCHLORIDE 5 MG/1
5 TABLET ORAL EVERY 4 HOURS PRN
Refills: 0 | Status: DISCONTINUED | OUTPATIENT
Start: 2025-06-27 | End: 2025-06-29 | Stop reason: ALTCHOICE

## 2025-06-27 RX ORDER — INSULIN LISPRO 100 [IU]/ML
1-5 INJECTION, SOLUTION INTRAVENOUS; SUBCUTANEOUS EVERY 6 HOURS SCHEDULED
Status: DISCONTINUED | OUTPATIENT
Start: 2025-06-27 | End: 2025-06-30 | Stop reason: HOSPADM

## 2025-06-27 RX ORDER — HEPARIN SODIUM 1000 [USP'U]/ML
5200 INJECTION, SOLUTION INTRAVENOUS; SUBCUTANEOUS ONCE
Status: COMPLETED | OUTPATIENT
Start: 2025-06-27 | End: 2025-06-27

## 2025-06-27 RX ORDER — ASPIRIN 81 MG/1
81 TABLET, CHEWABLE ORAL DAILY
Status: DISCONTINUED | OUTPATIENT
Start: 2025-06-28 | End: 2025-06-30 | Stop reason: HOSPADM

## 2025-06-27 RX ORDER — HEPARIN SODIUM 1000 [USP'U]/ML
2600 INJECTION, SOLUTION INTRAVENOUS; SUBCUTANEOUS EVERY 6 HOURS PRN
Status: DISCONTINUED | OUTPATIENT
Start: 2025-06-27 | End: 2025-06-28

## 2025-06-27 RX ADMIN — IOHEXOL 120 ML: 350 INJECTION, SOLUTION INTRAVENOUS at 15:40

## 2025-06-27 RX ADMIN — HEPARIN SODIUM 18 UNITS/KG/HR: 10000 INJECTION, SOLUTION INTRAVENOUS at 15:48

## 2025-06-27 RX ADMIN — HEPARIN SODIUM 5200 UNITS: 1000 INJECTION INTRAVENOUS; SUBCUTANEOUS at 15:48

## 2025-06-27 RX ADMIN — HEPARIN SODIUM 18 UNITS/KG/HR: 10000 INJECTION, SOLUTION INTRAVENOUS at 21:04

## 2025-06-27 NOTE — ED PROVIDER NOTES
Time reflects when diagnosis was documented in both MDM as applicable and the Disposition within this note       Time User Action Codes Description Comment    6/27/2025  4:28 PM Danyel Young Add [T82.599A] Occlusion of stent of peripheral artery, initial encounter     6/27/2025  4:39 PM Danyel Young Add [I99.8] Ischemia of right lower extremity           ED Disposition       ED Disposition   Transfer to Another Facility-In Network    Condition   --    Date/Time   Fri Jun 27, 2025  4:28 PM    Comment   Rick Power should be transferred out to [facility].               Assessment & Plan       Medical Decision Making  Unable to detect popliteal or DP/PT pulses in right lower extremity.  He has an improving symptomatic exam by his report compared to what he had at the onset.  Still concern for acute arterial occlusion of some kind: Embolic/thrombotic most likely. This certainly appears to be a vascular phenomenon and not neurologic. Well-defined time of onset at 1230.  Basic laboratory testing.  Consultation with vascular regarding optimal imaging strategy.  Disposition pending.    1610: Re-exam:  Popliteal/DP/PT pulses 0 (undetectable by palpation or with Doppler)  Full AROM of RLE at hip/knee/ankle. Intact sensation in b/l LE as before  Improving pallor of distal R leg compared to prior; R foot remains pale and cool with prolonged capillary refill  He feels that exam is improving overall: reports improved subjective sensation and general resolution of paresthesias he had previously    Critical Care Time Statement: Upon my evaluation, this patient had a high probability of imminent or life-threatening deterioration due to acute limb ischemia, which required my direct attention, intervention, and personal management.  I spent a total of 45 minutes directly providing critical care services, including interpretation of complex medical databases, evaluating for the presence of life-threatening injuries or  illnesses, management of organ system failure(s) , complex medical decision making (to support/prevent further life-threatening deterioration)., and titration of continuous IV medications (drips). This time is exclusive of procedures, teaching, treating other patients, family meetings, and any prior time recorded by providers other than myself.        Amount and/or Complexity of Data Reviewed  Labs: ordered. Decision-making details documented in ED Course.  Radiology: ordered. Decision-making details documented in ED Course.    Risk  Prescription drug management.        ED Course as of 06/28/25 0706   Fri Jun 27, 2025   1514 Secure message to vascular surgery on-call to discuss case: Dr. RAMILA Man advises starting VTE protocol heparin and obtaining CTA abdomen with bilateral lower extremity runoff. Both of these were ordered.  Discussed with CT technician to expedite the study.   1532 CBC and differential(!)  WBC normal.  Hemoglobin normal.  Hematocrit elevated.  Platelets normal.   1532 Patient to CT scan now   1541 Protime-INR(!)  Below reference range   1541 APTT  Normal   1559 CK  Normal   1559 Basic metabolic panel(!)  Normal electrolytes.  Baseline renal function.  Mild hyperglycemia   1606 CTA is being reviewed by Dr. Sanchez   1620 Dr Man of vascular surgery reviewed  Occlusion of SFA stent with distal reconstitution  Transfer to [facility] under care of Dr Madrid  High-priority as he needs rapid vascular evaluation; anticipate OR tomorrow, but would go to OR even sooner if he develops any recurrent ischemic symptoms.  Continue heparin drip   1637 Patient updated regarding need for transfer  He and family are in agreement  D/w PAC regarding high-priority transfer  Aeromedical transport to be arranged   1705 CTA abdominal w run off w wo contrast  IMPRESSION:        1. Atherosclerotic disease is reflecting the abdominal aorta, branch vessels, and arterial system of both lower extremities as detailed above.  2.  Occluded previously placed distal right superficial femoral/proximal popliteal artery stent with reconstitution of the P2 segment of the right popliteal artery. Segmental atherosclerotic disease of the tibioperoneal vessels with one-vessel runoff via   the anterior tibial artery to the ankle and foot via the dorsalis pedis. Slightly delayed filling of the tibioperoneal vessels.  3. Occluded P1/P2 left popliteal artery segments with reconstitution of the P3 segment with segmental atherosclerotic disease of the tibioperoneal vessels with two-vessel runoff via the posterior tibial and peroneal arteries to the ankle and foot.  4. Incidental findings as described above.         Medications   heparin (porcine) injection 5,200 Units (5,200 Units Intravenous Given 6/27/25 1548)   iohexol (OMNIPAQUE) 350 MG/ML injection (MULTI-DOSE) 120 mL (120 mL Intravenous Given 6/27/25 1540)       ED Risk Strat Scores                    (ISAR) Identification of Seniors at Risk  Before the illness or injury that brought you to the Emergency, did you need someone to help you on a regular basis?: 0  In the last 24 hours, have you needed more help than usual?: 0  Have you been hospitalized for one or more nights during the past 6 months?: 0  In general, do you see well?: 0  In general, do you have serious problems with your memory?: 0  Do you take more than three different medications every day?: 0  ISAR Score: 0            SBIRT 20yo+      Flowsheet Row Most Recent Value   Initial Alcohol Screen: US AUDIT-C     1. How often do you have a drink containing alcohol? 0 Filed at: 06/27/2025 1429   2. How many drinks containing alcohol do you have on a typical day you are drinking?  0 Filed at: 06/27/2025 1429   3a. Male UNDER 65: How often do you have five or more drinks on one occasion? 0 Filed at: 06/27/2025 1429   3b. FEMALE Any Age, or MALE 65+: How often do you have 4 or more drinks on one occassion? 0 Filed at: 06/27/2025 1429   Audit-C  Score 0 Filed at: 06/27/2025 0086   ARACELI: How many times in the past year have you...    Used an illegal drug or used a prescription medication for non-medical reasons? Never Filed at: 06/27/2025 5612                            History of Present Illness       Chief Complaint   Patient presents with    Numbness     Pt states that he had no circulation in his right leg in 2021 and got surgery for it. Pt states that today his right leg started with a cramp from his knee down and now his right foot is numb.        Past Medical History[1]   Past Surgical History[2]   Family History[3]   Social History[4]   E-Cigarette/Vaping    E-Cigarette Use Never User       E-Cigarette/Vaping Substances    Nicotine No     THC No     CBD No     Flavoring No     Other No     Unknown No       I have reviewed and agree with the history as documented.     74M with medical history as noted presents to the emergency department with development of cramping pain in the right calf at 1230 today while at home with the limb subsequently becoming cold and briefly paralyzed.  He states that he had a quite rapid onset of the cramping pain in the right lower extremity with the limb then becoming numb and insensate from the knee distally. Patient initially unable to walk as he was unable to place weight on it; the limb from the knee distally would not respond to volitional movement. Within the hour prior to arrival, he states that he is able to move the limb fully now.  He has had gradual return of sensation in the limb although he still feels that there is subjectively decreased sensation from the knee distally. No longer has any pain in the limb whatsoever.  Still feels that his right foot is somewhat cold, although the calf feels warmer to his assessment.    Underwent right SFA stent due to chronic occlusion in 2021 at [facility] with postprocedure resolution of ischemic rest pain and arterial ulcers that he been experiencing up to that point. He did  have detectable anterior tibial pulse postprocedure according to the procedure note and vascular notes. He developed foot rubor postprocedure with some more chronic pallor following that.  The right foot does have notable pallor today which seems more intense than what is his baseline.  With the current symptoms, he did not develop any stroke-like symptoms: No headache/vision changes/dysarthria/facial droop/extremity weakness or paresthesias.  He been feeling well until this episode.  He has been taking the Aggrenox that he is prescribed.  He does not take any other anticoagulant or antiplatelet medications.      History provided by:  Patient, medical records and relative (Patient's daughter)      Review of Systems   Constitutional:  Negative for chills, fatigue and fever.   Eyes:  Negative for photophobia and visual disturbance.   Respiratory: Negative.     Cardiovascular: Negative.    Gastrointestinal:  Negative for abdominal pain, nausea and vomiting.   Musculoskeletal:  Positive for arthralgias and gait problem. Negative for joint swelling and myalgias.   Skin:  Positive for color change. Negative for pallor, rash and wound.   Neurological:  Positive for weakness and numbness. Negative for dizziness, seizures, syncope, facial asymmetry, speech difficulty and headaches.           Objective       ED Triage Vitals [06/27/25 1426]   Temperature Pulse Blood Pressure Respirations SpO2 Patient Position - Orthostatic VS   (!) 97 °F (36.1 °C) (!) 110 135/100 18 97 % Lying      Temp Source Heart Rate Source BP Location FiO2 (%) Pain Score    Temporal Monitor Right arm -- No Pain      Vitals      Date and Time Temp Pulse SpO2 Resp BP Pain Score FACES Pain Rating User   06/27/25 1800 97 °F (36.1 °C) 76 99 % 20 130/72 No Pain -- EZ   06/27/25 1715 97 °F (36.1 °C) 92 98 % 20 137/75 No Pain -- EZ   06/27/25 1600 97 °F (36.1 °C) 101 99 % 20 158/74 No Pain -- EZ   06/27/25 1500 97 °F (36.1 °C) 93 97 % 18 142/69 No Pain -- EZ    06/27/25 1426 97 °F (36.1 °C) 110 97 % 18 135/100 No Pain -- AB            Physical Exam  Vitals and nursing note reviewed.   Constitutional:       General: He is awake. He is not in acute distress.     Appearance: Normal appearance. He is well-developed.   HENT:      Head: Normocephalic and atraumatic.      Right Ear: Hearing and external ear normal.      Left Ear: Hearing and external ear normal.   Neck:      Trachea: Trachea and phonation normal.     Cardiovascular:      Rate and Rhythm: Normal rate and regular rhythm.      Pulses:           Radial pulses are 2+ on the right side and 2+ on the left side.        Femoral pulses are 2+ on the right side and 2+ on the left side.       Popliteal pulses are 0 on the right side and 2+ on the left side.        Dorsalis pedis pulses are 0 on the right side and 2+ on the left side.        Posterior tibial pulses are 0 on the right side and 2+ on the left side.      Heart sounds: Normal heart sounds, S1 normal and S2 normal. No murmur heard.     No friction rub. No gallop.      Comments: Unable to detect right popliteal, DP, or PT with color flow or auditory Doppler  Right lower extremity is cold with pallor from the mid calf distally  Pulmonary:      Effort: Pulmonary effort is normal. No respiratory distress.      Breath sounds: Normal breath sounds. No stridor. No decreased breath sounds, wheezing, rhonchi or rales.   Abdominal:      General: There is no distension.      Palpations: There is no mass.      Tenderness: There is no abdominal tenderness. There is no guarding or rebound.     Skin:     General: Skin is warm and dry.      Comments: Right lower extremity greater than 3 seconds  Left lower extremity 2-3 seconds  Right lower extremity cold from the mid calf distally with pallor in that same distribution     Neurological:      Mental Status: He is alert and oriented to person, place, and time.      GCS: GCS eye subscore is 4. GCS verbal subscore is 5. GCS motor  subscore is 6.      Cranial Nerves: No cranial nerve deficit.      Sensory: No sensory deficit.      Motor: No abnormal muscle tone.      Comments: PERRLA; EOMI. Sensation intact to light touch over face in V1-V3 distribution bilaterally. Facial expressions symmetric. Tongue/uvula midline. Shoulder shrug equal bilaterally. Strength 5/5 in UE/LE bilaterally. Sensation intact to light touch in UE/LE bilaterally.    Intact sharp/dull sensation in bilateral lower extremity: symmetric and equal.  Patient is able to range the right lower extremity at the hip/knee/ankle with 5/5 strength equal to the left lower extremity       Results Reviewed       Procedure Component Value Units Date/Time    Basic metabolic panel [[id number]]  (Abnormal) Collected: 06/27/25 1514    Lab Status: Final result Specimen: Blood from Arm, Right Updated: 06/27/25 1553     Sodium 137 mmol/L      Potassium 3.8 mmol/L      Chloride 99 mmol/L      CO2 27 mmol/L      ANION GAP 11 mmol/L      BUN 13 mg/dL      Creatinine 0.85 mg/dL      Glucose 282 mg/dL      Calcium 9.3 mg/dL      eGFR 85 ml/min/1.73sq m     Narrative:      National Kid[employer] Foundation guidelines for Chronic Kidney Disease (CKD):     Stage 1 with normal or high GFR (GFR > 90 mL/min/1.73 square meters)    Stage 2 Mild CKD (GFR = 60-89 mL/min/1.73 square meters)    Stage 3A Moderate CKD (GFR = 45-59 mL/min/1.73 square meters)    Stage 3B Moderate CKD (GFR = 30-44 mL/min/1.73 square meters)    Stage 4 Severe CKD (GFR = 15-29 mL/min/1.73 square meters)    Stage 5 End Stage CKD (GFR <15 mL/min/1.73 square meters)  Note: GFR calculation is accurate only with a steady state creatinine    CK [[id number]]  (Normal) Collected: 06/27/25 1514    Lab Status: Final result Specimen: Blood from Arm, Right Updated: 06/27/25 1553     Total CK 53 U/L     Lactic acid, plasma (w/reflex if result > 2.0) [[id number]]  (Normal) Collected: 06/27/25 1514    Lab Status: Final result Specimen: Blood from  Arm, Right Updated: 06/27/25 1552     LACTIC ACID 1.3 mmol/L     Narrative:      Result may be elevated if tourniquet was used during collection.    APTT [[id number]]  (Normal) Collected: 06/27/25 1528    Lab Status: Final result Specimen: Blood Updated: 06/27/25 1539     PTT 26 seconds     Protime-INR [[id number]]  (Abnormal) Collected: 06/27/25 1528    Lab Status: Final result Specimen: Blood Updated: 06/27/25 1539     Protime 11.7 seconds      INR 0.81    Narrative:      INR Therapeutic Range    Indication                                             INR Range      Atrial Fibrillation                                               2.0-3.0  Hypercoagulable State                                    2.0.2.3  Left Ventricular Asist Device                            2.0-3.0  Mechanical Heart Valve                                  -    Aortic(with afib, MI, embolism, HF, LA enlargement,    and/or coagulopathy)                                     2.0-3.0 (2.5-3.5)     Mitral                                                             2.5-3.5  Prosthetic/Bioprosthetic Heart Valve               2.0-3.0  Venous thromboembolism (VTE: VT, PE        2.0-3.0    CBC and differential [[id number]]  (Abnormal) Collected: 06/27/25 1514    Lab Status: Final result Specimen: Blood from Arm, Right Updated: 06/27/25 1527     WBC 10.02 Thousand/uL      RBC 5.26 Million/uL      Hemoglobin 16.9 g/dL      Hematocrit 50.4 %      MCV 96 fL      MCH 32.1 pg      MCHC 33.5 g/dL      RDW 13.6 %      MPV 10.3 fL      Platelets 293 Thousands/uL      nRBC 0 /100 WBCs      Segmented % 72 %      Immature Grans % 0 %      Lymphocytes % 19 %      Monocytes % 8 %      Eosinophils Relative 1 %      Basophils Relative 0 %      Absolute Neutrophils 7.20 Thousands/µL      Absolute Immature Grans 0.03 Thousand/uL      Absolute Lymphocytes 1.92 Thousands/µL      Absolute Monocytes 0.77 Thousand/µL      Eosinophils Absolute 0.07 Thousand/µL      Basophils Absolute  0.03 Thousands/µL     Fingerstick Glucose (POCT) [[id number]]  (Abnormal) Collected: 06/27/25 1511    Lab Status: Final result Specimen: Blood Updated: 06/27/25 1517     POC Glucose 289 mg/dl             CTA abdominal w run off w wo contrast   Final Interpretation by Shukri Sanchez MD (06/27 1650)         1. Atherosclerotic disease is reflecting the abdominal aorta, branch vessels, and arterial system of both lower extremities as detailed above.   2. Occluded previously placed distal right superficial femoral/proximal popliteal artery stent with reconstitution of the P2 segment of the right popliteal artery. Segmental atherosclerotic disease of the tibioperoneal vessels with one-vessel runoff via    the anterior tibial artery to the ankle and foot via the dorsalis pedis. Slightly delayed filling of the tibioperoneal vessels.   3. Occluded P1/P2 left popliteal artery segments with reconstitution of the P3 segment with segmental atherosclerotic disease of the tibioperoneal vessels with two-vessel runoff via the posterior tibial and peroneal arteries to the ankle and foot.   4. Incidental findings as described above.            Workstation performed: [id number]             Procedures    ED Medication and Procedure Management   Prior to Admission Medications   Prescriptions Last Dose Informant Patient Reported? Taking?   Blood Glucose Monitoring Suppl (OneTouch Verio Reflect) w/Device KIT 6/27/2025 Self No Yes   Sig: Check blood sugars once daily. Please substitute with appropriate alternative as covered by patient's insurance. Dx: E11.65   Empagliflozin (Jardiance) 25 MG TABS 6/27/2025  No Yes   Sig: Take 1 tablet (25 mg total) by mouth every morning   OneTouch Delica Lancets 33G MISC 6/27/2025 Self No Yes   Sig: Check blood sugars once daily. Please substitute with appropriate alternative as covered by patient's insurance. Dx: E11.65   acetaminophen (TYLENOL) 500 mg tablet 6/27/2025 Self No Yes   Sig: Take 1  tablet (500 mg total) by mouth every 6 (six) hours as needed for mild pain   aspirin-dipyridamole (AGGRENOX)  mg per 12 hr capsule 6/27/2025 Self No Yes   Sig: Take 1 capsule by mouth every 12 (twelve) hours   atorvastatin (LIPITOR) 80 mg tablet 6/27/2025 Self No Yes   Sig: TAKE 1 TABLET BY MOUTH DAILY WITH DINNER   glucose blood (OneTouch Verio) test strip 6/27/2025 Self No Yes   Sig: Check blood sugars once daily. Please substitute with appropriate alternative as covered by patient's insurance. Dx: E11.65   sitaGLIPtin (Januvia) 100 mg tablet 6/27/2025  No Yes   Sig: Take 1 tablet (100 mg total) by mouth daily   vitamin B-12 (VITAMIN B-12) 1,000 mcg tablet 6/27/2025 Self No Yes   Sig: Take 1 tablet (1,000 mcg total) by mouth daily      Facility-Administered Medications: None     Discharge Medication List as of 6/27/2025  6:28 PM        CONTINUE these medications which have NOT CHANGED    Details   acetaminophen (TYLENOL) 500 mg tablet Take 1 tablet (500 mg total) by mouth every 6 (six) hours as needed for mild pain, Starting Fri 8/19/2022, Normal      aspirin-dipyridamole (AGGRENOX)  mg per 12 hr capsule Take 1 capsule by mouth every 12 (twelve) hours, Starting Mon 12/9/2024, Normal      atorvastatin (LIPITOR) 80 mg tablet TAKE 1 TABLET BY MOUTH DAILY WITH DINNER, Starting Mon 1/22/2024, Normal      Blood Glucose Monitoring Suppl (OneTouch Verio Reflect) w/Device KIT Check blood sugars once daily. Please substitute with appropriate alternative as covered by patient's insurance. Dx: E11.65, Normal      Empagliflozin (Jardiance) 25 MG TABS Take 1 tablet (25 mg total) by mouth every morning, Starting Fri 5/2/2025, Normal      glucose blood (OneTouch Verio) test strip Check blood sugars once daily. Please substitute with appropriate alternative as covered by patient's insurance. Dx: E11.65, Normal      OneTouch Delica Lancets 33G MISC Check blood sugars once daily. Please substitute with appropriate  alternative as covered by patient's insurance. Dx: E11.65, Normal      sitaGLIPtin (Januvia) 100 mg tablet Take 1 tablet (100 mg total) by mouth daily, Starting 2025, Normal      vitamin B-12 (VITAMIN B-12) 1,000 mcg tablet Take 1 tablet (1,000 mcg total) by mouth daily, Starting Fri 10/27/2023, Normal           No discharge procedures on file.  ED SEPSIS DOCUMENTATION   Time reflects when diagnosis was documented in both MDM as applicable and the Disposition within this note       Time User Action Codes Description Comment    2025  4:28 PM Danyel Young [T82.599A] Occlusion of stent of peripheral artery, initial encounter     2025  4:39 PM Danyel Young [I99.8] Ischemia of right lower extremity                    [1]   Past Medical History:  Diagnosis Date    Abnormal CT of the abdomen     last assessed 16    Anemia     Diabetes mellitus (HCC)     Iron deficiency anemia     last assessed 16    TIA (transient ischemic attack)    [2]   Past Surgical History:  Procedure Laterality Date    EPIDURAL BLOCK INJECTION Bilateral 10/19/2017    Procedure: L5 TRANSFORAMINAL EPIDURAL STEROID INJECTION;  Surgeon: Trev Fajardo DO;  Location: [facility];  Service: Pain Management     IR AORTAGRAM WITH RUN-OFF  10/29/2021   [3] No family history on file.  [4]   Social History  Tobacco Use    Smoking status: Former     Current packs/day: 0.00     Average packs/day: 1 pack/day for 53.7 years (53.7 ttl pk-yrs)     Types: Cigars, Cigarettes     Start date:      Quit date: 2019     Years since quittin.7     Passive exposure: Never    Smokeless tobacco: Never    Tobacco comments:     1 a day   Vaping Use    Vaping status: Never Used   Substance Use Topics    Alcohol use: Yes     Comment: social    Drug use: No        Danyel Young DO  25 [id number]

## 2025-06-27 NOTE — EMTALA/ACUTE CARE TRANSFER
Critical access hospital EMERGENCY DEPARTMENT  [address] [facility] [address]  Dept: [phone]      EMTALA TRANSFER CONSENT    NAME Rick Power DOB 1950                              MRN [id number]    I have been informed of my rights regarding examination, treatment, and transfer   by Dr. Danyel Young DO    Benefits: Specialized equipment and/or services available at the receiving facility (Include comment)________________________ (Vascular surgery/OR/IR)    Risks: Potential for delay in receiving treatment, Potential deterioration of medical condition, Other: (Include comment)__________________________, Loss of IV, Increased discomfort during transfer, Possible worsening of condition or death during transfer (Motor vehicle crash)      Transfer Request   I acknowledge that my medical condition has been evaluated and explained to me by the emergency department physician or other qualified medical person and/or my attending physician who has recommended and offered to me further medical examination and treatment. I understand the Hospital's obligation with respect to the treatment and stabilization of my emergency medical condition. I nevertheless request to be transferred. I release the Hospital, the doctor, and any other persons caring for me from all responsibility or liability for any injury or ill effects that may result from my transfer and agree to accept all responsibility for the consequences of my choice to transfer, rather than receive stabilizing treatment at the Hospital. I understand that because the transfer is my request, my insurance may not provide reimbursement for the services.  The Hospital will assist and direct me and my family in how to make arrangements for transfer, but the hospital is not liable for any fees charged by the transport service.  In spite of this understanding, I refuse to consent to further medical examination  and treatment which has been offered to me, and request transfer to Accepting Facility Name, City & State : [facility]; [place]. I authorize the performance of emergency medical procedures and treatments upon me in both transit and upon arrival at the receiving facility.  Additionally, I authorize the release of any and all medical records to the receiving facility and request they be transported with me, if possible.    I authorize the performance of emergency medical procedures and treatments upon me in both transit and upon arrival at the receiving facility.  Additionally, I authorize the release of any and all medical records to the receiving facility and request they be transported with me, if possible.  I understand that the safest mode of transportation during a medical emergency is an ambulance and that the Hospital advocates the use of this mode of transport. Risks of traveling to the receiving facility by car, including absence of medical control, life sustaining equipment, such as oxygen, and medical personnel has been explained to me and I fully understand them.    (URI CORRECT BOX BELOW)  [  ]  I consent to the stated transfer and to be transported by ambulance/helicopter.  [  ]  I consent to the stated transfer, but refuse transportation by ambulance and accept full responsibility for my transportation by car.  I understand the risks of non-ambulance transfers and I exonerate the Hospital and its staff from any deterioration in my condition that results from this refusal.    X___________________________________________    DATE  25  TIME________  Signature of patient or legally responsible individual signing on patient behalf           RELATIONSHIP TO PATIENT_________________________          Provider Certification    NAME Rick Power                                         1950                              MRN [id number]    A medical screening exam was performed on the above named  patient.  Based on the examination:    Condition Necessitating Transfer The primary encounter diagnosis was Occlusion of stent of peripheral artery, initial encounter. A diagnosis of Ischemia of right lower extremity was also pertinent to this visit.    Patient Condition: The patient has been stabilized such that within reasonable medical probability, no material deterioration of the patient condition or the condition of the unborn child(heather) is likely to result from the transfer    Reason for Transfer: Level of Care needed not available at this facility (Vascular surgery/OR/IR)    Transfer Requirements: Facility [facility]; [facility]   Space available and qualified personnel available for treatment as acknowledged by    Agreed to accept transfer and to provide appropriate medical treatment as acknowledged by       Dr Quesada  Appropriate medical records of the examination and treatment of the patient are provided at the time of transfer   STAFF INITIAL WHEN COMPLETED _______  Transfer will be performed by qualified personnel from    and appropriate transfer equipment as required, including the use of necessary and appropriate life support measures.    Provider Certification: I have examined the patient and explained the following risks and benefits of being transferred/refusing transfer to the patient/family:  General risk, such as traffic hazards, adverse weather conditions, rough terrain or turbulence, possible failure of equipment (including vehicle or aircraft), or consequences of actions of persons outside the control of the transport personnel      Based on these reasonable risks and benefits to the patient and/or the unborn child(heather), and based upon the information available at the time of the patient’s examination, I certify that the medical benefits reasonably to be expected from the provision of appropriate medical treatments at another medical facility outweigh the increasing risks, if any, to the  individual’s medical condition, and in the case of labor to the unborn child, from effecting the transfer.    X____________________________________________ DATE 06/27/25        TIME_______      ORIGINAL - SEND TO MEDICAL RECORDS   COPY - SEND WITH PATIENT DURING TRANSFER

## 2025-06-27 NOTE — ED RE-EVALUATION NOTE
Patient transported party 1 via ground due to weather.  Left emergency department 6:17 PM with heparin infusing.  No complaints offered prior to departing the emergency room.     [facility], DO  06/27/25 1811

## 2025-06-28 ENCOUNTER — APPOINTMENT (INPATIENT)
Dept: RADIOLOGY | Facility: HOSPITAL | Age: 75
DRG: 253 | End: 2025-06-28
Payer: COMMERCIAL

## 2025-06-28 ENCOUNTER — APPOINTMENT (INPATIENT)
Dept: RADIOLOGY | Facility: HOSPITAL | Age: 75
DRG: 253 | End: 2025-06-28
Attending: RADIOLOGY
Payer: COMMERCIAL

## 2025-06-28 LAB
ANION GAP SERPL CALCULATED.3IONS-SCNC: 11 MMOL/L (ref 4–13)
APTT PPP: 39 SECONDS (ref 23–34)
APTT PPP: 40 SECONDS (ref 23–34)
APTT PPP: 69 SECONDS (ref 23–34)
BUN SERPL-MCNC: 12 MG/DL (ref 5–25)
CALCIUM SERPL-MCNC: 8.6 MG/DL (ref 8.4–10.2)
CHLORIDE SERPL-SCNC: 105 MMOL/L (ref 96–108)
CO2 SERPL-SCNC: 24 MMOL/L (ref 21–32)
CREAT SERPL-MCNC: 0.64 MG/DL (ref 0.6–1.3)
ERYTHROCYTE [DISTWIDTH] IN BLOOD BY AUTOMATED COUNT: 13.7 % (ref 11.6–15.1)
FIBRINOGEN PPP-MCNC: 169 MG/DL (ref 206–523)
FIBRINOGEN PPP-MCNC: 194 MG/DL (ref 206–523)
FIBRINOGEN PPP-MCNC: 319 MG/DL (ref 206–523)
GFR SERPL CREATININE-BSD FRML MDRD: 96 ML/MIN/1.73SQ M
GLUCOSE SERPL-MCNC: 128 MG/DL (ref 65–140)
GLUCOSE SERPL-MCNC: 136 MG/DL (ref 65–140)
GLUCOSE SERPL-MCNC: 139 MG/DL (ref 65–140)
GLUCOSE SERPL-MCNC: 146 MG/DL (ref 65–140)
GLUCOSE SERPL-MCNC: 150 MG/DL (ref 65–140)
HCT VFR BLD AUTO: 43.8 % (ref 36.5–49.3)
HCT VFR BLD AUTO: 44.4 % (ref 36.5–49.3)
HCT VFR BLD AUTO: 46.2 % (ref 36.5–49.3)
HGB BLD-MCNC: 14.2 G/DL (ref 12–17)
HGB BLD-MCNC: 14.8 G/DL (ref 12–17)
HGB BLD-MCNC: 15.5 G/DL (ref 12–17)
MCH RBC QN AUTO: 31.4 PG (ref 26.8–34.3)
MCH RBC QN AUTO: 31.5 PG (ref 26.8–34.3)
MCH RBC QN AUTO: 32 PG (ref 26.8–34.3)
MCHC RBC AUTO-ENTMCNC: 32.4 G/DL (ref 31.4–37.4)
MCHC RBC AUTO-ENTMCNC: 33.3 G/DL (ref 31.4–37.4)
MCHC RBC AUTO-ENTMCNC: 33.5 G/DL (ref 31.4–37.4)
MCV RBC AUTO: 94 FL (ref 82–98)
MCV RBC AUTO: 96 FL (ref 82–98)
MCV RBC AUTO: 97 FL (ref 82–98)
PLATELET # BLD AUTO: 190 THOUSANDS/UL (ref 149–390)
PLATELET # BLD AUTO: 191 THOUSANDS/UL (ref 149–390)
PLATELET # BLD AUTO: 248 THOUSANDS/UL (ref 149–390)
PMV BLD AUTO: 9.5 FL (ref 8.9–12.7)
PMV BLD AUTO: 9.6 FL (ref 8.9–12.7)
PMV BLD AUTO: 9.7 FL (ref 8.9–12.7)
POTASSIUM SERPL-SCNC: 3.8 MMOL/L (ref 3.5–5.3)
RBC # BLD AUTO: 4.52 MILLION/UL (ref 3.88–5.62)
RBC # BLD AUTO: 4.62 MILLION/UL (ref 3.88–5.62)
RBC # BLD AUTO: 4.92 MILLION/UL (ref 3.88–5.62)
SODIUM SERPL-SCNC: 140 MMOL/L (ref 135–147)
WBC # BLD AUTO: 14.9 THOUSAND/UL (ref 4.31–10.16)
WBC # BLD AUTO: 15.32 THOUSAND/UL (ref 4.31–10.16)
WBC # BLD AUTO: 8.09 THOUSAND/UL (ref 4.31–10.16)

## 2025-06-28 PROCEDURE — 99223 1ST HOSP IP/OBS HIGH 75: CPT | Performed by: SURGERY

## 2025-06-28 PROCEDURE — 36415 COLL VENOUS BLD VENIPUNCTURE: CPT | Performed by: PHYSICIAN ASSISTANT

## 2025-06-28 PROCEDURE — C1769 GUIDE WIRE: HCPCS

## 2025-06-28 PROCEDURE — 99231 SBSQ HOSP IP/OBS SF/LOW 25: CPT | Performed by: PHYSICIAN ASSISTANT

## 2025-06-28 PROCEDURE — 82948 REAGENT STRIP/BLOOD GLUCOSE: CPT

## 2025-06-28 PROCEDURE — 99152 MOD SED SAME PHYS/QHP 5/>YRS: CPT | Performed by: RADIOLOGY

## 2025-06-28 PROCEDURE — 36573 INSJ PICC RS&I 5 YR+: CPT

## 2025-06-28 PROCEDURE — 02HV33Z INSERTION OF INFUSION DEVICE INTO SUPERIOR VENA CAVA, PERCUTANEOUS APPROACH: ICD-10-PCS | Performed by: RADIOLOGY

## 2025-06-28 PROCEDURE — 76937 US GUIDE VASCULAR ACCESS: CPT

## 2025-06-28 PROCEDURE — 85730 THROMBOPLASTIN TIME PARTIAL: CPT | Performed by: SURGERY

## 2025-06-28 PROCEDURE — C1751 CATH, INF, PER/CENT/MIDLINE: HCPCS

## 2025-06-28 PROCEDURE — 36247 INS CATH ABD/L-EXT ART 3RD: CPT | Performed by: RADIOLOGY

## 2025-06-28 PROCEDURE — C1894 INTRO/SHEATH, NON-LASER: HCPCS

## 2025-06-28 PROCEDURE — C1887 CATHETER, GUIDING: HCPCS

## 2025-06-28 PROCEDURE — 37211 THROMBOLYTIC ART THERAPY: CPT

## 2025-06-28 PROCEDURE — 80048 BASIC METABOLIC PNL TOTAL CA: CPT | Performed by: PHYSICIAN ASSISTANT

## 2025-06-28 PROCEDURE — 75820 VEIN X-RAY ARM/LEG: CPT

## 2025-06-28 PROCEDURE — 85730 THROMBOPLASTIN TIME PARTIAL: CPT | Performed by: PHYSICIAN ASSISTANT

## 2025-06-28 PROCEDURE — 85384 FIBRINOGEN ACTIVITY: CPT | Performed by: RADIOLOGY

## 2025-06-28 PROCEDURE — 36573 INSJ PICC RS&I 5 YR+: CPT | Performed by: RADIOLOGY

## 2025-06-28 PROCEDURE — 70450 CT HEAD/BRAIN W/O DYE: CPT

## 2025-06-28 PROCEDURE — 3E03317 INTRODUCTION OF OTHER THROMBOLYTIC INTO PERIPHERAL VEIN, PERCUTANEOUS APPROACH: ICD-10-PCS | Performed by: RADIOLOGY

## 2025-06-28 PROCEDURE — 37211 THROMBOLYTIC ART THERAPY: CPT | Performed by: RADIOLOGY

## 2025-06-28 PROCEDURE — 85730 THROMBOPLASTIN TIME PARTIAL: CPT | Performed by: RADIOLOGY

## 2025-06-28 PROCEDURE — 85027 COMPLETE CBC AUTOMATED: CPT | Performed by: RADIOLOGY

## 2025-06-28 PROCEDURE — 85027 COMPLETE CBC AUTOMATED: CPT | Performed by: PHYSICIAN ASSISTANT

## 2025-06-28 PROCEDURE — 85384 FIBRINOGEN ACTIVITY: CPT | Performed by: PHYSICIAN ASSISTANT

## 2025-06-28 PROCEDURE — 047K3DZ DILATION OF RIGHT FEMORAL ARTERY WITH INTRALUMINAL DEVICE, PERCUTANEOUS APPROACH: ICD-10-PCS | Performed by: RADIOLOGY

## 2025-06-28 RX ORDER — GABAPENTIN 100 MG/1
100 CAPSULE ORAL 3 TIMES DAILY
Status: DISCONTINUED | OUTPATIENT
Start: 2025-06-28 | End: 2025-06-30 | Stop reason: HOSPADM

## 2025-06-28 RX ORDER — HEPARIN SODIUM 200 [USP'U]/100ML
20 INJECTION, SOLUTION INTRAVENOUS CONTINUOUS
Status: DISCONTINUED | OUTPATIENT
Start: 2025-06-28 | End: 2025-06-29

## 2025-06-28 RX ORDER — HEPARIN SODIUM 10000 [USP'U]/100ML
400 INJECTION, SOLUTION INTRAVENOUS
Status: DISCONTINUED | OUTPATIENT
Start: 2025-06-28 | End: 2025-06-29

## 2025-06-28 RX ORDER — LIDOCAINE WITH 8.4% SOD BICARB 0.9%(10ML)
SYRINGE (ML) INJECTION AS NEEDED
Status: COMPLETED | OUTPATIENT
Start: 2025-06-28 | End: 2025-06-28

## 2025-06-28 RX ORDER — MIDAZOLAM HYDROCHLORIDE 2 MG/2ML
INJECTION, SOLUTION INTRAMUSCULAR; INTRAVENOUS AS NEEDED
Status: COMPLETED | OUTPATIENT
Start: 2025-06-28 | End: 2025-06-28

## 2025-06-28 RX ORDER — HYDROMORPHONE HCL/PF 1 MG/ML
0.5 SYRINGE (ML) INJECTION ONCE
Status: COMPLETED | OUTPATIENT
Start: 2025-06-28 | End: 2025-06-28

## 2025-06-28 RX ORDER — FENTANYL CITRATE 50 UG/ML
INJECTION, SOLUTION INTRAMUSCULAR; INTRAVENOUS AS NEEDED
Status: COMPLETED | OUTPATIENT
Start: 2025-06-28 | End: 2025-06-28

## 2025-06-28 RX ORDER — ACETAMINOPHEN 325 MG/1
975 TABLET ORAL EVERY 8 HOURS SCHEDULED
Status: DISCONTINUED | OUTPATIENT
Start: 2025-06-28 | End: 2025-06-30 | Stop reason: HOSPADM

## 2025-06-28 RX ADMIN — HEPARIN SODIUM IN SODIUM CHLORIDE 20 UNITS/HR: 200 INJECTION INTRAVENOUS at 10:01

## 2025-06-28 RX ADMIN — Medication 10 ML: at 09:07

## 2025-06-28 RX ADMIN — GABAPENTIN 100 MG: 100 CAPSULE ORAL at 17:00

## 2025-06-28 RX ADMIN — HEPARIN SODIUM 400 UNITS/HR: 10000 INJECTION, SOLUTION INTRAVENOUS at 11:18

## 2025-06-28 RX ADMIN — MIDAZOLAM 0.5 MG: 1 INJECTION INTRAMUSCULAR; INTRAVENOUS at 08:52

## 2025-06-28 RX ADMIN — FENTANYL CITRATE 50 MCG: 50 INJECTION INTRAMUSCULAR; INTRAVENOUS at 10:31

## 2025-06-28 RX ADMIN — INSULIN LISPRO 1 UNITS: 100 INJECTION, SOLUTION INTRAVENOUS; SUBCUTANEOUS at 07:04

## 2025-06-28 RX ADMIN — HEPARIN SODIUM 400 UNITS/HR: 10000 INJECTION, SOLUTION INTRAVENOUS at 10:52

## 2025-06-28 RX ADMIN — MIDAZOLAM 1 MG: 1 INJECTION INTRAMUSCULAR; INTRAVENOUS at 09:03

## 2025-06-28 RX ADMIN — MIDAZOLAM 0.5 MG: 1 INJECTION INTRAMUSCULAR; INTRAVENOUS at 09:20

## 2025-06-28 RX ADMIN — ALTEPLASE 0.4 MG/HR: 2.2 INJECTION, POWDER, LYOPHILIZED, FOR SOLUTION INTRAVENOUS at 21:17

## 2025-06-28 RX ADMIN — Medication: at 13:09

## 2025-06-28 RX ADMIN — ATORVASTATIN CALCIUM 80 MG: 80 TABLET, FILM COATED ORAL at 17:00

## 2025-06-28 RX ADMIN — ACETAMINOPHEN 975 MG: 325 TABLET, FILM COATED ORAL at 17:00

## 2025-06-28 RX ADMIN — FENTANYL CITRATE 25 MCG: 50 INJECTION INTRAMUSCULAR; INTRAVENOUS at 08:52

## 2025-06-28 RX ADMIN — FENTANYL CITRATE 25 MCG: 50 INJECTION INTRAMUSCULAR; INTRAVENOUS at 09:20

## 2025-06-28 RX ADMIN — FENTANYL CITRATE 50 MCG: 50 INJECTION INTRAMUSCULAR; INTRAVENOUS at 09:04

## 2025-06-28 RX ADMIN — FENTANYL CITRATE 50 MCG: 50 INJECTION INTRAMUSCULAR; INTRAVENOUS at 09:33

## 2025-06-28 RX ADMIN — SODIUM CHLORIDE 50 ML/HR: 0.9 INJECTION, SOLUTION INTRAVENOUS at 06:41

## 2025-06-28 RX ADMIN — MIDAZOLAM 1 MG: 1 INJECTION INTRAMUSCULAR; INTRAVENOUS at 09:33

## 2025-06-28 RX ADMIN — HYDROMORPHONE HYDROCHLORIDE 0.5 MG: 1 INJECTION, SOLUTION INTRAMUSCULAR; INTRAVENOUS; SUBCUTANEOUS at 12:48

## 2025-06-28 RX ADMIN — ALTEPLASE 0.4 MG/HR: 2.2 INJECTION, POWDER, LYOPHILIZED, FOR SOLUTION INTRAVENOUS at 10:14

## 2025-06-28 RX ADMIN — MIDAZOLAM 0.5 MG: 1 INJECTION INTRAMUSCULAR; INTRAVENOUS at 10:31

## 2025-06-28 NOTE — QUICK NOTE
POST-PROCEDURAL CHECK NOTE:      Subjective:  74 y.o. male Day of Lysis s/p angiogram & RLE lysis of occluded SFA stent w/ Dr. Javier.  Patients pain is poorly controlled and patient notably bending & lifting RLE actively to relieve pain. He denies any nausea, chest pain, or shortness of breath.    Objective:  Vitals:    06/28/25 1035 06/28/25 1040 06/28/25 1045 06/28/25 1122   BP: 131/60 124/61 142/63 139/64   BP Location:       Pulse: 57 72 84 86   Resp: 18 18 18 18   Temp:    97.9 °F (36.6 °C)   SpO2: 98% 99% 99% 97%        Physical Exam:  General: well-developed, well-nourished male laying in bed in NAD, appears uncomfortable  Cardiovascular: grossly well perfused perfused, regular rate. Bilateral palpable femoral pulses. L groin access site with PICC in place infusing TPA without surrounding erythema, edema, firmness, ecchymosis, or pulsatile swelling. Biphasic doppler signals in L PT & peroneal and R popliteal. Absent R DP/PT/peroneal signals.  Lungs: normal WOB on RA. No increased respiratory effort.  Abdomen: Soft, non-tender, non-distended.  Extremities: LLE warm, brisk cap refill distally; knee immobilizer in place. RLE cool and pale in comparison to LLE distal to proximal tibia.  Skin: Warm, dry. No rashes, ecchymoses, or abrasions.    Assessment & Plan  Limb ischemia  73 y/o M w/ PMHx DM, neuropathy, lumbar radiculopathy, ? 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 He presented today to [facility] w/ sudden onset of Right leg numbness, coldness, and pain with transient paralysis. He was temporarily unable to walk. [facility]tt after CTA was performed and revealed occlusion of the previously placed Right SFA stent. His symptoms have now completely resolved and he states he is at his typical baseline with only residual coldness of the right foot. M/S is intact on exam and he denies any pain.  Fibrinogen=319.    Now s/p agram & lysis of RLE with Dr. Javier on 6/28.    R pop signal; absent DP, PT, and peroneal  L PT & peroneal signal; absent DP    Plan:  -Appreciate ICU monitoring while undergoing TPA lysis  -Keep BLE straight; neurovascular checks  -keep NPO, sips w/ meds now   -vascular team to recheck extremity exam with possible diet clearance later this afternoon   -NPO at MN for lysis recheck 6/29  -Continue dPCA  -Continue Heparin gtt  -Continue ASA, statin   -Neurovascular monitoring  -Pain and nausea control prn      Ran Diaz MD  General Surgery  06/28/25  12:22 PM

## 2025-06-28 NOTE — ASSESSMENT & PLAN NOTE
73 y/o M w/ PMHx DM, neuropathy, lumbar radiculopathy, ? 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 He presented today to [facility] w/ sudden onset of Right leg numbness, coldness, and pain with transient paralysis. He was temporarily unable to walk. He was transferred to [facility] on Heparin gtt after CTA was performed and revealed occlusion of the previously placed Right SFA stent. His symptoms have now completely resolved and he states he is at his typical baseline with only residual coldness of the right foot. M/S is intact on exam and he denies any pain. Fibrinogen=319.    Now s/p agram & lysis of RLE with Dr. Javier on 6/28.    R pop signal; absent DP, PT, and peroneal  L PT & peroneal signal; absent DP    Plan:  -Appreciate ICU monitoring while undergoing TPA lysis  -Keep BLE straight; neurovascular checks  -keep NPO, sips w/ meds now   -vascular team to recheck extremity exam with possible diet clearance later this afternoon   -NPO at MN for lysis recheck 6/29  -Continue dPCA  -Continue Heparin gtt  -Continue ASA, statin   -Neurovascular monitoring  -Pain and nausea control prn

## 2025-06-28 NOTE — BRIEF OP NOTE (RAD/CATH)
INTERVENTIONAL RADIOLOGY PROCEDURE NOTE    Date: 6/28/2025    Procedure:   Procedure Summary       Date:  Room / Location:     Anesthesia Start:  Anesthesia Stop:     Procedure:  Diagnosis:     Scheduled Providers:  Responsible Provider:     Anesthesia Type: Not recorded ASA Status: Not recorded          Preoperative diagnosis: No diagnosis found.     Postoperative diagnosis: Same.    Surgeon: Darvin Javier MD     Assistant: None. No qualified resident was available.    Blood loss: minimal    Specimens: none     Findings: Occluded right SFA stent with infusion catheter placed for overnight TPA infusion.  TPA check tomorrow.    Complications: None immediate.    Anesthesia: conscious sedation

## 2025-06-28 NOTE — H&P
H&P - Vascular Surgery   Name: Rick Power 74 y.o. male I MRN: [id number]  Unit/Bed#: ED 21 I Date of Admission: 6/27/2025   Date of Service: 6/27/2025 I Hospital Day: 0     Assessment & Plan  Limb ischemia  73 y/o M w/ PMHx DM, neuropathy, lumbar radiculopathy, ? 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 He presented today to [facility] w/ sudden onset of Right leg numbness, coldness, and pain with transient paralysis. He was temporarily unable to walk. He was transferred to [facility] on Heparin gtt after CTA was performed and revealed occlusion of the previously placed Right SFA stent. His symptoms have now completely resolved and he states he is at his typical baseline with only residual coldness of the right foot. M/S is intact on exam and he denies any pain.    Plan:  -Admit to Vascular Surgery, Dr. Quesada, anticipate >2 MN admission for management of RLE limb ischemia.  -Continue Heparin gtt  -IR consultation for Agram possible lysis/intervention planned for tomorrow  -NPO after MN  -Obtain Fibrinogen and CT head prior to procedure  -Continue ASA, statin   -Neurovascular monitoring     History of Present Illness   Rick Power is a 74 y.o. male with prior history of PAD w/ Right SFA stent in 2021 who presents with sudden onset of RLE numbness, coldness, cramping pain and transient paralysis which occurred at approximately 1230 today. He was transferred to [facility] from the [facility] for vascular evaluation and management of Right SFA stent occlusion which has been documented by CTA prior to transfer. He is currently receiving IV heparin and his symptoms have completely resolved with exception of Right foot coolness. On exam M/S are intact. He denies any pain.    Review of Systems   Constitutional: Negative.    HENT: Negative.     Eyes: Negative.    Respiratory: Negative.     Cardiovascular: Negative.    Gastrointestinal: Negative.    Endocrine:  Negative.    Genitourinary: Negative.    Musculoskeletal:  Positive for gait problem.   Skin:  Positive for pallor.   Allergic/Immunologic: Negative.    Neurological:  Positive for weakness and numbness.   Hematological: Negative.    Psychiatric/Behavioral: Negative.       Medical History Review: I have reviewed the patient's PMH, PSH, Social History, Family History, Meds, and Allergies     Objective :  Temp:  [97 °F (36.1 °C)] 97 °F (36.1 °C)  HR:  [] 94  BP: (128-158)/() 128/68  Resp:  [18-20] 18  SpO2:  [97 %-99 %] 97 %  O2 Device: None (Room air)    I/O       None            Physical Exam  Constitutional:       General: He is not in acute distress.     Appearance: Normal appearance. He is not ill-appearing.   HENT:      Head: Normocephalic and atraumatic.      Nose: Nose normal.      Mouth/Throat:      Mouth: Mucous membranes are moist.      Pharynx: Oropharynx is clear.     Eyes:      Extraocular Movements: Extraocular movements intact.      Conjunctiva/sclera: Conjunctivae normal.       Cardiovascular:      Rate and Rhythm: Normal rate and regular rhythm.      Pulses:           Femoral pulses are 2+ on the right side and 2+ on the left side.       Dorsalis pedis pulses are 0 on the right side and detected w/ Doppler on the left side.        Posterior tibial pulses are 0 on the right side and detected w/ Doppler on the left side.      Heart sounds: Normal heart sounds.      Comments: Right Popliteal doppler signal  Right foot cool, pale, M/S intact, no cyanosis  Pulmonary:      Effort: Pulmonary effort is normal. No respiratory distress.      Breath sounds: Normal breath sounds.   Abdominal:      General: Abdomen is flat. There is no distension.      Palpations: Abdomen is soft.      Tenderness: There is no abdominal tenderness.     Musculoskeletal:         General: No signs of injury. Normal range of motion.      Cervical back: Normal range of motion and neck supple.      Right lower leg: No edema.       Left lower leg: No edema.     Skin:     General: Skin is dry.      Capillary Refill: Capillary refill takes more than 3 seconds. Right foot     Coloration: Skin is pale.      Findings: Rash present. No lesion.      Comments: Right lateral thigh rash dry     Neurological:      General: No focal deficit present.      Mental Status: He is alert and oriented to person, place, and time.      Cranial Nerves: No cranial nerve deficit.      Sensory: No sensory deficit.      Motor: No weakness.     Psychiatric:         Mood and Affect: Mood normal.         Behavior: Behavior normal.         Thought Content: Thought content normal.         Judgment: Judgment normal.            Lab Results: I have reviewed the following results:  Recent Labs     06/27/25  1514 06/27/25  1528   WBC 10.02  --    HGB 16.9  --    HCT 50.4*  --      --    SODIUM 137  --    K 3.8  --    CL 99  --    CO2 27  --    BUN 13  --    CREATININE 0.85  --    GLUC 282*  --    PTT  --  26   INR  --  0.81*   LACTICACID 1.3  --        Imaging Results Review: I personally reviewed the following image studies in PACS and associated radiology reports: CT A. My interpretation of the radiology images/reports is: Right SFA occlusion w/ recon Pop.  Other Study Results Review: No additional pertinent studies reviewed.    VTE Prophylaxis: Heparin

## 2025-06-28 NOTE — CONSULTS
Consultation - Critical Care/ICU   Name: Rick Power 74 y.o. male I MRN: [id number]  Unit/Bed#: ED 21 I Date of Admission: 6/27/2025   Date of Service: 6/28/2025 I Hospital Day: 1   Consults  Physician Requesting Evaluation: Darvin Quesada MD   Reason for Evaluation / Principal Problem: s/p lysis    Please contact the SecureChat role for the Critical Care/ICU service with any questions/concerns.    Assessment & Plan   Active Hospital Problems    Diagnosis Date Noted POA    Limb ischemia 06/27/2025 Yes    Type 2 diabetes mellitus with hyperglycemia, without long-term current use of insulin (HCC) 05/18/2015 Yes     Chronic    Dyslipidemia 05/18/2015 Yes     Chronic      Resolved Hospital Problems   No resolved problems to display.     Assessment & Plan  Limb ischemia  Dyslipidemia  CV:   Diagnosis: RLE ischemia 2/2 R fem/pop artery stent occlusion s/p IR lysis (6/28 [place])/Hx HLD  Plan:   Vascular following   Hep gtt/lysis  Serial cbc/fibrinogen   Neurovasc checks  NPO for lysis re-check in am  ASA/statin  Pain mgmnt  Dilaudid PCA  PRN tylenol/oxy  Type 2 diabetes mellitus with hyperglycemia, without long-term current use of insulin (HCC)  Lab Results   Component Value Date    HGBA1C 10.3 (A) 05/02/2025       Recent Labs     06/27/25  1511 06/28/25  0257 06/28/25  0640   POCGLU 289* 128 150*   Endo:   Diagnosis: DM2  Plan:   Holding home jardiance/januvia  SSI    Blood Sugar Average: Last 72 hrs:  (P) 139         Neuro:   No active issues        Pulm:  No active issues    GI:   No active issues    :   No active issues    F/E/N:   No active issues    Heme/Onc:   No active issues        ID:   No active issues    MSK/Skin:   No active issues  Disposition: Stepdown Level 1    History of Present Illness   Rick Power is a 74 y.o. with PMH PAD s/p R SFA stent 2021, DM2 who presents with RLE numbness/pain/coldness. CTA revealed occlusion of R fem/pop artery stent. Pt underwent IR lysis 6/28 tx to [facility] for  lysis mgmnt.    History obtained from chart review and the patient.  Review of Systems: Review of Systems   Respiratory: Negative.     Cardiovascular: Negative.    Gastrointestinal: Negative.    Musculoskeletal:  Positive for myalgias.   Skin:  Positive for color change and pallor.   Neurological: Negative.        Historical Information   Past Medical History:  No date: Abnormal CT of the abdomen      Comment:  last assessed 9/30/16  No date: Anemia  No date: Diabetes mellitus (HCC)  No date: Iron deficiency anemia      Comment:  last assessed 4/26/16  No date: TIA (transient ischemic attack) Past Surgical History:  10/19/2017: EPIDURAL BLOCK INJECTION; Bilateral      Comment:  Procedure: L5 TRANSFORAMINAL EPIDURAL STEROID INJECTION;               Surgeon: Trev Fajardo DO;  Location: [facility] OR;                 Service: Pain Management   10/29/2021: IR AORTAGRAM WITH RUN-OFF   Current Outpatient Medications   Medication Instructions    acetaminophen (TYLENOL) 500 mg, Oral, Every 6 hours PRN    aspirin-dipyridamole (AGGRENOX)  mg per 12 hr capsule 1 capsule, Oral, Every 12 hours    atorvastatin (LIPITOR) 80 mg, Oral, Daily with dinner    Blood Glucose Monitoring Suppl (OneTouch Verio Reflect) w/Device KIT Check blood sugars once daily. Please substitute with appropriate alternative as covered by patient's insurance. Dx: E11.65    Empagliflozin (JARDIANCE) 25 mg, Oral, Every morning    glucose blood (OneTouch Verio) test strip Check blood sugars once daily. Please substitute with appropriate alternative as covered by patient's insurance. Dx: E11.65    OneTouch Delica Lancets 33G MISC Check blood sugars once daily. Please substitute with appropriate alternative as covered by patient's insurance. Dx: E11.65    sitaGLIPtin (JANUVIA) 100 mg, Oral, Daily    vitamin B-12 (VITAMIN B-12) 1,000 mcg, Oral, Daily    Allergies[1]   Social History[2] Family History[3]       Objective :                   Vitals I/O       Most Recent Min/Max in 24hrs   Temp 97.9 °F (36.6 °C) Temp  Min: 97 °F (36.1 °C)  Max: 97.9 °F (36.6 °C)   Pulse 72 Pulse  Min: 57  Max: 110   Resp 20 Resp  Min: 18  Max: 22   /93 BP  Min: 103/67  Max: 158/74   O2 Sat 99 % SpO2  Min: 97 %  Max: 100 %    No intake or output data in the 24 hours ending 06/28/25 1301    Diet NPO; Sips with meds    Invasive Monitoring           Physical Exam   Physical Exam  Eyes:      Pupils: Pupils are equal, round, and reactive to light.   Skin:     General: Skin is cool and mottled extremities.      Coloration: Skin is pale.      Comments: RLE ankle to toes pallor and dusky no DP or PT. +doppler popliteal, +3 FP    LLE decreased ROM foot knee brace in place L groin catheter no hematoma +PT by doppler no DP   HENT:      Head: Normocephalic and atraumatic.      Mouth/Throat:      Mouth: Mucous membranes are moist.   Cardiovascular:      Rate and Rhythm: Normal rate and regular rhythm.      Pulses: Pulses are See skin exam.      Heart sounds: Normal heart sounds.   Musculoskeletal:         General: No swelling.      Right lower leg: No edema.      Left lower leg: No edema.   Abdominal: General: Bowel sounds are normal. There is no distension.      Palpations: Abdomen is soft.      Tenderness: There is no abdominal tenderness.   Constitutional:       General: He is not in acute distress.  Pulmonary:      Effort: Pulmonary effort is normal. No respiratory distress.      Breath sounds: Normal breath sounds.   Neurological:      General: No focal deficit present.      Mental Status: He is alert and oriented to person, place and time.      GCS: GCS eye subscore is 4. GCS verbal subscore is 5. GCS motor subscore is 6.      Motor: Strength full and intact in all extremities. No motor deficit.          Diagnostic Studies        Lab Results: I have reviewed the following results:     Medications:  Scheduled PRN   aspirin, 81 mg, Daily  atorvastatin, 80 mg, Daily With Dinner  insulin  lispro, 1-5 Units, Q6H HAIDER      acetaminophen, 975 mg, Q8H PRN  calcium carbonate, 1,000 mg, Daily PRN  ondansetron, 4 mg, Q6H PRN  oxyCODONE, 5 mg, Q4H PRN       Continuous    alteplase (CATHFLO) 10 mg in sodium chloride 0.9 % 250 mL infusion, 0.4 mg/hr, Last Rate: 0.4 mg/hr (25 1014)  heparin (porcine), 400 Units/hr, Last Rate: 400 Units/hr (25 1118)  heparin, 20 Units/hr, Last Rate: 20 Units/hr (25 1001)  HYDROmorphone,   sodium chloride, 50 mL/hr, Last Rate: 50 mL/hr (25 0641)         Labs:   CBC    Recent Labs     25  1514 25  0641   WBC 10.02 8.09   HGB 16.9 15.5   HCT 50.4* 46.2    248     BMP    Recent Labs     25  1514 25  0641   SODIUM 137 140   K 3.8 3.8   CL 99 105   CO2 27 24   AGAP 11 11   BUN 13 12   CREATININE 0.85 0.64   CALCIUM 9.3 8.6       Coags    Recent Labs     25  1528 25  2103 25  0258   INR 0.81*  --   --    PTT 26 29 69*        Additional Electrolytes  No recent results       Blood Gas    No recent results  No recent results LFTs  No recent results    Infectious  No recent results  Glucose  Recent Labs     25  1514 25  0641   GLUC 282* 139               [1]   Allergies  Allergen Reactions    Metformin Diarrhea   [2]   Social History  Tobacco Use    Smoking status: Former     Current packs/day: 0.00     Average packs/day: 1 pack/day for 53.7 years (53.7 ttl pk-yrs)     Types: Cigars, Cigarettes     Start date:      Quit date: 2019     Years since quittin.7     Passive exposure: Never    Smokeless tobacco: Never    Tobacco comments:     1 a day   Vaping Use    Vaping status: Never Used   Substance Use Topics    Alcohol use: Yes     Comment: social    Drug use: No   [3] No family history on file.

## 2025-06-28 NOTE — PROCEDURES
Venous Access Line Insertion    Date/Time: 6/28/2025 10:44 AM    Performed by: Yefri Tong RN  Authorized by: Darvin Quesada MD    Patient location:  IR  Consent:     Consent obtained:  Verbal    Consent given by:  Patient    Risks discussed:  Arterial puncture, bleeding, incorrect placement, infection and nerve damage    Alternatives discussed:  No treatment  Universal protocol:     Procedure explained and questions answered to patient or proxy's satisfaction: yes      Immediately prior to procedure, a time out was called: yes      Relevant documents present and verified: yes      Test results available and properly labeled: no      Radiology Images displayed and confirmed.  If images not available, report reviewed: yes      Required blood products, implants, devices, and special equipment available: yes      Site/side marked: yes      Patient identity confirmed:  Verbally with patient and arm band  Pre-procedure details:     Hand hygiene: Hand hygiene performed prior to insertion      Sterile barrier technique: All elements of maximal sterile technique followed      Skin preparation:  ChloraPrep  Procedure details:     Complex Venous Access Line Type: PICC      Complex Venous Access Line Indications: other (comment)      Catheter tip vessel location: atriocaval junction      Orientation:  Left    Location:  Basilic    Procedural supplies:  Single lumen    Catheter size:  4 Fr    Total catheter length (cm):  51    Catheter out on skin (cm):  1    Arm circumference:  26.5    Approach: percutaneous technique used      Patient position:  Flat    Ultrasound image availability:  Images available in PACS    Number of attempts:  1    Successful placement: yes      Landmarks identified: yes      Vessel of catheter tip end:  Chest Xray needed to confirm placement  Anesthesia (see MAR for exact dosages):     Anesthesia method:  Local infiltration    Local anesthetic:  Lidocaine 1% w/o epi    Sedation type:   Moderate (conscious) sedation  Post-procedure details:     Post-procedure:  Dressing applied    Assessment:  Blood return through all ports    Post-procedure complications: none      Patient tolerance of procedure:  Tolerated well, no immediate complications

## 2025-06-28 NOTE — ASSESSMENT & PLAN NOTE
Lab Results   Component Value Date    HGBA1C 10.3 (A) 05/02/2025       Recent Labs     06/27/25  1511 06/28/25  0257 06/28/25  0640   POCGLU 289* 128 150*   Endo:   Diagnosis: DM2  Plan:   Holding home jardiance/januvia  SSI    Blood Sugar Average: Last 72 hrs:  (P) 139

## 2025-06-28 NOTE — SEDATION DOCUMENTATION
Lower extremity angiogram performed by Dr. Javier. Patient tolerated well. Accessed left groin, 5F sheath infusing TPA and heparin. Bedrest for X hours. Report given to ED RN.

## 2025-06-28 NOTE — ED NOTES
Ptt = 69. Heparin infusion remains unchanged at 18u/kg/hr x 65 kg.  Next PTT due at 6/28 @ 9am     Nancy Narvaez RN  06/28/25 0411       Nancy Narvaez RN  06/28/25 0411       Nancy Narvaez RN  06/28/25 0412

## 2025-06-28 NOTE — ASSESSMENT & PLAN NOTE
73 y/o M w/ PMHx DM, neuropathy, lumbar radiculopathy, ? 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 He presented today to [facility] w/ sudden onset of Right leg numbness, coldness, and pain with transient paralysis. He was temporarily unable to walk. [facility] Hep[facility] gtt after CTA was performed and revealed occlusion of the previously placed Right SFA stent. His symptoms have now completely resolved and he states he is at his typical baseline with only residual coldness of the right foot. M/S is intact on exam and he denies any pain.    Plan:  -Admit to Vascular Surgery, Dr. Quesada, anticipate >2 MN admission for management of RLE limb ischemia.  -Continue Heparin gtt  -IR consultation for Agram possible lysis/intervention planned for tomorrow  -NPO after MN  -Obtain Fibrinogen and CT head prior to procedure  -Continue ASA, statin   -Neurovascular monitoring

## 2025-06-28 NOTE — ASSESSMENT & PLAN NOTE
CV:   Diagnosis: RLE ischemia 2/2 R fem/pop artery stent occlusion s/p IR lysis (6/28 Neuwirt)/Hx HLD  Plan:   Vascular following   Hep gtt/lysis  Serial cbc/fibrinogen   Neurovasc checks  NPO for lysis re-check in am  ASA/statin  Pain mgmnt  Dilaudid PCA  PRN tylenol/oxy

## 2025-06-29 ENCOUNTER — APPOINTMENT (INPATIENT)
Dept: RADIOLOGY | Facility: HOSPITAL | Age: 75
DRG: 253 | End: 2025-06-29
Attending: RADIOLOGY
Payer: COMMERCIAL

## 2025-06-29 LAB
ANION GAP SERPL CALCULATED.3IONS-SCNC: 12 MMOL/L (ref 4–13)
ANION GAP SERPL CALCULATED.3IONS-SCNC: 15 MMOL/L (ref 4–13)
APTT PPP: 47 SECONDS (ref 23–34)
BASE EX.OXY STD BLDV CALC-SCNC: 78.5 % (ref 60–80)
BASE EXCESS BLDV CALC-SCNC: -6.5 MMOL/L
BUN SERPL-MCNC: 14 MG/DL (ref 5–25)
BUN SERPL-MCNC: 15 MG/DL (ref 5–25)
CA-I BLD-SCNC: 1.14 MMOL/L (ref 1.12–1.32)
CALCIUM SERPL-MCNC: 7.7 MG/DL (ref 8.4–10.2)
CALCIUM SERPL-MCNC: 7.8 MG/DL (ref 8.4–10.2)
CHLORIDE SERPL-SCNC: 106 MMOL/L (ref 96–108)
CHLORIDE SERPL-SCNC: 108 MMOL/L (ref 96–108)
CO2 SERPL-SCNC: 19 MMOL/L (ref 21–32)
CO2 SERPL-SCNC: 19 MMOL/L (ref 21–32)
CREAT SERPL-MCNC: 0.52 MG/DL (ref 0.6–1.3)
CREAT SERPL-MCNC: 0.56 MG/DL (ref 0.6–1.3)
ERYTHROCYTE [DISTWIDTH] IN BLOOD BY AUTOMATED COUNT: 13.7 % (ref 11.6–15.1)
ERYTHROCYTE [DISTWIDTH] IN BLOOD BY AUTOMATED COUNT: 13.7 % (ref 11.6–15.1)
FIBRINOGEN PPP-MCNC: 182 MG/DL (ref 206–523)
FIBRINOGEN PPP-MCNC: 203 MG/DL (ref 206–523)
GFR SERPL CREATININE-BSD FRML MDRD: 101 ML/MIN/1.73SQ M
GFR SERPL CREATININE-BSD FRML MDRD: 105 ML/MIN/1.73SQ M
GLUCOSE SERPL-MCNC: 174 MG/DL (ref 65–140)
GLUCOSE SERPL-MCNC: 74 MG/DL (ref 65–140)
GLUCOSE SERPL-MCNC: 80 MG/DL (ref 65–140)
GLUCOSE SERPL-MCNC: 85 MG/DL (ref 65–140)
GLUCOSE SERPL-MCNC: 88 MG/DL (ref 65–140)
GLUCOSE SERPL-MCNC: 93 MG/DL (ref 65–140)
HCO3 BLDV-SCNC: 19.5 MMOL/L (ref 24–30)
HCT VFR BLD AUTO: 41 % (ref 36.5–49.3)
HCT VFR BLD AUTO: 41.7 % (ref 36.5–49.3)
HGB BLD-MCNC: 13.5 G/DL (ref 12–17)
HGB BLD-MCNC: 13.6 G/DL (ref 12–17)
LACTATE SERPL-SCNC: 0.5 MMOL/L (ref 0.5–2)
MAGNESIUM SERPL-MCNC: 1.9 MG/DL (ref 1.9–2.7)
MAGNESIUM SERPL-MCNC: 2 MG/DL (ref 1.9–2.7)
MCH RBC QN AUTO: 31.9 PG (ref 26.8–34.3)
MCH RBC QN AUTO: 32.3 PG (ref 26.8–34.3)
MCHC RBC AUTO-ENTMCNC: 32.4 G/DL (ref 31.4–37.4)
MCHC RBC AUTO-ENTMCNC: 33.2 G/DL (ref 31.4–37.4)
MCV RBC AUTO: 97 FL (ref 82–98)
MCV RBC AUTO: 99 FL (ref 82–98)
O2 CT BLDV-SCNC: 15.7 ML/DL
PCO2 BLDV: 41 MM HG (ref 42–50)
PH BLDV: 7.3 [PH] (ref 7.3–7.4)
PHOSPHATE SERPL-MCNC: 3.2 MG/DL (ref 2.3–4.1)
PHOSPHATE SERPL-MCNC: 3.8 MG/DL (ref 2.3–4.1)
PLATELET # BLD AUTO: 174 THOUSANDS/UL (ref 149–390)
PLATELET # BLD AUTO: 184 THOUSANDS/UL (ref 149–390)
PMV BLD AUTO: 9.5 FL (ref 8.9–12.7)
PMV BLD AUTO: 9.6 FL (ref 8.9–12.7)
PO2 BLDV: 44.9 MM HG (ref 35–45)
POTASSIUM SERPL-SCNC: 3.6 MMOL/L (ref 3.5–5.3)
POTASSIUM SERPL-SCNC: 4.1 MMOL/L (ref 3.5–5.3)
RBC # BLD AUTO: 4.21 MILLION/UL (ref 3.88–5.62)
RBC # BLD AUTO: 4.23 MILLION/UL (ref 3.88–5.62)
SODIUM SERPL-SCNC: 139 MMOL/L (ref 135–147)
SODIUM SERPL-SCNC: 140 MMOL/L (ref 135–147)
WBC # BLD AUTO: 9.03 THOUSAND/UL (ref 4.31–10.16)
WBC # BLD AUTO: 9.46 THOUSAND/UL (ref 4.31–10.16)

## 2025-06-29 PROCEDURE — C1757 CATH, THROMBECTOMY/EMBOLECT: HCPCS

## 2025-06-29 PROCEDURE — 80048 BASIC METABOLIC PNL TOTAL CA: CPT

## 2025-06-29 PROCEDURE — 83735 ASSAY OF MAGNESIUM: CPT

## 2025-06-29 PROCEDURE — C1884 EMBOLIZATION PROTECT SYST: HCPCS

## 2025-06-29 PROCEDURE — C1894 INTRO/SHEATH, NON-LASER: HCPCS

## 2025-06-29 PROCEDURE — 99232 SBSQ HOSP IP/OBS MODERATE 35: CPT | Performed by: SURGERY

## 2025-06-29 PROCEDURE — C1769 GUIDE WIRE: HCPCS

## 2025-06-29 PROCEDURE — 37226 PR REVSC OPN/PRQ FEM/POP W/STNT/ANGIOP SM VSL: CPT | Performed by: RADIOLOGY

## 2025-06-29 PROCEDURE — 82948 REAGENT STRIP/BLOOD GLUCOSE: CPT

## 2025-06-29 PROCEDURE — 85384 FIBRINOGEN ACTIVITY: CPT | Performed by: RADIOLOGY

## 2025-06-29 PROCEDURE — 80048 BASIC METABOLIC PNL TOTAL CA: CPT | Performed by: STUDENT IN AN ORGANIZED HEALTH CARE EDUCATION/TRAINING PROGRAM

## 2025-06-29 PROCEDURE — 37214 CESSJ THERAPY CATH REMOVAL: CPT | Performed by: RADIOLOGY

## 2025-06-29 PROCEDURE — C1887 CATHETER, GUIDING: HCPCS

## 2025-06-29 PROCEDURE — C1725 CATH, TRANSLUMIN NON-LASER: HCPCS

## 2025-06-29 PROCEDURE — NC001 PR NO CHARGE: Performed by: NURSE PRACTITIONER

## 2025-06-29 PROCEDURE — 85027 COMPLETE CBC AUTOMATED: CPT | Performed by: RADIOLOGY

## 2025-06-29 PROCEDURE — 37184 PRIM ART M-THRMBC 1ST VSL: CPT | Performed by: RADIOLOGY

## 2025-06-29 PROCEDURE — 82330 ASSAY OF CALCIUM: CPT

## 2025-06-29 PROCEDURE — 83605 ASSAY OF LACTIC ACID: CPT

## 2025-06-29 PROCEDURE — 82805 BLOOD GASES W/O2 SATURATION: CPT

## 2025-06-29 PROCEDURE — 84100 ASSAY OF PHOSPHORUS: CPT

## 2025-06-29 PROCEDURE — 85730 THROMBOPLASTIN TIME PARTIAL: CPT | Performed by: SURGERY

## 2025-06-29 RX ORDER — CLOPIDOGREL BISULFATE 75 MG/1
75 TABLET ORAL DAILY
Status: CANCELLED | OUTPATIENT
Start: 2025-06-29

## 2025-06-29 RX ORDER — MIDAZOLAM HYDROCHLORIDE 2 MG/2ML
INJECTION, SOLUTION INTRAMUSCULAR; INTRAVENOUS AS NEEDED
Status: COMPLETED | OUTPATIENT
Start: 2025-06-29 | End: 2025-06-29

## 2025-06-29 RX ORDER — FENTANYL CITRATE 50 UG/ML
INJECTION, SOLUTION INTRAMUSCULAR; INTRAVENOUS AS NEEDED
Status: COMPLETED | OUTPATIENT
Start: 2025-06-29 | End: 2025-06-29

## 2025-06-29 RX ORDER — OXYCODONE HYDROCHLORIDE 10 MG/1
10 TABLET ORAL EVERY 4 HOURS PRN
Status: DISCONTINUED | OUTPATIENT
Start: 2025-06-29 | End: 2025-06-30 | Stop reason: HOSPADM

## 2025-06-29 RX ORDER — MAGNESIUM SULFATE 1 G/100ML
1 INJECTION INTRAVENOUS ONCE
Status: COMPLETED | OUTPATIENT
Start: 2025-06-29 | End: 2025-06-29

## 2025-06-29 RX ORDER — CEFAZOLIN SODIUM 1 G/50ML
SOLUTION INTRAVENOUS
Status: COMPLETED | OUTPATIENT
Start: 2025-06-29 | End: 2025-06-29

## 2025-06-29 RX ORDER — HYDROMORPHONE HCL/PF 1 MG/ML
0.5 SYRINGE (ML) INJECTION EVERY 2 HOUR PRN
Status: DISCONTINUED | OUTPATIENT
Start: 2025-06-29 | End: 2025-06-30 | Stop reason: HOSPADM

## 2025-06-29 RX ORDER — CLOPIDOGREL BISULFATE 75 MG/1
300 TABLET ORAL ONCE
Status: COMPLETED | OUTPATIENT
Start: 2025-06-29 | End: 2025-06-29

## 2025-06-29 RX ORDER — OXYCODONE HYDROCHLORIDE 5 MG/1
5 TABLET ORAL EVERY 4 HOURS PRN
Status: DISCONTINUED | OUTPATIENT
Start: 2025-06-29 | End: 2025-06-30 | Stop reason: HOSPADM

## 2025-06-29 RX ORDER — AMOXICILLIN 250 MG
1 CAPSULE ORAL
Status: DISCONTINUED | OUTPATIENT
Start: 2025-06-29 | End: 2025-06-30 | Stop reason: HOSPADM

## 2025-06-29 RX ORDER — POTASSIUM CHLORIDE 1500 MG/1
40 TABLET, EXTENDED RELEASE ORAL ONCE
Status: COMPLETED | OUTPATIENT
Start: 2025-06-29 | End: 2025-06-29

## 2025-06-29 RX ORDER — IODIXANOL 320 MG/ML
100 INJECTION, SOLUTION INTRAVASCULAR
Status: COMPLETED | OUTPATIENT
Start: 2025-06-29 | End: 2025-06-29

## 2025-06-29 RX ORDER — CLOPIDOGREL BISULFATE 75 MG/1
75 TABLET ORAL DAILY
Status: DISCONTINUED | OUTPATIENT
Start: 2025-06-30 | End: 2025-06-30 | Stop reason: HOSPADM

## 2025-06-29 RX ADMIN — INSULIN LISPRO 1 UNITS: 100 INJECTION, SOLUTION INTRAVENOUS; SUBCUTANEOUS at 18:00

## 2025-06-29 RX ADMIN — ASPIRIN 81 MG CHEWABLE TABLET 81 MG: 81 TABLET CHEWABLE at 12:14

## 2025-06-29 RX ADMIN — MIDAZOLAM 1 MG: 1 INJECTION INTRAMUSCULAR; INTRAVENOUS at 08:59

## 2025-06-29 RX ADMIN — IOHEXOL 88 ML: 350 INJECTION, SOLUTION INTRAVENOUS at 12:21

## 2025-06-29 RX ADMIN — FENTANYL CITRATE 50 MCG: 50 INJECTION INTRAMUSCULAR; INTRAVENOUS at 10:56

## 2025-06-29 RX ADMIN — POTASSIUM CHLORIDE 40 MEQ: 1500 TABLET, EXTENDED RELEASE ORAL at 01:18

## 2025-06-29 RX ADMIN — FENTANYL CITRATE 50 MCG: 50 INJECTION INTRAMUSCULAR; INTRAVENOUS at 11:20

## 2025-06-29 RX ADMIN — IODIXANOL 50 ML: 320 INJECTION, SOLUTION INTRAVASCULAR at 12:20

## 2025-06-29 RX ADMIN — FENTANYL CITRATE 50 MCG: 50 INJECTION INTRAMUSCULAR; INTRAVENOUS at 09:30

## 2025-06-29 RX ADMIN — MAGNESIUM SULFATE HEPTAHYDRATE 1 G: 1 INJECTION, SOLUTION INTRAVENOUS at 12:00

## 2025-06-29 RX ADMIN — ALTEPLASE 0.4 MG/HR: 2.2 INJECTION, POWDER, LYOPHILIZED, FOR SOLUTION INTRAVENOUS at 05:45

## 2025-06-29 RX ADMIN — ACETAMINOPHEN 975 MG: 325 TABLET, FILM COATED ORAL at 14:31

## 2025-06-29 RX ADMIN — MIDAZOLAM 1 MG: 1 INJECTION INTRAMUSCULAR; INTRAVENOUS at 11:20

## 2025-06-29 RX ADMIN — ACETAMINOPHEN 975 MG: 325 TABLET, FILM COATED ORAL at 21:35

## 2025-06-29 RX ADMIN — FENTANYL CITRATE 50 MCG: 50 INJECTION INTRAMUSCULAR; INTRAVENOUS at 08:59

## 2025-06-29 RX ADMIN — FENTANYL CITRATE 50 MCG: 50 INJECTION INTRAMUSCULAR; INTRAVENOUS at 10:07

## 2025-06-29 RX ADMIN — MIDAZOLAM 1 MG: 1 INJECTION INTRAMUSCULAR; INTRAVENOUS at 10:07

## 2025-06-29 RX ADMIN — CEFAZOLIN SODIUM 2000 MG: 1 SOLUTION INTRAVENOUS at 11:22

## 2025-06-29 RX ADMIN — MIDAZOLAM 1 MG: 1 INJECTION INTRAMUSCULAR; INTRAVENOUS at 10:20

## 2025-06-29 RX ADMIN — SODIUM CHLORIDE 50 ML/HR: 0.9 INJECTION, SOLUTION INTRAVENOUS at 08:00

## 2025-06-29 RX ADMIN — MIDAZOLAM 1 MG: 1 INJECTION INTRAMUSCULAR; INTRAVENOUS at 09:30

## 2025-06-29 RX ADMIN — MIDAZOLAM 1 MG: 1 INJECTION INTRAMUSCULAR; INTRAVENOUS at 10:55

## 2025-06-29 RX ADMIN — ACETAMINOPHEN 975 MG: 325 TABLET, FILM COATED ORAL at 05:46

## 2025-06-29 RX ADMIN — CLOPIDOGREL BISULFATE 300 MG: 75 TABLET, FILM COATED ORAL at 12:14

## 2025-06-29 RX ADMIN — FENTANYL CITRATE 50 MCG: 50 INJECTION INTRAMUSCULAR; INTRAVENOUS at 10:20

## 2025-06-29 NOTE — UTILIZATION REVIEW
Initial Clinical Review    TRANSFER FROM [facility]  ED    Admission: Date/Time/Statement:   Admission Orders (From admission, onward)       Ordered        06/27/25 2015  Inpatient Admission  Once                          Orders Placed This Encounter   Procedures    Inpatient Admission     Standing Status:   Standing     Number of Occurrences:   1     Level of Care:   Med Surg [16]     Estimated length of stay:   More than 2 Midnights     Certification:   I certify that inpatient services are medically necessary for this patient for a duration of greater than two midnights. See H&P and MD Progress Notes for additional information about the patient's course of treatment.     ED Arrival Information       Expected   6/27/2025     Arrival   6/27/2025 19:19    Acuity   Emergent              Means of arrival   Ambulance    Escorted by   [facility]    Service   Vascular Surgery    Admission type   Emergency              Arrival complaint   RLE ischemia             Chief Complaint   Patient presents with    Medical Problem     Pt came from [facility] came due to RLE ischemia. Pedal pulse absent on right foot and +1 on left, limb is cool to touch. Pt is endorsing no pain or sensation currently. Pt states he had pain at 1230 and believes that is when this started.        Initial Presentation: 74 y.o. male initially presented to ED at [facility] with a sudden onset  RLE numbness, coldness, cramping pain  and transient paralysis  the day of admission.  Transferred to [facility] for further care.  PMH is  PAD  w/R  SFA stent in 2021,  CKD, TIA. Admit  Ip with Limb Ischemia  and plan is  IV heparin, IR consult, neurovascular checks  and continue home meds.        Date:    6/28     Day 2:   INTERVENTIONAL RADIOLOGY PROCEDURE NOTE     Date: 6/28/2025  Findings: Occluded right SFA stent with infusion catheter placed for overnight TPA infusion.  TPA check tomorrow.     Critical care consult  Continue neurovascular checks Q 1 hr.    Remain on  IV heparin  and TPA>   Plan repeat angio.  Remain on bedrest for now.      Date:   6/29  Day 3: Has surpassed a 2nd midnight with active treatments and services.  Remains on  IV heparin and TPA.  Continue  IVF and  dilaudid PCA.  Plan lysis re check today.       ED Treatment-Medication Administration from 06/27/2025 1639 to 06/28/2025 1336         Date/Time Order Dose Route Action     06/28/2025 1001 heparin 1000 units in 500 mL infusion (premix) for sheath patency 20 Units/hr Intravenous New Bag     06/28/2025 1014 alteplase (CATHFLO) 10 mg in sodium chloride 0.9 % 250 mL infusion 0.4 mg/hr Intracatheter New Bag     06/28/2025 0641 sodium chloride 0.9 % infusion 50 mL/hr Intravenous New Bag     06/28/2025 0704 insulin lispro (HumALOG/ADMELOG) 100 units/mL subcutaneous injection 1-5 Units 1 Units Subcutaneous Given     06/27/2025 2104 heparin (porcine) 25,000 units in 0.45% NaCl 250 mL infusion (premix) 18 Units/kg/hr Intravenous New Bag     06/28/2025 0852 midazolam (VERSED) injection 0.5 mg Intravenous Given     06/28/2025 0903 midazolam (VERSED) injection 1 mg Intravenous Given     06/28/2025 0920 midazolam (VERSED) injection 0.5 mg Intravenous Given     06/28/2025 0933 midazolam (VERSED) injection 1 mg Intravenous Given     06/28/2025 1031 midazolam (VERSED) injection 0.5 mg Intravenous Given     06/28/2025 0852 fentaNYL injection 25 mcg Intravenous Given     06/28/2025 0904 fentaNYL injection 50 mcg Intravenous Given     06/28/2025 0920 fentaNYL injection 25 mcg Intravenous Given     06/28/2025 0933 fentaNYL injection 50 mcg Intravenous Given     06/28/2025 1031 fentaNYL injection 50 mcg Intravenous Given     06/28/2025 0907 lidocaine 1% buffered 10 mL Infiltration Given     06/28/2025 1052 heparin (porcine) 25,000 units in 0.45% NaCl 250 mL infusion (premix) 400 Units/hr Intravenous New Bag     06/28/2025 1118 heparin (porcine) 25,000 units in 0.45% NaCl 250 mL infusion (premix) 400 Units/hr  Intravenous New Bag     06/28/2025 1309 HYDROmorphone (DILAUDID) 1 mg/mL 50 mL PCA -- Intravenous New Bag     06/28/2025 1248 HYDROmorphone (DILAUDID) injection 0.5 mg 0.5 mg Intravenous Given            Scheduled Medications:  acetaminophen, 975 mg, Oral, Q8H HAIDER  aspirin, 81 mg, Oral, Daily  atorvastatin, 80 mg, Oral, Daily With Dinner  gabapentin, 100 mg, Oral, TID  insulin lispro, 1-5 Units, Subcutaneous, Q6H HAIDER  magnesium sulfate, 1 g, Intravenous, Once  senna-docusate sodium, 1 tablet, Oral, HS      Continuous IV Infusions:  alteplase (CATHFLO) 10 mg in sodium chloride 0.9 % 250 mL infusion, 0.4 mg/hr, Intracatheter, Continuous  heparin (porcine), 400 Units/hr, Intravenous, Titrated  heparin, 20 Units/hr, Intravenous, Continuous  HYDROmorphone, , Intravenous, Continuous  sodium chloride, 50 mL/hr, Intravenous, Continuous      PRN Meds:  calcium carbonate, 1,000 mg, Oral, Daily PRN  fentaNYL, , Intravenous, PRN  midazolam, , , PRN  ondansetron, 4 mg, Intravenous, Q6H PRN  oxyCODONE, 5 mg, Oral, Q4H PRN      ED Triage Vitals   Temperature Pulse Respirations Blood Pressure SpO2 Pain Score   06/28/25 1122 06/27/25 1926 06/27/25 1926 06/27/25 1926 06/27/25 1926 06/27/25 1926   97.9 °F (36.6 °C) 94 18 128/68 97 % No Pain         Vital Signs (last 3 days)       Date/Time Temp Pulse Resp BP MAP (mmHg) SpO2 FiO2 (%) O2 Device Patient Position - Orthostatic VS [place] Coma Scale Score Pain    06/29/25 1020 -- 78 20 147/66 -- 100 % 21 -- -- -- --    06/29/25 1015 -- 65 20 118/57 -- 100 % 21 -- -- -- --    06/29/25 1010 -- 66 20 130/63 -- 100 % 21 -- -- -- --    06/29/25 1005 -- 63 20 119/56 -- 100 % 21 -- -- -- --    06/29/25 1000 -- 60 20 123/57 -- 100 % 21 -- -- -- --    06/29/25 0955 -- 63 20 128/58 -- 100 % 21 -- -- -- --    06/29/25 0950 -- 63 20 120/56 -- 100 % 21 -- -- -- --    06/29/25 0945 -- 62 20 124/55 -- 100 % 21 -- -- -- --    06/29/25 0940 -- 65 20 126/58 -- 100 % 21 -- -- -- --    06/29/25 0935 -- 65 20  114/55 -- 100 % 21 -- -- -- --    06/29/25 0930 -- 67 20 125/61 -- 99 % 21 -- -- -- --    06/29/25 0925 -- 66 20 125/58 -- 98 % 21 -- -- -- --    06/29/25 0920 -- 69 20 133/57 -- 100 % 21 -- -- -- --    06/29/25 0915 -- 68 20 124/60 -- 98 % 21 -- -- -- --    06/29/25 0910 -- 66 20 126/57 -- 99 % 21 -- -- -- --    06/29/25 0905 -- 68 20 119/60 -- 98 % 21 -- -- -- --    06/29/25 0900 -- 66 20 140/63 -- 100 % 21 -- -- -- --    06/29/25 0855 -- 68 20 144/63 -- 100 % 21 -- -- -- --    06/29/25 0850 -- 68 20 126/55 -- 100 % 21 -- -- -- --    06/29/25 0845 -- 67 20 137/66 -- 100 % 21 None (Room air) -- -- --    06/29/25 0800 98 °F (36.7 °C) 82 22 117/56 80 98 % -- None (Room air) -- 15 No Pain    06/29/25 0700 -- 60 49 121/59 85 99 % -- -- -- -- --    06/29/25 0600 -- 64 34 125/60 86 98 % -- -- -- -- --    06/29/25 0500 97.6 °F (36.4 °C) 60 37 124/59 85 98 % -- None (Room air) Lying -- --    06/29/25 0400 97.8 °F (36.6 °C) 60 30 128/62 89 98 % -- None (Room air) Lying 15 --    06/29/25 0300 -- 83 43 139/62 89 99 % -- -- -- -- --    06/29/25 0200 -- 61 12 131/60 86 99 % -- -- -- -- --    06/29/25 0100 97.6 °F (36.4 °C) 62 34 122/58 83 99 % -- -- -- -- --    06/29/25 0000 -- 73 14 120/59 85 99 % -- None (Room air) Lying 15 --    06/28/25 2300 -- 80 24 131/60 87 100 % -- -- -- -- --    06/28/25 2200 -- 62 17 134/60 86 98 % -- -- -- -- --    06/28/25 2100 -- 62 20 119/58 83 99 % -- -- -- -- --    06/28/25 2000 -- 64 46 105/54 76 99 % -- None (Room air) Lying 15 No Pain    06/28/25 1910 -- 77 30 108/58 77 -- -- -- -- -- --    06/28/25 1903 97.9 °F (36.6 °C) -- -- -- -- -- -- -- -- -- --    06/28/25 1700 -- -- -- -- -- -- -- -- -- -- Med Not Given for Pain - for MAR use only    06/28/25 1600 -- -- -- -- -- -- -- -- -- 15 --    06/28/25 1400 -- -- -- -- -- -- -- -- -- 15 --    06/28/25 1309 -- -- -- -- -- -- -- -- -- -- 10 - Worst Possible Pain    06/28/25 1300 98 °F (36.7 °C) 70 26 116/72 89 98 % -- -- -- -- --    06/28/25 1248  -- -- -- -- -- -- -- -- -- -- 10 - Worst Possible Pain    06/28/25 1230 -- 72 20 118/93 102 99 % -- None (Room air) Sitting -- --    06/28/25 1122 97.9 °F (36.6 °C) 86 18 139/64 -- 97 % -- -- -- -- 10 - Worst Possible Pain    06/28/25 1045 -- 84 18 142/63 -- 99 % 21 None (Room air) -- -- --    06/28/25 1040 -- 72 18 124/61 -- 99 % 21 -- -- -- --    06/28/25 1035 -- 57 18 131/60 -- 98 % 21 -- -- -- --    06/28/25 1030 -- 74 18 137/68 -- 100 % 21 -- -- -- --    06/28/25 1025 -- 62 18 151/73 -- 100 % 21 -- -- -- --    06/28/25 1020 -- 69 18 153/71 -- 98 % 21 -- -- -- --    06/28/25 1015 -- 75 18 155/72 -- 99 % 21 -- -- -- --    06/28/25 1010 -- 71 18 130/64 -- 99 % 21 -- -- -- --    06/28/25 1005 -- 78 18 142/67 -- 99 % 21 -- -- -- --    06/28/25 1000 -- 63 18 126/62 -- 97 % 21 -- -- -- --    06/28/25 0955 -- 66 18 141/65 -- 99 % 21 -- -- -- --    06/28/25 0950 -- 59 18 127/61 -- 99 % 21 -- -- -- --    06/28/25 0945 -- 64 18 127/61 -- 99 % 21 -- -- -- --    06/28/25 0940 -- 68 18 128/60 -- 99 % 21 -- -- -- --    06/28/25 0935 -- 85 18 121/58 -- 99 % 21 -- -- -- --    06/28/25 0930 -- 66 18 118/61 -- 99 % 22 -- -- -- --    06/28/25 0925 -- 80 18 118/62 -- 98 % 22 -- -- -- --    06/28/25 0920 -- 67 18 119/57 -- 99 % 22 -- -- -- --    06/28/25 0915 -- 81 18 119/65 -- 99 % 22 -- -- -- --    06/28/25 0910 -- 82 18 123/69 -- 98 % 22 -- -- -- --    06/28/25 0905 -- 83 18 129/71 -- 97 % 22 -- -- -- --    06/28/25 0900 -- 82 18 117/65 -- 99 % 22 -- -- -- --    06/28/25 0855 -- 80 18 133/67 -- 98 % 22 -- -- -- --    06/28/25 0850 -- 75 18 128/61 -- 98 % 22 -- -- -- --    06/28/25 0845 -- 78 18 133/71 -- 98 % 22 None (Room air) -- -- --    06/28/25 0730 -- 68 22 103/67 80 98 % -- None (Room air) Sitting -- --    06/28/25 0635 -- 90 20 109/62 -- 99 % -- -- -- 15 --    06/28/25 0300 -- 67 18 109/54 -- 98 % -- None (Room air) -- 15 --    06/28/25 0150 -- -- -- -- -- -- -- -- -- -- No Pain    06/27/25 1926 -- 94 18 128/68 -- 97 % --  None (Room air) Sitting -- No Pain              Pertinent Labs/Diagnostic Test Results:   Radiology:  CT head wo contrast   Final Interpretation by Clark Wesley DO (06/28 1213)      No acute intracranial abnormality. Mild focal encephalomalacia within the left inferior lateral frontal lobe.                  Workstation performed: [id number]         IR lower extremity angiogram   Final Interpretation by Darvin Javier MD (06/28 9020)   Impression: Successful placement of a 20 cm long 4 [place] of [place] infusion catheter across the occluded right distal superficial femoral artery stent. Patent popliteal artery. Patent single vessel anterior tibial runoff to a patent dorsalis pedis artery in the    foot. The peroneal artery and posterior tibial artery both end at the mid calf. There is no posterior tibial artery reconstitution in the foot.      Plan: Overnight tPA thrombolysis. tPA check tomorrow morning.               Workstation performed: [id number]         IR PICC line placement single lumen (preferred for home antibiotics/medications)   Final Interpretation by Darvin Javier MD (06/28 3323)   1. Status post placement of a 4-Citizen of [place] percutaneously inserted central venous catheter via the left basilic vein with its tip at the cavoatrial junction under ultrasound and fluoroscopic guidance.                           Workstation performed: [id number]         IR TPA lysis check    (Results Pending)     Cardiology:    GI:          Results from last 7 days   Lab Units 06/29/25  0535 06/29/25  0014 06/28/25  1744 06/28/25  1352 06/28/25  0641 06/27/25  1514   WBC Thousand/uL 9.03 9.46 14.90* 15.32* 8.09 10.02   HEMOGLOBIN g/dL 13.5 13.6 14.2 14.8 15.5 16.9   HEMATOCRIT % 41.7 41.0 43.8 44.4 46.2 50.4*   PLATELETS Thousands/uL 174 184 190 191 248 293   TOTAL NEUT ABS Thousands/µL  --   --   --   --   --  7.20         Results from last 7 days   Lab Units 06/29/25  0535 06/29/25  0014 06/28/25  0641 06/27/25  1514    SODIUM mmol/L 139 140 140 137   POTASSIUM mmol/L 4.1 3.6 3.8 3.8   CHLORIDE mmol/L 108 106 105 99   CO2 mmol/L 19* 19* 24 27   ANION GAP mmol/L 12 15* 11 11   BUN mg/dL 14 15 12 13   CREATININE mg/dL 0.52* 0.56* 0.64 0.85   EGFR ml/min/1.73sq m 105 101 96 85   CALCIUM mg/dL 7.7* 7.8* 8.6 9.3   CALCIUM, IONIZED mmol/L  --  1.14  --   --    MAGNESIUM mg/dL 1.9 2.0  --   --    PHOSPHORUS mg/dL 3.2 3.8  --   --          Results from last 7 days   Lab Units 06/29/25  0543 06/29/25  0013 06/28/25  1743 06/28/25  1350 06/28/25  0640 06/28/25  0257 06/27/25  1511   POC GLUCOSE mg/dl 93 85 136 146* 150* 128 289*     Results from last 7 days   Lab Units 06/29/25  0535 06/29/25  0014 06/28/25  0641 06/27/25  1514   GLUCOSE RANDOM mg/dL 88 80 139 282*         Results from last 7 days   Lab Units 06/29/25  0111   PH ESVIN  7.296*   PCO2 ESVIN mm Hg 41.0*   PO2 ESVIN mm Hg 44.9   HCO3 ESVIN mmol/L 19.5*   BASE EXC ESVIN mmol/L -6.5   O2 CONTENT ESVIN ml/dL 15.7   O2 HGB, VENOUS % 78.5         Results from last 7 days   Lab Units 06/27/25  1514   CK TOTAL U/L 53             Results from last 7 days   Lab Units 06/29/25  0414 06/28/25  2119 06/28/25  1352 06/27/25  2103 06/27/25  1528   PROTIME seconds  --   --   --   --  11.7*   INR   --   --   --   --  0.81*   PTT seconds 47* 39* 40*   < > 26    < > = values in this interval not displayed.             Results from last 7 days   Lab Units 06/29/25  0111 06/27/25  1514   LACTIC ACID mmol/L 0.5 1.3         Past Medical History[1]  Present on Admission:   Limb ischemia   Type 2 diabetes mellitus with hyperglycemia, without long-term current use of insulin (HCC)   Dyslipidemia      Admitting Diagnosis: Lower limb ischemia [I99.8]  Limb ischemia [I99.8]  Age/Sex: 74 y.o. male    Network Utilization Review Department  ATTENTION: Please call with any questions or concerns to [phone] and carefully listen to the prompts so that you are directed to the right person. All voicemails are  confidential.   For Discharge needs, contact Care Management DC Support Team at [phone] opt. 2  Send all requests for admission clinical reviews, approved or denied determinations and any other requests to dedicated fax number below belonging to the campus where the patient is receiving treatment. List of dedicated fax numbers for the Facilities:  FACILITY NAME UR FAX NUMBER   ADMISSION DENIALS (Administrative/Medical Necessity) [phone]   DISCHARGE SUPPORT TEAM (NETWORK) [phone]   PARENT CHILD HEALTH (Maternity/NICU/Pediatrics) [phone]   [facility] [phone]   [facility] [phone]   [facility] [phone]   [facility] [phone]   [facility] [phone]   [facility] [phone]   [facility] [phone]   [facility] [phone]   [facility] [phone]   [facility] [phone]   [facility] [phone]   [facility] [phone]             [1]   Past Medical History:  Diagnosis Date    Abnormal CT of the abdomen     last assessed 9/30/16    Anemia     Diabetes mellitus (HCC)     Iron deficiency anemia     last assessed 4/26/16    TIA (transient ischemic attack)

## 2025-06-29 NOTE — PROGRESS NOTES
Progress Note - Vascular Surgery   Name: Rick Power 74 y.o. male I MRN: [id number]  Unit/Bed#: [facility] 516-01 I Date of Admission: 6/27/2025   Date of Service: 6/29/2025 I Hospital Day: 2    Assessment & Plan  Limb ischemia  73 y/o M w/ PMHx DM, neuropathy, lumbar radiculopathy, ? 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 He presented today to [facility] w/ sudden onset of Right leg numbness, coldness, and pain with transient paralysis. He was temporarily unable to walk. He was transferred to [facility] on Heparin gtt after CTA was performed and revealed occlusion of the previously placed Right SFA stent. His symptoms have now completely resolved and he states he is at his typical baseline with only residual coldness of the right foot. M/S is intact on exam and he denies any pain. Fibrinogen=319.    6/28 Agram & lysis of RLE w/ Dr. Javier.  6/29 Lysis recheck w/ re-stenting angioplasty of R SFA w/ Dr. Javier    Strong, biphasic doppler signals restored to R DP & PT vessels on 6/29. L PT signal unchanged.    Plan:  -post-procedural care per IR  -appropriate for downgrade from ICU  -keep BLE straight; neurovascular checks  -okay for diet from vascular surgery perspective  -continue dPCA  -continue ASA, statin  -would encourage mobility with PT/OT  -will discuss with Dr. Quesada additional post-procedural recommendations  Type 2 diabetes mellitus with hyperglycemia, without long-term current use of insulin (HCC)  Lab Results   Component Value Date    HGBA1C 10.3 (A) 05/02/2025     Recent Labs     06/28/25  1743 06/29/25  0013 06/29/25  0543 06/29/25  1218   POCGLU 136 85 93 74     Blood Sugar Average: Last 72 hrs:  (P) 116    -hypoglycemic protocol  -SSI while inpatient  -holding home jardiance & januvia  Dyslipidemia  -continue statin      24 Hour Events : NAEON. Afebrile. VSS however tachypneic overnight without decrease in SpO2. Remained on room air.  450cc of documented UOP.  Subjective : Improved pain control with dPCA yesterday.     Objective :  Temp:  [97.6 °F (36.4 °C)-98 °F (36.7 °C)] 97.8 °F (36.6 °C)  HR:  [57-83] 63  BP: (105-162)/(54-78) 135/63  Resp:  [12-49] 29  SpO2:  [92 %-100 %] 100 %  O2 Device: None (Room air)  FiO2 (%):  [21] 21     I/O         06/27 0701  06/28 0700 06/28 0701  06/29 0700 06/29 0701  06/30 0700    I.V.  1434     IV Piggyback  197.7     Total Intake  1631.6     Urine  450     Total Output  450     Net  +1181.6                  Lines/Drains/Airways       Active Status       Name Placement date Placement time Site Days    PICC Line 06/28/25 06/28/25  1048  --  1                  Physical Exam  Vitals and nursing note reviewed.   Constitutional:       General: He is not in acute distress.     Appearance: Normal appearance. He is well-developed and normal weight.   HENT:      Head: Normocephalic and atraumatic.      Right Ear: External ear normal.      Left Ear: External ear normal.      Nose: Nose normal.      Mouth/Throat:      Mouth: Mucous membranes are moist.     Eyes:      General: No scleral icterus.     Extraocular Movements: Extraocular movements intact.      Conjunctiva/sclera: Conjunctivae normal.       Cardiovascular:      Rate and Rhythm: Normal rate.      Comments: L groin access site hemostatic without surrounding erythema, edema, firmness, ecchymosis, or pulsatile swelling. Biphasic doppler signals in L PT, R DP, & R PT. Right leg symmetrically warm and approximate in color to left leg (improved from before)  Pulmonary:      Effort: Pulmonary effort is normal. No respiratory distress.      Comments: No increased WOB on RA  Abdominal:      General: Abdomen is flat. There is no distension.      Palpations: Abdomen is soft. There is no mass.      Tenderness: There is no abdominal tenderness.     Musculoskeletal:         General: No swelling or tenderness.      Cervical back: Neck supple.      Right lower leg: No edema.  "     Left lower leg: No edema.      Comments: LLE warm, brisk cap refill distally; knee immobilizer in place. RLE cool and pale in comparison to LLE distal to proximal tibia.     Skin:     General: Skin is warm and dry.      Capillary Refill: Capillary refill takes less than 2 seconds.      Findings: No erythema, lesion or rash.     Neurological:      General: No focal deficit present.      Mental Status: He is alert. Mental status is at baseline.     Psychiatric:         Mood and Affect: Mood normal.         Behavior: Behavior normal.         Thought Content: Thought content normal.         Judgment: Judgment normal.         Lab Results: I have reviewed the following results:  CBC with diff:   Lab Results   Component Value Date    WBC 9.03 06/29/2025    HGB 13.5 06/29/2025    HCT 41.7 06/29/2025    MCV 99 (H) 06/29/2025     06/29/2025    RBC 4.23 06/29/2025    MCH 31.9 06/29/2025    MCHC 32.4 06/29/2025    RDW 13.7 06/29/2025    MPV 9.5 06/29/2025    NRBC 0 06/27/2025   ,   BMP/CMP:  Lab Results   Component Value Date    SODIUM 139 06/29/2025    K 4.1 06/29/2025    K 4.7 11/09/2015     06/29/2025     11/09/2015    CO2 19 (L) 06/29/2025    CO2 28.9 11/09/2015    ANIONGAP 8 11/09/2015    BUN 14 06/29/2025    BUN 14 11/09/2015    CREATININE 0.52 (L) 06/29/2025    CREATININE 0.87 11/09/2015    GLUCOSE 155 (H) 11/09/2015    CALCIUM 7.7 (L) 06/29/2025    CALCIUM 8.9 11/09/2015    AST 9 (L) 01/14/2025    AST 12 11/09/2015    ALT 10 01/14/2025    ALT 29 11/09/2015    ALKPHOS 61 01/14/2025    ALKPHOS 49 11/09/2015    PROT 7.3 11/09/2015    BILITOT 0.55 11/09/2015    EGFR 105 06/29/2025   ,   Lipid Panel: No results found for: \"CHOL\",   Coags:   Lab Results   Component Value Date    PTT 47 (H) 06/29/2025    INR 0.81 (L) 06/27/2025   ,   Blood Culture: No results found for: \"BLOODCX\",   Urinalysis:   Lab Results   Component Value Date    COLORU Yellow 09/06/2016    CLARITYU Transparent 09/06/2016    " "SPECGRAV 1.030 09/06/2016    PHUR 5.5 09/06/2016    LEUKOCYTESUR negative 09/06/2016    NITRITE negative 09/06/2016    PROTEINUA negative 09/06/2016    KETONESU 1+ 09/06/2016    BILIRUBINUR negative 09/06/2016    BLOODU negative 09/06/2016   ,   Urine Culture: No results found for: \"URINECX\",   Wound Culure: No results found for: \"WOUNDCULT\"    Imaging Results Review: I reviewed radiology reports from this admission including: procedure reports.  Other Study Results Review: No additional pertinent studies reviewed.    VTE Prophylaxis: VTE covered by:    None      "

## 2025-06-29 NOTE — BRIEF OP NOTE (RAD/CATH)
INTERVENTIONAL RADIOLOGY PROCEDURE NOTE    Date: 6/29/2025    Procedure: IR TPA LYSIS CHECK     Preoperative diagnosis:   1. Limb ischemia       Postoperative diagnosis: Same.    Surgeon: Darvin Javier MD     Assistant: None. No qualified resident was available.    Blood loss: minimal    Specimens: none    Findings: Successful TPA lysis of the occluded right SFA stent with severe focal intrastent stenosis seen and treated with angioplasty and re-stenting with a covered Viabahn stent.  Right femoral, superficial femoral and popliteal arteries now widely patent with single vessel anterior tibial and dorsalis pedis run-off as previously described.    Plan: Bedrest 6 hours.  Discontinue heparin drip.  ASA, Plavix, atorvastatin and vascular surgery outpatient follow-up with a new baseline arterial duplex in 2 weeks.    Complications: None immediate.    Anesthesia: conscious sedation

## 2025-06-29 NOTE — PROGRESS NOTES
Progress Note - Critical Care/ICU   Name: Rick Power 74 y.o. male I MRN: [id number]  Unit/Bed#: [facility] 516-01 I Date of Admission: 6/27/2025   Date of Service: 6/29/2025 I Hospital Day: 2       Critical Care Interval Transfer Note:    Brief Hospital Summary:   74 y.o. with PMH PAD s/p R SFA stent 2021, DM2 who presents with RLE numbness/pain/coldness. CTA revealed occlusion of R fem/pop artery stent. Pt underwent IR lysis 6/28 tx to SD1 for lysis mgmnt. 6/29 lysis recheck R SFA angio/restenting.     Barriers to discharge:   Post lysis f/u  PT     Consults: IP CONSULT TO SURGICAL CRITICAL CARE    Recommended to review admission imaging for incidental findings and document in discharge navigator: Chart reviewed, no known incidental findings noted at this time.      Discharge Plan: Anticipate discharge in 48-72 hrs to pending pt  Central access plan: Patient requires PICC secondary to lysis likely remove in am    PT Recommendations: Home with outpatient rehabilitationOT Recommendations: Home with home health rehabilitation   Patient seen and evaluated by Critical Care today and deemed to be appropriate for transfer to Med Surg. Spoke to Vernon from [facility] to accept transfer. Critical care can be contacted via SecureChat with any questions or concerns. Please use the Critical Care AP Role in Secure Chat for any provider inquires until the patient is transferred out of the ICU or until tomorrow at 0600.

## 2025-06-29 NOTE — ASSESSMENT & PLAN NOTE
75 y/o M w/ PMHx DM, neuropathy, lumbar radiculopathy, ? 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 He presented today to [facility] w/ sudden onset of Right leg numbness, coldness, and pain with transient paralysis. He was temporarily unable to walk. He was transferred to [facility] on Heparin gtt after CTA was performed and revealed occlusion of the previously placed Right SFA stent. His symptoms have now completely resolved and he states he is at his typical baseline with only residual coldness of the right foot. M/S is intact on exam and he denies any pain. Fibrinogen=319.    6/28 Agram & lysis of RLE w/ Dr. Javier.  6/29 Lysis recheck w/ re-stenting angioplasty of R SFA w/ Dr. Javier    Strong, biphasic doppler signals restored to R DP & PT vessels on 6/29. L PT signal unchanged.    Plan:  -post-procedural care per IR  -appropriate for downgrade from ICU  -keep BLE straight; neurovascular checks  -okay for diet from vascular surgery perspective  -continue dPCA  -continue ASA, statin  -would encourage mobility with PT/OT  -will discuss with Dr. Quesada additional post-procedural recommendations

## 2025-06-29 NOTE — ASSESSMENT & PLAN NOTE
Lab Results   Component Value Date    HGBA1C 10.3 (A) 05/02/2025     Recent Labs     06/28/25  1743 06/29/25  0013 06/29/25  0543 06/29/25  1218   POCGLU 136 85 93 74     Blood Sugar Average: Last 72 hrs:  (P) 116    -hypoglycemic protocol  -SSI while inpatient  -holding home jardiance & januvia

## 2025-06-29 NOTE — CASE MANAGEMENT
Case Management Assessment & Discharge Planning Note    Patient name Rick Power  Location [facility] 516/[facility] 516-01 MRN [id number]  : 1950 Date 2025       Current Admission Date: 2025  Current Admission Diagnosis:Limb ischemia   Patient Active Problem List    Diagnosis Date Noted    Limb ischemia 2025    Depression, recurrent (HCC) 10/27/2023    Primary osteoarthritis of left hip 2023    Positive depression screening 2023    Immunization counseling 2022    Colon cancer screening declined 2022    Lung cancer screening declined by patient 2022    Immunization refused 10/11/2022    Hip pain, chronic, left 2022    Side effect of medication 2022    Former smoker 2021    Claudication of right lower extremity ([facility]) 2021    Foot drop, bilateral 2018    Low vitamin B12 level 2018    Neuropathy, peripheral, idiopathic 2017    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    Pulmonary nodule seen on imaging study 2017    Ambulatory dysfunction 10/24/2017    Lumbar radiculopathy 10/06/2017    Type 2 diabetes mellitus with hyperglycemia, without long-term current use of insulin ([facility]) 2015    Diabetic polyneuropathy associated with type 2 diabetes mellitus ([facility]) 2015    Dyslipidemia 2015    Transient cerebral ischemia 2015      LOS (days): 2  Geometric Mean LOS (GMLOS) (days):   Days to GMLOS:     OBJECTIVE:    Risk of Unplanned Readmission Score: 12.26         Current admission status: Inpatient       Preferred Pharmacy:   [facility]/pharmacy #[id number] - [place] - [address]  [place] [address]  Phone: [phone] Fax: [phone]    Primary Care Provider: Darvin Keith PA-C    Primary Insurance: JULIA  REP  Secondary Insurance:     ASSESSMENT:  Active Health Care Proxies    There are no active Health Care Proxies on file.                      Patient Information  Admitted  from:: Home  Mental Status: Alert  During Assessment patient was accompanied by: Sister  Assessment information provided by:: Patient  Primary Caregiver: Self  Support Systems: Daughter  What city do you live in?: [place]  Type of Current Residence: Other (Comment) (private residence)  Living Arrangements: Lives Alone    Activities of Daily Living Prior to Admission  Functional Status: Independent  Completes ADLs independently?: Yes  Ambulates independently?: Yes  Does patient use assisted devices?: Yes  Assisted Devices (DME) used: Other (Comment) (SPC)  Does patient currently own DME?: Yes  What DME does the patient currently own?: Other (Comment) (SPC)  Does patient have a history of Outpatient Therapy (PT/OT)?: Yes ([facility])  Does the patient have a history of Short-Term Rehab?: No  Does patient have a history of HHC?: No  Does patient currently have HHC?: No         Patient Information Continued  Does patient have prescription coverage?: Yes  Can the patient afford their medications and any related supplies (such as glucometers or test strips)?: N/A  Does patient receive dialysis treatments?: No         Means of Transportation  Means of Transport to Appts:: Drives Self          DISCHARGE DETAILS:    Discharge planning discussed with:: Pt and family at bedside  Freedom of Choice: Yes  Comments - Freedom of Choice: Choice reviewed  CM contacted family/caregiver?: Yes             Contacts  Patient Contacts: Roxane Tong [phone] (Mobile)  Relationship to Patient:: Family                                                                        Additional Comments: Initial CM assessment completed with pt at bedside. Pt lives alone, has family support. Hx of OP PT recently for 6 wks, does not feel that he gained much  from attending the therapy program. Pt would be open to [facility] PT if recommended for d/c. CM dept following for dcp coordination.

## 2025-06-29 NOTE — SEDATION DOCUMENTATION
Lysis recheck performed by Dr. Javier. Patient tolerated well. Accessed existing 5F sheath, angiojet used, closed with Perclose. Bedrest starting at 1145. Report given to SOHEILA MENESES.

## 2025-06-29 NOTE — PROGRESS NOTES
Progress Note - Critical Care/ICU   Name: Rick Power 74 y.o. male I MRN: [id number]  Unit/Bed#: [facility] 516-01 I Date of Admission: 6/27/2025   Date of Service: 6/29/2025 I Hospital Day: 2      Assessment & Plan   Active Hospital Problems    Diagnosis Date Noted POA    Limb ischemia 06/27/2025 Yes    Type 2 diabetes mellitus with hyperglycemia, without long-term current use of insulin (HCC) 05/18/2015 Yes     Chronic    Dyslipidemia 05/18/2015 Yes     Chronic      Resolved Hospital Problems   No resolved problems to display.       Neuro:   Diagnosis: Post procedure pain  Plan:   CAM-ICU daily  Regulate sleep-wake cycle   Frequent reorientation to avoid ICU delirium   Frequent neurovascular checks  Analgesia: Dilaudid 1 mg/ml 50 ml PCA, gabapentin 100 mg TID, oxycodone 5 mg Q4h PRN, scheduled Tylenol 975 mg Q8h      CV:   Diagnosis: RLE ischemia 2/2 R fem/pop artery stent occlusion, Hx HLD  6/28 Angiogram & RLE lysis of occluded SFA stent   Plan:   Vascular following   Continue heparin gtt & heparin infusion for sheath patency, alteplase 10 mg intracatheter infusion  Serial cbc/fibrinogen   Frequent neurovasc checks  NPO for lysis re-check 6/29  Continue ASA/statin  Pain mgmnt  Dilaudid PCA  PRN tylenol/oxy  Maintain MAP> 65    Pulm:  Diagnosis: No active issues  Plan:   Maintain oxygen saturation >92%      GI:   Diagnosis: No active issues  Plan:   Monitor for bowel function.      :   Diagnosis: No active issues.  Urine output 450 cc  BUN/Cr 14/0.52  Plan:   Monitor I's and O's daily       F/E/N:   Fluids: NaCl @ 50 cc/hr  Electrolytes: Replete as needed for K>4, Phos>3, and Mag >2  Nutrition: NPO for lysis recheck      Heme/Onc:   Diagnosis: No active issues  Hb: 13.5-13.6-14.2   Plan:   Daily CBC   DVT ppx: heparin gtt      Endo:   Diagnosis: Hx T2DM  Glucose 36--146-150  Plan:   BG goal 140-180  Holding home empagliflozin, sitagliptin  Sliding scale insulin      ID:   Diagnosis: No active issues  Wbc  9.03 from 9.46  Plan:   Continue to monitor fever curve and WBC trend       MSK/Skin:   Diagnosis: No active issues  Plan:   PT/OT when appropriate  q2 hour positioning   Offloading pressure points     LDA: PICC, arterial sheath left femoral, PIV x2    Disposition: Critical care    ICU Core Measures     A: Assess, Prevent, and Manage Pain Has pain been assessed? Yes  Need for changes to pain regimen? No   B: Both SAT/SAT  N/A   C: Choice of Sedation RASS Goal: 0 Alert and Calm  Need for changes to sedation or analgesia regimen? No   D: Delirium CAM-ICU: Negative   E: Early Mobility  Plan for early mobility? Yes   F: Family Engagement Plan for family engagement today? Yes         Prophylaxis:  VTE VTE covered by:  heparin (porcine), Intravenous, 800 Units/hr at 06/28/25 2210  heparin, Intravenous, 20 Units/hr at 06/28/25 1001       Stress Ulcer  not ordered         24 Hour Events : 6/28 Angiogram & RLE lysis of occluded SFA stent     Subjective : Patient seen at bedside, not in acute distress.    Review of Systems: See HPI for Review of Systems    Objective :                   Vitals I/O      Most Recent Min/Max in 24hrs   Temp 97.6 °F (36.4 °C) Temp  Min: 97.6 °F (36.4 °C)  Max: 98 °F (36.7 °C)   Pulse 64 Pulse  Min: 57  Max: 86   Resp (!) 34 Resp  Min: 12  Max: 46   /60 BP  Min: 103/67  Max: 155/72   O2 Sat 98 % SpO2  Min: 97 %  Max: 100 %      Intake/Output Summary (Last 24 hours) at 6/29/2025 0656  Last data filed at 6/29/2025 0600  Gross per 24 hour   Intake 1631.63 ml   Output 450 ml   Net 1181.63 ml       Diet NPO; Sips with meds    Invasive Monitoring           Physical Exam   Physical Exam  Vitals and nursing note reviewed.   Eyes:      Conjunctiva/sclera: Conjunctivae normal.   HENT:      Head: Normocephalic and atraumatic.      Right Ear: Hearing normal.      Left Ear: Hearing normal.   Cardiovascular:      Rate and Rhythm: Normal rate.      Pulses: Pulses are Bounding R DP signal, bonding L PT  signal, motor in toes intact, sensory intact.   Abdominal: General: There is no distension.      Palpations: Abdomen is soft.      Tenderness: There is no abdominal tenderness.   Constitutional:       General: He is not in acute distress.  Pulmonary:      Effort: Pulmonary effort is normal. No respiratory distress.   Neurological:      General: No focal deficit present.      Mental Status: He is alert and oriented to person, place and time.          Diagnostic Studies        Lab Results: I have reviewed the following results:     Medications:  Scheduled PRN   acetaminophen, 975 mg, Q8H HAIDER  aspirin, 81 mg, Daily  atorvastatin, 80 mg, Daily With Dinner  gabapentin, 100 mg, TID  insulin lispro, 1-5 Units, Q6H HAIDER      calcium carbonate, 1,000 mg, Daily PRN  ondansetron, 4 mg, Q6H PRN  oxyCODONE, 5 mg, Q4H PRN       Continuous    alteplase (CATHFLO) 10 mg in sodium chloride 0.9 % 250 mL infusion, 0.4 mg/hr, Last Rate: 0.4 mg/hr (06/29/25 0545)  heparin (porcine), 400 Units/hr, Last Rate: 800 Units/hr (06/28/25 2210)  heparin, 20 Units/hr, Last Rate: 20 Units/hr (06/28/25 1001)  HYDROmorphone,   sodium chloride, 50 mL/hr, Last Rate: 50 mL/hr (06/28/25 0641)         Labs:   CBC    Recent Labs     06/29/25  0014 06/29/25  0535   WBC 9.46 9.03   HGB 13.6 13.5   HCT 41.0 41.7    174     BMP    Recent Labs     06/29/25  0014 06/29/25  0535   SODIUM 140 139   K 3.6 4.1    108   CO2 19* 19*   AGAP 15* 12   BUN 15 14   CREATININE 0.56* 0.52*   CALCIUM 7.8* 7.7*       Coags    Recent Labs     06/27/25  1528 06/27/25  2103 06/28/25  2119 06/29/25  0414   INR 0.81*  --   --   --    PTT 26   < > 39* 47*    < > = values in this interval not displayed.        Additional Electrolytes  Recent Labs     06/29/25  0014 06/29/25  0535   MG 2.0 1.9   PHOS 3.8 3.2   CAIONIZED 1.14  --           Blood Gas    No recent results  Recent Labs     06/29/25  0111   PHVEN 7.296*   [id number] 41.0*   PO2VEN 44.9   JRV2NXN 19.5*   BEVEN -6.5    R1DMMUA 78.5    LFTs  No recent results    Infectious  No recent results  Glucose  Recent Labs     06/27/25  1514 06/28/25  0641 06/29/25  0014 06/29/25  0535   GLUC 282* 139 80 88

## 2025-06-29 NOTE — PROGRESS NOTES
Vascular Surgery    Update Post-procedure check:    Pt seen and evaluated on several occurences this evening. Exam and symptoms have markedly improved over the past several hours. Right foot is warmer and pallor/cyanosis of the forefoot and toes is almost completely resolved. Motor of toes and ankle dorsi/plantar flexion is improved and sensation remains intact. Earlier patient was having right calf pain but he no longer reports any pain and is resting comfortably. No calf pain on compression on exam and calf is soft and pliable. Currently AT signal present by doppler.

## 2025-06-30 ENCOUNTER — TRANSITIONAL CARE MANAGEMENT (OUTPATIENT)
Dept: FAMILY MEDICINE CLINIC | Facility: CLINIC | Age: 75
End: 2025-06-30

## 2025-06-30 PROBLEM — R65.10 SIRS (SYSTEMIC INFLAMMATORY RESPONSE SYNDROME) (HCC): Status: ACTIVE | Noted: 2025-06-30

## 2025-06-30 LAB
ANION GAP SERPL CALCULATED.3IONS-SCNC: 7 MMOL/L (ref 4–13)
BUN SERPL-MCNC: 9 MG/DL (ref 5–25)
CALCIUM SERPL-MCNC: 8 MG/DL (ref 8.4–10.2)
CHLORIDE SERPL-SCNC: 107 MMOL/L (ref 96–108)
CO2 SERPL-SCNC: 23 MMOL/L (ref 21–32)
CREAT SERPL-MCNC: 0.5 MG/DL (ref 0.6–1.3)
ERYTHROCYTE [DISTWIDTH] IN BLOOD BY AUTOMATED COUNT: 13.2 % (ref 11.6–15.1)
GFR SERPL CREATININE-BSD FRML MDRD: 106 ML/MIN/1.73SQ M
GLUCOSE SERPL-MCNC: 113 MG/DL (ref 65–140)
GLUCOSE SERPL-MCNC: 123 MG/DL (ref 65–140)
GLUCOSE SERPL-MCNC: 150 MG/DL (ref 65–140)
GLUCOSE SERPL-MCNC: 167 MG/DL (ref 65–140)
HCT VFR BLD AUTO: 41.7 % (ref 36.5–49.3)
HGB BLD-MCNC: 14 G/DL (ref 12–17)
MCH RBC QN AUTO: 32 PG (ref 26.8–34.3)
MCHC RBC AUTO-ENTMCNC: 33.6 G/DL (ref 31.4–37.4)
MCV RBC AUTO: 95 FL (ref 82–98)
PLATELET # BLD AUTO: 170 THOUSANDS/UL (ref 149–390)
PMV BLD AUTO: 9.4 FL (ref 8.9–12.7)
POTASSIUM SERPL-SCNC: 3.8 MMOL/L (ref 3.5–5.3)
RBC # BLD AUTO: 4.37 MILLION/UL (ref 3.88–5.62)
SODIUM SERPL-SCNC: 137 MMOL/L (ref 135–147)
WBC # BLD AUTO: 8.48 THOUSAND/UL (ref 4.31–10.16)

## 2025-06-30 PROCEDURE — 80048 BASIC METABOLIC PNL TOTAL CA: CPT | Performed by: NURSE PRACTITIONER

## 2025-06-30 PROCEDURE — 82948 REAGENT STRIP/BLOOD GLUCOSE: CPT

## 2025-06-30 PROCEDURE — 99239 HOSP IP/OBS DSCHRG MGMT >30: CPT | Performed by: FAMILY MEDICINE

## 2025-06-30 PROCEDURE — NC001 PR NO CHARGE: Performed by: SURGERY

## 2025-06-30 PROCEDURE — 85027 COMPLETE CBC AUTOMATED: CPT | Performed by: NURSE PRACTITIONER

## 2025-06-30 RX ORDER — CLOPIDOGREL BISULFATE 75 MG/1
75 TABLET ORAL DAILY
Qty: 30 TABLET | Refills: 0 | Status: SHIPPED | OUTPATIENT
Start: 2025-07-01 | End: 2025-07-10 | Stop reason: SDUPTHER

## 2025-06-30 RX ORDER — ASPIRIN 81 MG/1
81 TABLET, CHEWABLE ORAL DAILY
Qty: 30 TABLET | Refills: 0 | Status: SHIPPED | OUTPATIENT
Start: 2025-07-01

## 2025-06-30 RX ADMIN — INSULIN LISPRO 1 UNITS: 100 INJECTION, SOLUTION INTRAVENOUS; SUBCUTANEOUS at 01:55

## 2025-06-30 RX ADMIN — ASPIRIN 81 MG CHEWABLE TABLET 81 MG: 81 TABLET CHEWABLE at 08:35

## 2025-06-30 RX ADMIN — CLOPIDOGREL BISULFATE 75 MG: 75 TABLET, FILM COATED ORAL at 08:35

## 2025-06-30 NOTE — ASSESSMENT & PLAN NOTE
73 y/o M w/ PMHx DM, neuropathy, lumbar radiculopathy, ? 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 He presented today to [facility] w/ sudden onset of Right leg numbness, coldness, and pain with transient paralysis. He was temporarily unable to walk. He was transferred to [facility] on Heparin gtt after CTA was performed and revealed occlusion of the previously placed Right SFA stent. His symptoms have now completely resolved and he states he is at his typical baseline with only residual coldness of the right foot. M/S is intact on exam and he denies any pain. Fibrinogen=319.    6/28 Agram & lysis of RLE w/ Dr. Javier.  6/29 Lysis recheck w/ re-stenting angioplasty of R SFA w/ Dr. Javier    Strong, biphasic doppler signals restored to R DP & PT vessels--palpable POD#1. L PT signal unchanged.    Plan:  -patient clear to mobiliz; would encourage mobility with PT/OT  -L groin access point hemostatic  -okay for diet from vascular surgery perspective  -continue ASA, plavix, statin  -from vascular perspective appropriate for discharge

## 2025-06-30 NOTE — PROGRESS NOTES
Progress Note - Vascular Surgery   Name: Rick Power 74 y.o. male I MRN: [id number]  Unit/Bed#: [facility] 509-01 I Date of Admission: 6/27/2025   Date of Service: 6/30/2025 I Hospital Day: 3    Assessment & Plan  Limb ischemia  75 y/o M w/ PMHx DM, neuropathy, lumbar radiculopathy, ? 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 He presented today to [facility] w/ sudden onset of Right leg numbness, coldness, and pain with transient paralysis. He was temporarily unable to walk. He was transferred to [facility] on Heparin gtt after CTA was performed and revealed occlusion of the previously placed Right SFA stent. His symptoms have now completely resolved and he states he is at his typical baseline with only residual coldness of the right foot. M/S is intact on exam and he denies any pain. Fibrinogen=319.    6/28 Agram & lysis of RLE w/ Dr. Javier.  6/29 Lysis recheck w/ re-stenting angioplasty of R SFA w/ Dr. Javier    Strong, biphasic doppler signals restored to R DP & PT vessels--palpable POD#1. L PT signal unchanged.    Plan:  -patient clear to mobiliz; would encourage mobility with PT/OT  -L groin access point hemostatic  -okay for diet from vascular surgery perspective  -continue ASA, plavix, statin  -from vascular perspective appropriate for discharge  Type 2 diabetes mellitus with hyperglycemia, without long-term current use of insulin ([facility])  Lab Results   Component Value Date    HGBA1C 10.3 (A) 05/02/2025     Recent Labs     06/29/25  1218 06/29/25  1641 06/30/25  0003 06/30/25  0531   POCGLU 74 174* 167* 113     Blood Sugar Average: Last 72 hrs:  (P) 126.6    -hypoglycemic protocol  -SSI while inpatient  -holding home jardiance & januvia  Dyslipidemia  -continue statin      24 Hour Events : NAEON. POD#1 from lysis recheck and new stent. Afebrile. VSS on RA. Appropriate UOP.  Subjective : Patient reports resolution of pain. Feeling comfortable this  morning and interested in going home. No nausea/vomiting with diet.     Objective :  Temp:  [97.8 °F (36.6 °C)-98.3 °F (36.8 °C)] 98.2 °F (36.8 °C)  HR:  [57-88] 88  BP: (109-162)/(51-78) 109/51  Resp:  [16-33] 16  SpO2:  [92 %-100 %] 99 %  O2 Device: None (Room air)  FiO2 (%):  [21] 21     I/O         06/28 0701  06/29 0700 06/29 0701  06/30 0700    P.O.  120    I.V. 1434 385.5    IV Piggyback 197.7 128.7    Total Intake 1631.6 634.2    Urine 450 1050    Total Output 450 1050    Net +1181.6 -415.8                Lines/Drains/Airways       Active Status       Name Placement date Placement time Site Days    PICC Line 06/28/25 06/28/25  1048  --  1                  Physical Exam  Vitals and nursing note reviewed.   Constitutional:       General: He is not in acute distress.     Appearance: Normal appearance. He is well-developed and normal weight.   HENT:      Head: Normocephalic and atraumatic.      Right Ear: External ear normal.      Left Ear: External ear normal.      Nose: Nose normal.      Mouth/Throat:      Mouth: Mucous membranes are moist.     Eyes:      General: No scleral icterus.     Extraocular Movements: Extraocular movements intact.      Conjunctiva/sclera: Conjunctivae normal.       Cardiovascular:      Rate and Rhythm: Normal rate.      Comments: L groin access site hemostatic without surrounding erythema, edema, firmness, ecchymosis, or pulsatile swelling. Biphasic doppler signals in L PT, R DP, & R PT. Right leg symmetrically warm and approximate in color to left leg (improved from before)  Pulmonary:      Effort: Pulmonary effort is normal. No respiratory distress.      Comments: No increased WOB on RA  Abdominal:      General: Abdomen is flat. There is no distension.      Palpations: Abdomen is soft. There is no mass.      Tenderness: There is no abdominal tenderness.     Musculoskeletal:         General: No swelling or tenderness.      Cervical back: Neck supple.      Right lower leg: No edema.  "     Left lower leg: No edema.      Comments: BLE warm, brisk cap refill distally     Skin:     General: Skin is warm and dry.      Capillary Refill: Capillary refill takes less than 2 seconds.      Findings: No erythema, lesion or rash.     Neurological:      General: No focal deficit present.      Mental Status: He is alert. Mental status is at baseline.     Psychiatric:         Mood and Affect: Mood normal.         Behavior: Behavior normal.         Thought Content: Thought content normal.         Judgment: Judgment normal.           Lab Results: I have reviewed the following results:  CBC with diff:   Lab Results   Component Value Date    WBC 8.48 06/30/2025    HGB 14.0 06/30/2025    HCT 41.7 06/30/2025    MCV 95 06/30/2025     06/30/2025    RBC 4.37 06/30/2025    MCH 32.0 06/30/2025    MCHC 33.6 06/30/2025    RDW 13.2 06/30/2025    MPV 9.4 06/30/2025    NRBC 0 06/27/2025   ,   BMP/CMP:  Lab Results   Component Value Date    SODIUM 139 06/29/2025    K 4.1 06/29/2025    K 4.7 11/09/2015     06/29/2025     11/09/2015    CO2 19 (L) 06/29/2025    CO2 28.9 11/09/2015    ANIONGAP 8 11/09/2015    BUN 14 06/29/2025    BUN 14 11/09/2015    CREATININE 0.52 (L) 06/29/2025    CREATININE 0.87 11/09/2015    GLUCOSE 155 (H) 11/09/2015    CALCIUM 7.7 (L) 06/29/2025    CALCIUM 8.9 11/09/2015    AST 9 (L) 01/14/2025    AST 12 11/09/2015    ALT 10 01/14/2025    ALT 29 11/09/2015    ALKPHOS 61 01/14/2025    ALKPHOS 49 11/09/2015    PROT 7.3 11/09/2015    BILITOT 0.55 11/09/2015    EGFR 105 06/29/2025   ,   Lipid Panel: No results found for: \"CHOL\",   Coags:   Lab Results   Component Value Date    PTT 47 (H) 06/29/2025    INR 0.81 (L) 06/27/2025   ,   Blood Culture: No results found for: \"BLOODCX\",   Urinalysis:   Lab Results   Component Value Date    COLORU Yellow 09/06/2016    CLARITYU Transparent 09/06/2016    SPECGRAV 1.030 09/06/2016    PHUR 5.5 09/06/2016    LEUKOCYTESUR negative 09/06/2016    NITRITE " "negative 09/06/2016    PROTEINUA negative 09/06/2016    KETONESU 1+ 09/06/2016    BILIRUBINUR negative 09/06/2016    BLOODU negative 09/06/2016   ,   Urine Culture: No results found for: \"URINECX\",   Wound Culure: No results found for: \"WOUNDCULT\"    Imaging Results Review: No pertinent imaging studies reviewed.  Other Study Results Review: No additional pertinent studies reviewed.    VTE Prophylaxis: VTE covered by:    None      "

## 2025-06-30 NOTE — ASSESSMENT & PLAN NOTE
Lab Results   Component Value Date    HGBA1C 10.3 (A) 05/02/2025     Recent Labs     06/29/25  1218 06/29/25  1641 06/30/25  0003 06/30/25  0531   POCGLU 74 174* 167* 113     Blood Sugar Average: Last 72 hrs:  (P) 126.6    -hypoglycemic protocol  -SSI while inpatient  -holding home jardiance & januvia

## 2025-06-30 NOTE — PLAN OF CARE
Problem: PAIN - ADULT  Goal: Verbalizes/displays adequate comfort level or baseline comfort level  Description: Interventions:  - Encourage patient to monitor pain and request assistance  - Assess pain using appropriate pain scale  - Administer analgesics as ordered based on type and severity of pain and evaluate response  - Implement non-pharmacological measures as appropriate and evaluate response  - Consider cultural and social influences on pain and pain management  - Notify physician/advanced practitioner if interventions unsuccessful or patient reports new pain  - Educate patient/family on pain management process including their role and importance of  reporting pain   - Provide non-pharmacologic/complimentary pain relief interventions  6/30/2025 0456 by Renita Rucker RN  Outcome: Progressing  6/29/2025 2105 by Renita Rucker RN  Outcome: Progressing

## 2025-06-30 NOTE — DISCHARGE SUMMARY
Discharge Summary - Hospitalist   Name: Rick Power 74 y.o. male I MRN: [id number]  Unit/Bed#: [facility] 509-01 I Date of Admission: 6/27/2025   Date of Service: 6/30/2025 I Hospital Day: 3     Assessment & Plan  Limb ischemia  CTA revealed occlusion of R fem/pop artery stent. Pt underwent IR lysis 6/28 tx to SD1 for lysis mgmnt. 6/29 lysis recheck R SFA angio/restenting.   Patient yesterday transferred out of the unit.  He is doing well.  He has been ambulating with his cane to the bathroom.  States his leg is doing well  Type 2 diabetes mellitus with hyperglycemia, without long-term current use of insulin (HCC)  Lab Results   Component Value Date    HGBA1C 10.3 (A) 05/02/2025         Recent Labs     06/29/25  1218 06/29/25  1641 06/30/25  0003 06/30/25  0531   POCGLU 74 174* 167* 113       Blood Sugar Average: Last 72 hrs:  (P) 126.6  Resume his outpatient oral medication  Dyslipidemia  Continue statin  SIRS (systemic inflammatory response syndrome) (HCC)  SIRS, now resolved, in the post procedural setting; as evidenced by meeting two SIRS criteria without an infectious process present; requiring supportive care with ICU monitoring while undergoing TPA lysis, heparin gtt, and pain control with Dilaudid PCA.        Medical Problems       Resolved Problems  Date Reviewed: 5/2/2025   None       Discharging Physician / Practitioner: Sejal Townsend MD  PCP: Darvin Keith PA-C  Admission Date:   Admission Orders (From admission, onward)       Ordered        06/27/25 2015  Inpatient Admission  Once                          Discharge Date: 06/30/25    Next Steps for Physician/AP Assuming Care:  none    Test Results Pending at Discharge (will require follow up):  none    Medication Changes for Discharge & Rationale:   Aspirin 81 mg daily and Plavix 75 mg daily  See after visit summary for reconciled discharge medications provided to patient and/or family.     Consultations During Hospital Stay:    Vascular  surgery  Interventional radiology  Procedures Performed:   6/28Pt underwent IR lysis 6/28 6/29 lysis recheck R SFA angio/restenting.     Significant Findings / Test Results:   CTA abdomen with runoff- 1. Atherosclerotic disease is reflecting the abdominal aorta, branch vessels, and arterial system of both lower extremities as detailed above.  2. Occluded previously placed distal right superficial femoral/proximal popliteal artery stent with reconstitution of the P2 segment of the right popliteal artery. Segmental atherosclerotic disease of the tibioperoneal vessels with one-vessel runoff via   the anterior tibial artery to the ankle and foot via the dorsalis pedis. Slightly delayed filling of the tibioperoneal vessels.  3. Occluded P1/P2 left popliteal artery segments with reconstitution of the P3 segment with segmental atherosclerotic disease of the tibioperoneal vessels with two-vessel runoff via the posterior tibial and peroneal arteries to the ankle and foot.  4. Incidental findings as described above.  CT head- No acute intracranial abnormality. Mild focal encephalomalacia within the left inferior lateral frontal lobe.   Incidental Findings:   none    Hospital Course:   Rick Power is a 74 y.o. male patient who originally presented to the hospital on 6/27/2025 due to limb ischemia evaluated by vascular surgery and interventional radiology he underwent IR lysis on 628 and lyses a recheck with right SFA angio/restenting on 6/29 with interventional radiology.  Post that he was moved to [facility] stepdown post lysis management.  Did well and moved out to [facility].  On day of discharge he was cleared by vascular to be discharged home he is currently on aspirin and Plavix and statin.  Not complaining of any problems with the leg.  Cleared to be discharged          Please see above list of diagnoses and related plan for additional information.     Discharge Day Visit / Exam:   Subjective: Patient seen and  examined denies any complaints  Vitals: Blood Pressure: 97/60 (06/30/25 0718)  Pulse: 99 (06/30/25 0718)  Temperature: 98.6 °F (37 °C) (06/30/25 0718)  Temp Source: Oral (06/30/25 0718)  Respirations: 17 (06/30/25 0718)  SpO2: 99 % (06/30/25 0718)  Physical Exam  Vitals and nursing note reviewed.   Constitutional:       General: He is not in acute distress.     Appearance: He is well-developed.   HENT:      Head: Normocephalic and atraumatic.     Eyes:      Conjunctiva/sclera: Conjunctivae normal.       Cardiovascular:      Rate and Rhythm: Normal rate and regular rhythm.      Heart sounds: No murmur heard.  Pulmonary:      Effort: Pulmonary effort is normal. No respiratory distress.      Breath sounds: Normal breath sounds. No wheezing or rales.   Abdominal:      Palpations: Abdomen is soft.      Tenderness: There is no abdominal tenderness.     Musculoskeletal:         General: No swelling.      Cervical back: Neck supple.     Skin:     General: Skin is warm and dry.      Capillary Refill: Capillary refill takes less than 2 seconds.     Neurological:      Mental Status: He is alert and oriented to person, place, and time.     Psychiatric:         Mood and Affect: Mood normal.          Discussion with Family: pt    Discharge instructions/Information to patient and family:   See after visit summary for information provided to patient and family.      Provisions for Follow-Up Care:  See after visit summary for information related to follow-up care and any pertinent home health orders.      Mobility at time of Discharge:   Basic Mobility Inpatient Raw Score: 20  JH-HLM Goal: 6: Walk 10 steps or more  JH-HLM Achieved: 6: Walk 10 steps or more  HLM Goal achieved. Continue to encourage appropriate mobility.     Disposition:   Home    Planned Readmission: no    Administrative Statements   Discharge Statement:  I have spent a total time of >35 minutes in caring for this patient on the day of the visit/encounter. >30  minutes of time was spent on: Documenting in the medical record and Reviewing / ordering tests, medicine, procedures  .    **Please Note: This note may have been constructed using a voice recognition system**

## 2025-06-30 NOTE — ASSESSMENT & PLAN NOTE
SIRS, now resolved, in the post procedural setting; as evidenced by meeting two SIRS criteria without an infectious process present; requiring supportive care with ICU monitoring while undergoing TPA lysis, heparin gtt, and pain control with Dilaudid PCA.

## 2025-06-30 NOTE — DISCHARGE INSTR - AVS FIRST PAGE
DISCHARGE INSTRUCTIONS  ARTERIOGRAM/ANGIOPLASTY/STENT    ACTIVITY: On the evening following the procedure, you should be mostly resting.  Someone should remain with you during the evening and overnight following the procedure.     On the day after your procedure, limit your activity to walking.  Avoid heavy lifting (no more than 15 lbs) for the first three days. Walking up steps and normal activities may be resumed as you feel ready.   You should not drive a car for at least two days following discharge from the hospital. You may ride in a car.   If you have any questions regarding a particular activity, please discuss with your doctor or nurse before you are discharged.    DIET:  Resume your normal diet.  Drink more water than usual for the next 24 hours.    PROCEDURE SITE: You may have a procedure site in your groin, arm, or foot.  You may have surgical glue at your procedure site.  The glue is used to cover the procedure site, assist in closure, and prevent contamination. This adhesive will darken and peel away on its own within one to two weeks. Do not pick at it.    You should shower daily.  Wash incision daily with soap and water, but do not rub or scrub the incision; rinse thoroughly and pat dry.  Do not bathe in a tub or swim for the first 2 week following your procedure or if you have any open wounds.  It is normal to have some bruising, swelling or discoloration around the procedure site.  IF increasing redness, pain, or a bulge develops, call our office immediately.    If present, you may remove the band-aid or “steri-strips” over your procedure site after two days.   If you notice any active bleeding at the site, apply pressure to the site and call our office ([phone]) or [phone].    FOLLOW UP STUDIES:  Your doctor will discuss whether further treatments or follow-up studies are necessary at your first post procedure visit.    FOLLOW UP APPOINTMENTS:  Making and keeping follow up appointments and  ultrasound tests are important to your recovery.  If you have difficulty making it to or keeping your follow up appointments, call the office.    If you have increased pain, fever >101.5, increased drainage, redness or a bad smell at your surgery site, new coldness/numbness of your arm or leg, please call us immediately and GO directly to the ER.    Appt w/ Dr. Leach: 7/21/2025 at 4pm, [place] office      PLEASE CALL THE OFFICE IF YOU HAVE ANY QUESTIONS  [phone]  [phone] [address], [address], [place] [address]  [address] [place] [address]  [address] [place] [address] [place] [address]  [address] [facility], [place] [address]  [address] [facility], [place] [address]  [address] [facility], [place] [address]  [address] [facility], [facility] [address], [address], [place] [address]  [address] [facility], 1st Floor, [address], [place] [address]  [address] [facility], [facility], [place] [address]  [address] [facility], [address], [place] [address]

## 2025-06-30 NOTE — ASSESSMENT & PLAN NOTE
CTA revealed occlusion of R fem/pop artery stent. Pt underwent IR lysis 6/28 tx to SD1 for lysis mgmnt. 6/29 lysis recheck R SFA angio/restenting.   Patient yesterday transferred out of the unit.  He is doing well.  He has been ambulating with his cane to the bathroom.  States his leg is doing well

## 2025-06-30 NOTE — DISCHARGE INSTRUCTIONS
DISCHARGE INSTRUCTIONS  ARTERIOGRAM/ANGIOPLASTY/STENT    ACTIVITY: On the evening following the procedure, you should be mostly resting.  Someone should remain with you during the evening and overnight following the procedure.     On the day after your procedure, limit your activity to walking.  Avoid heavy lifting (no more than 15 lbs) for the first three days. Walking up steps and normal activities may be resumed as you feel ready.   You should not drive a car for at least two days following discharge from the hospital. You may ride in a car.   If you have any questions regarding a particular activity, please discuss with your doctor or nurse before you are discharged.    DIET:  Resume your normal diet.  Drink more water than usual for the next 24 hours.    PROCEDURE SITE: You may have a procedure site in your groin, arm, or foot.  You may have surgical glue at your procedure site.  The glue is used to cover the procedure site, assist in closure, and prevent contamination. This adhesive will darken and peel away on its own within one to two weeks. Do not pick at it.    You should shower daily.  Wash incision daily with soap and water, but do not rub or scrub the incision; rinse thoroughly and pat dry.  Do not bathe in a tub or swim for the first 2 week following your procedure or if you have any open wounds.  It is normal to have some bruising, swelling or discoloration around the procedure site.  IF increasing redness, pain, or a bulge develops, call our office immediately.    If present, you may remove the band-aid or “steri-strips” over your procedure site after two days.   If you notice any active bleeding at the site, apply pressure to the site and call our office ([phone]) or [phone].    FOLLOW UP STUDIES:  Your doctor will discuss whether further treatments or follow-up studies are necessary at your first post procedure visit.    FOLLOW UP APPOINTMENTS:  Making and keeping follow up appointments and  ultrasound tests are important to your recovery.  If you have difficulty making it to or keeping your follow up appointments, call the office.    If you have increased pain, fever >101.5, increased drainage, redness or a bad smell at your surgery site, new coldness/numbness of your arm or leg, please call us immediately and GO directly to the ER.    Appt w/     PLEASE CALL THE OFFICE IF YOU HAVE ANY QUESTIONS  [phone]  [phone] [address], [address], [place] [address]  [address] [place] [address]  [address] [place] [address]  360 [facility], [place] [address]  [address] [facility], [address], [place] [address]  [address] [facility], [address], [place] [address]  11[address] [facility], Entr[address] A, 2nd Floor, [place] [address]  755 [facility], 1st Floor, [address], [place] [address]  [address] [facility], [facility], [place] [address]  15[address] [facility], [address], [place] [address]

## 2025-06-30 NOTE — ASSESSMENT & PLAN NOTE
Lab Results   Component Value Date    HGBA1C 10.3 (A) 05/02/2025         Recent Labs     06/29/25  1218 06/29/25  1641 06/30/25  0003 06/30/25  0531   POCGLU 74 174* 167* 113       Blood Sugar Average: Last 72 hrs:  (P) 126.6  Resume his outpatient oral medication

## 2025-07-01 ENCOUNTER — PATIENT OUTREACH (OUTPATIENT)
Dept: CASE MANAGEMENT | Facility: OTHER | Age: 75
End: 2025-07-01

## 2025-07-01 VITALS
HEART RATE: 102 BPM | RESPIRATION RATE: 17 BRPM | OXYGEN SATURATION: 99 % | DIASTOLIC BLOOD PRESSURE: 60 MMHG | SYSTOLIC BLOOD PRESSURE: 97 MMHG | TEMPERATURE: 98.2 F

## 2025-07-01 LAB
ATRIAL RATE: 92 BPM
P AXIS: 73 DEGREES
PR INTERVAL: 170 MS
QRS AXIS: -34 DEGREES
QRSD INTERVAL: 96 MS
QT INTERVAL: 370 MS
QTC INTERVAL: 457 MS
T WAVE AXIS: -17 DEGREES
VENTRICULAR RATE: 92 BPM

## 2025-07-01 PROCEDURE — 93010 ELECTROCARDIOGRAM REPORT: CPT | Performed by: INTERNAL MEDICINE

## 2025-07-01 NOTE — UTILIZATION REVIEW
NOTIFICATION OF ADMISSION DISCHARGE   This is a Notification of Discharge from [facility]. Please be advised that this patient has been discharge from our facility. Below you will find the admission and discharge date and time including the patient’s disposition.   UTILIZATION REVIEW CONTACT:  Utilization Review Assistants  Network Utilization Review Department  Phone: [phone] x carefully listen to the prompts. All voicemails are confidential.  Email: [email or web address]     ADMISSION INFORMATION  PRESENTATION DATE: 6/27/2025  7:22 PM  OBERVATION ADMISSION DATE: N/A  INPATIENT ADMISSION DATE: 6/27/25  8:15 PM   DISCHARGE DATE: 6/30/2025 12:47 PM   DISPOSITION:Home/Self Care    Network Utilization Review Department  ATTENTION: Please call with any questions or concerns to [phone] and carefully listen to the prompts so that you are directed to the right person. All voicemails are confidential.   For Discharge needs, contact Care Management DC Support Team at [phone] opt. 2  Send all requests for admission clinical reviews, approved or denied determinations and any other requests to dedicated fax number below belonging to the campus where the patient is receiving treatment. List of dedicated fax numbers for the Facilities:  FACILITY NAME UR FAX NUMBER   ADMISSION DENIALS (Administrative/Medical Necessity) [phone]   DISCHARGE SUPPORT TEAM ([facility]) [phone]   PARENT CHILD HEALTH (Maternity/NICU/Pediatrics) [phone]   [facility] [phone]   [facility] [phone]   [facility] [phone]   [facility] [phone]   [facility] [phone]   [facility] [phone]   [facility] [phone]   [facility] [phone]   [facility]  [facility] [phone]   [facility] [phone]   [facility] [phone]   [facility] [phone]

## 2025-07-02 ENCOUNTER — PATIENT OUTREACH (OUTPATIENT)
Dept: CASE MANAGEMENT | Facility: HOSPITAL | Age: 75
End: 2025-07-02

## 2025-07-02 ENCOUNTER — TELEPHONE (OUTPATIENT)
Dept: FAMILY MEDICINE CLINIC | Facility: CLINIC | Age: 75
End: 2025-07-02

## 2025-07-02 NOTE — PROGRESS NOTES
HRR referral received via report. Pt admitted to [facility] 6/27-6/30/25 for Limb Ischemia.  Chart review performed.    PMH: Amb Dysf, DM2-10.3,     Attempted outreach to pt, unable to leave , message on phone was requesting a mailbox number to continue.    CM will make another attempt at outreach.                  F/U Apts:  PCP 7/10  Vasc Surg 7/21

## 2025-07-02 NOTE — TELEPHONE ENCOUNTER
Patient missed appointment this morning, patient forgot to mention he couldn't drive for 5 days. Patient is rescheduled for 7/10.

## 2025-07-10 ENCOUNTER — OFFICE VISIT (OUTPATIENT)
Dept: FAMILY MEDICINE CLINIC | Facility: CLINIC | Age: 75
End: 2025-07-10
Payer: COMMERCIAL

## 2025-07-10 VITALS
OXYGEN SATURATION: 96 % | TEMPERATURE: 96.6 F | DIASTOLIC BLOOD PRESSURE: 66 MMHG | SYSTOLIC BLOOD PRESSURE: 128 MMHG | BODY MASS INDEX: 20.77 KG/M2 | HEART RATE: 105 BPM | WEIGHT: 146.8 LBS

## 2025-07-10 DIAGNOSIS — I99.8 LIMB ISCHEMIA: ICD-10-CM

## 2025-07-10 PROCEDURE — 99495 TRANSJ CARE MGMT MOD F2F 14D: CPT | Performed by: PHYSICIAN ASSISTANT

## 2025-07-10 RX ORDER — CLOPIDOGREL BISULFATE 75 MG/1
75 TABLET ORAL DAILY
Qty: 90 TABLET | Refills: 1 | Status: SHIPPED | OUTPATIENT
Start: 2025-07-10

## 2025-07-10 NOTE — PROGRESS NOTES
Transition of Care Visit:  Name: Rick Power      : 1950      MRN: [id number]  Encounter Provider: Darvin Keith PA-C  Encounter Date: 7/10/2025   Encounter department: [facility]    Assessment & Plan  Limb ischemia     - Pt presents for TCM s/p to R sided limb ischemia    - Patient presented w/ R leg pain and CTA revealed occlusion of R fem/pop artery stent    - He underwent IR lysis on  for SD1 for lysis management, lysis recheck on  R SFA angio/resenting    - Patient was discharged the day following and states he has been doing well since discharge, denying any leg pain, shortness of breath, NVD    - Extremity on exam WNL    - Patient to follow up w/ vascular surgery on     - Anticoagulation refilled   Orders:    clopidogrel (PLAVIX) 75 mg tablet; Take 1 tablet (75 mg total) by mouth daily         History of Present Illness     Transitional Care Management Review:   Rick Power is a 74 y.o. male here for TCM follow up.     During the TCM phone call patient stated:  TCM Call (since 2025)       Date and time call was made  2025  3:58 PM    Hospital care reviewed  Records reviewed    Patient was hospitialized at  [facility]    Date of Admission  25    Date of discharge  25    Diagnosis  Limb ischemia    Disposition  Home    Were the patients medications reviewed and updated  Yes    Current Symptoms  None          TCM Call (since 2025)       Post hospital issues  Reduced activity    Scheduled for follow up?  Yes    Did you obtain your prescribed medications  Yes    Do you need help managing your prescriptions or medications  No    Is transportation to your appointment needed  No    Living Arrangements  Alone    Support System  Friends    Are you recieving home care services  No          Rick Power is a 74 y.o. male PMH PAD, T2DM presenting today for TCM after discharge from [facility]. Patient endorses no  concerns since discharge and feels well. Meds reviewed and are appropriate. Will be following up w/ vasc surg on 7/19.       Review of Systems   Constitutional:  Negative for chills and fever.   HENT:  Negative for ear pain and sore throat.    Eyes:  Negative for pain and visual disturbance.   Respiratory:  Negative for cough and shortness of breath.    Cardiovascular:  Negative for chest pain and palpitations.   Gastrointestinal:  Negative for abdominal pain and vomiting.   Genitourinary:  Negative for dysuria and hematuria.   Musculoskeletal:  Negative for arthralgias and back pain.   Skin:  Negative for color change and rash.   Neurological:  Negative for seizures and syncope.   All other systems reviewed and are negative.    Objective   /66   Pulse 105   Temp (!) 96.6 °F (35.9 °C)   Wt 66.6 kg (146 lb 12.8 oz)   SpO2 96%   BMI 20.77 kg/m²     Physical Exam  Constitutional:       Appearance: Normal appearance.   HENT:      Head: Normocephalic.      Right Ear: External ear normal.      Left Ear: External ear normal.      Nose: Nose normal.      Mouth/Throat:      Mouth: Mucous membranes are moist.      Pharynx: Oropharynx is clear.     Eyes:      Conjunctiva/sclera: Conjunctivae normal.       Cardiovascular:      Rate and Rhythm: Normal rate and regular rhythm.      Heart sounds: Normal heart sounds.   Pulmonary:      Effort: Pulmonary effort is normal.      Breath sounds: Normal breath sounds.   Abdominal:      General: Bowel sounds are normal.      Palpations: Abdomen is soft.     Neurological:      Mental Status: He is alert and oriented to person, place, and time.     Psychiatric:         Behavior: Behavior normal.       Medications have been reviewed by provider in current encounter

## 2025-07-10 NOTE — ASSESSMENT & PLAN NOTE
- Pt presents for TCM s/p to R sided limb ischemia    - Patient presented w/ R leg pain and CTA revealed occlusion of R fem/pop artery stent    - He underwent IR lysis on 6/28 for SD1 for lysis management, lysis recheck on 6/29 R SFA angio/resenting    - Patient was discharged the day following and states he has been doing well since discharge, denying any leg pain, shortness of breath, NVD    - Extremity on exam WNL    - Patient to follow up w/ vascular surgery on 7/19    - Anticoagulation refilled   Orders:    clopidogrel (PLAVIX) 75 mg tablet; Take 1 tablet (75 mg total) by mouth daily

## 2025-07-15 ENCOUNTER — PATIENT OUTREACH (OUTPATIENT)
Dept: CASE MANAGEMENT | Facility: OTHER | Age: 75
End: 2025-07-15

## 2025-08-01 ENCOUNTER — OFFICE VISIT (OUTPATIENT)
Dept: FAMILY MEDICINE CLINIC | Facility: CLINIC | Age: 75
End: 2025-08-01
Payer: COMMERCIAL

## 2025-08-01 VITALS
OXYGEN SATURATION: 97 % | TEMPERATURE: 96.3 F | SYSTOLIC BLOOD PRESSURE: 98 MMHG | DIASTOLIC BLOOD PRESSURE: 60 MMHG | BODY MASS INDEX: 20.88 KG/M2 | HEART RATE: 88 BPM | WEIGHT: 147.6 LBS

## 2025-08-01 DIAGNOSIS — N18.31 TYPE 2 DIABETES MELLITUS WITH STAGE 3A CHRONIC KIDNEY DISEASE, WITHOUT LONG-TERM CURRENT USE OF INSULIN (HCC): Primary | ICD-10-CM

## 2025-08-01 DIAGNOSIS — E11.22 TYPE 2 DIABETES MELLITUS WITH STAGE 3A CHRONIC KIDNEY DISEASE, WITHOUT LONG-TERM CURRENT USE OF INSULIN (HCC): Primary | ICD-10-CM

## 2025-08-01 LAB — SL AMB POCT HEMOGLOBIN AIC: 8.7 (ref ?–6.5)

## 2025-08-01 PROCEDURE — G2211 COMPLEX E/M VISIT ADD ON: HCPCS | Performed by: PHYSICIAN ASSISTANT

## 2025-08-01 PROCEDURE — 99213 OFFICE O/P EST LOW 20 MIN: CPT | Performed by: PHYSICIAN ASSISTANT

## 2025-08-01 PROCEDURE — 83036 HEMOGLOBIN GLYCOSYLATED A1C: CPT | Performed by: PHYSICIAN ASSISTANT

## 2025-08-05 ENCOUNTER — PATIENT OUTREACH (OUTPATIENT)
Dept: CASE MANAGEMENT | Facility: OTHER | Age: 75
End: 2025-08-05

## (undated) DEVICE — NEEDLE SPINAL 22G X 3.5IN  QUINCKE

## (undated) DEVICE — PLASTIC ADHESIVE BANDAGE: Brand: CURITY

## (undated) DEVICE — SYRINGE 5ML LL

## (undated) DEVICE — TRAY EPIDURAL PERIFIX 20GA X 3.5IN TUOHY 8ML

## (undated) DEVICE — CHLORAPREP HI-LITE 10.5ML ORANGE

## (undated) DEVICE — FLEXIBLE ADHESIVE BANDAGE,X-LARGE: Brand: CURITY

## (undated) DEVICE — IV EXTENSION TUBING 33 IN